# Patient Record
Sex: FEMALE | Race: WHITE | NOT HISPANIC OR LATINO | Employment: OTHER | URBAN - METROPOLITAN AREA
[De-identification: names, ages, dates, MRNs, and addresses within clinical notes are randomized per-mention and may not be internally consistent; named-entity substitution may affect disease eponyms.]

---

## 2017-02-20 ENCOUNTER — HISTORICAL (OUTPATIENT)
Dept: LAB | Facility: HOSPITAL | Age: 67
End: 2017-02-20

## 2017-07-10 ENCOUNTER — HISTORICAL (OUTPATIENT)
Dept: LAB | Facility: HOSPITAL | Age: 67
End: 2017-07-10

## 2017-07-10 LAB
ALBUMIN SERPL-MCNC: 3.6 GM/DL (ref 3.4–5)
ALBUMIN/GLOB SERPL: 1.1 RATIO (ref 1.1–2)
ALP SERPL-CCNC: 62 UNIT/L (ref 46–116)
ALT SERPL-CCNC: 23 UNIT/L (ref 12–78)
AST SERPL-CCNC: 18 UNIT/L (ref 15–37)
BILIRUB SERPL-MCNC: 0.4 MG/DL (ref 0.2–1)
BILIRUBIN DIRECT+TOT PNL SERPL-MCNC: 0.11 MG/DL (ref 0–0.2)
BILIRUBIN DIRECT+TOT PNL SERPL-MCNC: 0.29 MG/DL (ref 0–0.8)
BUN SERPL-MCNC: 25 MG/DL (ref 7–18)
CALCIUM SERPL-MCNC: 9.4 MG/DL (ref 8.5–10.1)
CHLORIDE SERPL-SCNC: 106 MMOL/L (ref 98–107)
CHOLEST SERPL-MCNC: 206 MG/DL (ref 0–200)
CHOLEST/HDLC SERPL: 4.5 {RATIO} (ref 0–4)
CO2 SERPL-SCNC: 30.3 MMOL/L (ref 21–32)
CREAT SERPL-MCNC: 1.11 MG/DL (ref 0.6–1.3)
DIGOXIN SERPL-MCNC: 0.7 NG/ML (ref 0.9–2)
GLOBULIN SER-MCNC: 3.3 GM/DL (ref 2.4–3.5)
GLUCOSE SERPL-MCNC: 135 MG/DL (ref 74–106)
HDLC SERPL-MCNC: 46 MG/DL (ref 40–60)
LDLC SERPL CALC-MCNC: 126 MG/DL (ref 0–129)
POTASSIUM SERPL-SCNC: 4.9 MMOL/L (ref 3.5–5.1)
PROT SERPL-MCNC: 6.9 GM/DL (ref 6.4–8.2)
SODIUM SERPL-SCNC: 144 MMOL/L (ref 136–145)
TRIGL SERPL-MCNC: 170 MG/DL
VLDLC SERPL CALC-MCNC: 34 MG/DL

## 2017-12-14 ENCOUNTER — HISTORICAL (OUTPATIENT)
Dept: LAB | Facility: HOSPITAL | Age: 67
End: 2017-12-14

## 2017-12-14 LAB
ALBUMIN SERPL-MCNC: 3.9 GM/DL (ref 3.4–5)
ALBUMIN/GLOB SERPL: 1.2 RATIO (ref 1.1–2)
ALP SERPL-CCNC: 72 UNIT/L (ref 46–116)
ALT SERPL-CCNC: 30 UNIT/L (ref 12–78)
AST SERPL-CCNC: 22 UNIT/L (ref 15–37)
BILIRUB SERPL-MCNC: 0.6 MG/DL (ref 0.2–1)
BILIRUBIN DIRECT+TOT PNL SERPL-MCNC: 0.12 MG/DL (ref 0–0.2)
BILIRUBIN DIRECT+TOT PNL SERPL-MCNC: 0.44 MG/DL (ref 0–0.8)
BUN SERPL-MCNC: 28.7 MG/DL (ref 7–18)
CALCIUM SERPL-MCNC: 10.4 MG/DL (ref 8.5–10.1)
CHLORIDE SERPL-SCNC: 102 MMOL/L (ref 98–107)
CHOLEST SERPL-MCNC: 276 MG/DL (ref 0–200)
CHOLEST/HDLC SERPL: 5.8 {RATIO} (ref 0–4)
CO2 SERPL-SCNC: 29 MMOL/L (ref 21–32)
CREAT SERPL-MCNC: 1.33 MG/DL (ref 0.6–1.3)
DIGOXIN SERPL-MCNC: 0.6 NG/ML (ref 0.9–2)
GLOBULIN SER-MCNC: 3.3 GM/DL (ref 2.4–3.5)
GLUCOSE SERPL-MCNC: 255 MG/DL (ref 74–106)
HDLC SERPL-MCNC: 48 MG/DL (ref 40–60)
LDLC SERPL CALC-MCNC: 152 MG/DL (ref 0–129)
POTASSIUM SERPL-SCNC: 3.8 MMOL/L (ref 3.5–5.1)
PROT SERPL-MCNC: 7.2 GM/DL (ref 6.4–8.2)
SODIUM SERPL-SCNC: 143 MMOL/L (ref 136–145)
TRIGL SERPL-MCNC: 382 MG/DL
VLDLC SERPL CALC-MCNC: 76 MG/DL

## 2018-01-10 ENCOUNTER — HISTORICAL (OUTPATIENT)
Dept: LAB | Facility: HOSPITAL | Age: 68
End: 2018-01-10

## 2018-01-10 ENCOUNTER — HISTORICAL (OUTPATIENT)
Dept: PREADMISSION TESTING | Facility: HOSPITAL | Age: 68
End: 2018-01-10

## 2018-01-10 LAB
ABS NEUT (OLG): 3.29 X10(3)/MCL (ref 2.1–9.2)
ALBUMIN SERPL-MCNC: 3.5 GM/DL (ref 3.4–5)
ALBUMIN/GLOB SERPL: 1.1 RATIO (ref 1.1–2)
ALP SERPL-CCNC: 67 UNIT/L (ref 38–126)
ALT SERPL-CCNC: 27 UNIT/L (ref 12–78)
APTT PPP: 32 SECOND(S) (ref 24.8–36.9)
AST SERPL-CCNC: 15 UNIT/L (ref 15–37)
BASOPHILS # BLD AUTO: 0 X10(3)/MCL (ref 0–0.2)
BASOPHILS NFR BLD AUTO: 1 %
BILIRUB SERPL-MCNC: 0.4 MG/DL (ref 0.2–1)
BILIRUBIN DIRECT+TOT PNL SERPL-MCNC: 0.1 MG/DL (ref 0–0.5)
BILIRUBIN DIRECT+TOT PNL SERPL-MCNC: 0.3 MG/DL (ref 0–0.8)
BUN SERPL-MCNC: 26 MG/DL (ref 7–18)
CALCIUM SERPL-MCNC: 10 MG/DL (ref 8.5–10.1)
CHLORIDE SERPL-SCNC: 104 MMOL/L (ref 98–107)
CO2 SERPL-SCNC: 30 MMOL/L (ref 21–32)
CREAT SERPL-MCNC: 1.38 MG/DL (ref 0.55–1.02)
EOSINOPHIL # BLD AUTO: 0.1 X10(3)/MCL (ref 0–0.9)
EOSINOPHIL NFR BLD AUTO: 2 %
ERYTHROCYTE [DISTWIDTH] IN BLOOD BY AUTOMATED COUNT: 12.7 % (ref 11.5–17)
GLOBULIN SER-MCNC: 3.2 GM/DL (ref 2.4–3.5)
GLUCOSE SERPL-MCNC: 215 MG/DL (ref 74–106)
HCT VFR BLD AUTO: 39.4 % (ref 37–47)
HGB BLD-MCNC: 13.2 GM/DL (ref 12–16)
INR PPP: 1.31 (ref 0–1.27)
LYMPHOCYTES # BLD AUTO: 1.9 X10(3)/MCL (ref 0.6–4.6)
LYMPHOCYTES NFR BLD AUTO: 32 %
MCH RBC QN AUTO: 29.4 PG (ref 27–31)
MCHC RBC AUTO-ENTMCNC: 33.5 GM/DL (ref 33–36)
MCV RBC AUTO: 87.8 FL (ref 80–94)
MONOCYTES # BLD AUTO: 0.5 X10(3)/MCL (ref 0.1–1.3)
MONOCYTES NFR BLD AUTO: 9 %
NEUTROPHILS # BLD AUTO: 3.29 X10(3)/MCL (ref 2.1–9.2)
NEUTROPHILS NFR BLD AUTO: 56 %
PLATELET # BLD AUTO: 241 X10(3)/MCL (ref 130–400)
PMV BLD AUTO: 11.6 FL (ref 9.4–12.4)
POTASSIUM SERPL-SCNC: 5.3 MMOL/L (ref 3.5–5.1)
PROT SERPL-MCNC: 6.7 GM/DL (ref 6.4–8.2)
PROTHROMBIN TIME: 16.7 SECOND(S) (ref 12.2–14.7)
RBC # BLD AUTO: 4.49 X10(6)/MCL (ref 4.2–5.4)
SODIUM SERPL-SCNC: 137 MMOL/L (ref 136–145)
WBC # SPEC AUTO: 5.9 X10(3)/MCL (ref 4.5–11.5)

## 2018-01-23 ENCOUNTER — HISTORICAL (OUTPATIENT)
Dept: LAB | Facility: HOSPITAL | Age: 68
End: 2018-01-23

## 2018-01-23 LAB
T3RU NFR SERPL: 34 % (ref 31–39)
T4 SERPL-MCNC: 6.7 MCG/DL (ref 4.7–13.3)
TSH SERPL-ACNC: 7.09 MIU/ML (ref 0.36–3.74)

## 2018-11-08 ENCOUNTER — HISTORICAL (OUTPATIENT)
Dept: RADIOLOGY | Facility: HOSPITAL | Age: 68
End: 2018-11-08

## 2018-12-17 ENCOUNTER — HISTORICAL (OUTPATIENT)
Dept: LAB | Facility: HOSPITAL | Age: 68
End: 2018-12-17

## 2018-12-17 LAB
ALBUMIN SERPL-MCNC: 3.7 GM/DL (ref 3.4–5)
ALBUMIN/GLOB SERPL: 1.1 RATIO (ref 1.1–2)
ALP SERPL-CCNC: 64 UNIT/L (ref 46–116)
ALT SERPL-CCNC: 23 UNIT/L (ref 12–78)
AST SERPL-CCNC: 15 UNIT/L (ref 15–37)
BILIRUB SERPL-MCNC: 0.5 MG/DL (ref 0.2–1)
BILIRUBIN DIRECT+TOT PNL SERPL-MCNC: 0.12 MG/DL (ref 0–0.2)
BILIRUBIN DIRECT+TOT PNL SERPL-MCNC: 0.38 MG/DL (ref 0–0.8)
BUN SERPL-MCNC: 20.1 MG/DL (ref 7–18)
CALCIUM SERPL-MCNC: 10 MG/DL (ref 8.5–10.1)
CHLORIDE SERPL-SCNC: 108 MMOL/L (ref 98–107)
CHOLEST SERPL-MCNC: 210 MG/DL (ref 0–200)
CHOLEST/HDLC SERPL: 5.1 {RATIO} (ref 0–4)
CO2 SERPL-SCNC: 30.9 MMOL/L (ref 21–32)
CREAT SERPL-MCNC: 0.91 MG/DL (ref 0.6–1.3)
GLOBULIN SER-MCNC: 3.3 GM/DL (ref 2.4–3.5)
GLUCOSE SERPL-MCNC: 178 MG/DL (ref 74–106)
HDLC SERPL-MCNC: 41 MG/DL (ref 40–60)
LDLC SERPL CALC-MCNC: 133 MG/DL (ref 0–129)
POTASSIUM SERPL-SCNC: 4.8 MMOL/L (ref 3.5–5.1)
PROT SERPL-MCNC: 7 GM/DL (ref 6.4–8.2)
SODIUM SERPL-SCNC: 146 MMOL/L (ref 136–145)
TRIGL SERPL-MCNC: 180 MG/DL
VLDLC SERPL CALC-MCNC: 36 MG/DL

## 2019-02-20 ENCOUNTER — HISTORICAL (OUTPATIENT)
Dept: LAB | Facility: HOSPITAL | Age: 69
End: 2019-02-20

## 2019-02-20 LAB
ALBUMIN SERPL-MCNC: 3.7 GM/DL (ref 3.4–5)
ALBUMIN/GLOB SERPL: 1.2 RATIO (ref 1.1–2)
ALP SERPL-CCNC: 62 UNIT/L (ref 46–116)
ALT SERPL-CCNC: 23 UNIT/L (ref 12–78)
AST SERPL-CCNC: 15 UNIT/L (ref 15–37)
BILIRUB SERPL-MCNC: 0.5 MG/DL (ref 0.2–1)
BILIRUBIN DIRECT+TOT PNL SERPL-MCNC: 0.13 MG/DL (ref 0–0.2)
BILIRUBIN DIRECT+TOT PNL SERPL-MCNC: 0.37 MG/DL (ref 0–0.8)
BUN SERPL-MCNC: 23.1 MG/DL (ref 7–18)
CALCIUM SERPL-MCNC: 10.4 MG/DL (ref 8.5–10.1)
CHLORIDE SERPL-SCNC: 103 MMOL/L (ref 98–107)
CHOLEST SERPL-MCNC: 237 MG/DL (ref 0–200)
CHOLEST/HDLC SERPL: 5.3 {RATIO} (ref 0–4)
CO2 SERPL-SCNC: 29 MMOL/L (ref 21–32)
CREAT SERPL-MCNC: 0.94 MG/DL (ref 0.6–1.3)
DIGOXIN SERPL-MCNC: 0.6 NG/ML (ref 0.9–2)
GLOBULIN SER-MCNC: 3 GM/DL (ref 2.4–3.5)
GLUCOSE SERPL-MCNC: 154 MG/DL (ref 74–106)
HDLC SERPL-MCNC: 45 MG/DL (ref 40–60)
LDLC SERPL CALC-MCNC: 152 MG/DL (ref 0–129)
POTASSIUM SERPL-SCNC: 4.1 MMOL/L (ref 3.5–5.1)
PROT SERPL-MCNC: 6.7 GM/DL (ref 6.4–8.2)
SODIUM SERPL-SCNC: 142 MMOL/L (ref 136–145)
TRIGL SERPL-MCNC: 199 MG/DL
VLDLC SERPL CALC-MCNC: 40 MG/DL

## 2019-12-02 ENCOUNTER — HISTORICAL (OUTPATIENT)
Dept: LAB | Facility: HOSPITAL | Age: 69
End: 2019-12-02

## 2019-12-02 LAB
ALBUMIN SERPL-MCNC: 4.5 GM/DL (ref 3.4–5)
ALBUMIN/GLOB SERPL: 1.6 RATIO (ref 1.1–2)
ALP SERPL-CCNC: 63 UNIT/L (ref 46–116)
ALT SERPL-CCNC: 23 UNIT/L (ref 12–78)
AST SERPL-CCNC: 20 UNIT/L (ref 15–37)
BILIRUB SERPL-MCNC: 0.8 MG/DL (ref 0.2–1)
BILIRUBIN DIRECT+TOT PNL SERPL-MCNC: 0.22 MG/DL (ref 0–0.2)
BILIRUBIN DIRECT+TOT PNL SERPL-MCNC: 0.58 MG/DL (ref 0–0.8)
BUN SERPL-MCNC: 23.7 MG/DL (ref 7–18)
CALCIUM SERPL-MCNC: 10.1 MG/DL (ref 8.5–10.1)
CHLORIDE SERPL-SCNC: 105 MMOL/L (ref 98–107)
CHOLEST SERPL-MCNC: 124 MG/DL (ref 0–200)
CHOLEST/HDLC SERPL: 2.3 {RATIO} (ref 0–4)
CO2 SERPL-SCNC: 29 MMOL/L (ref 21–32)
CREAT SERPL-MCNC: 1.27 MG/DL (ref 0.6–1.3)
GLOBULIN SER-MCNC: 2.9 GM/DL (ref 2.4–3.5)
GLUCOSE SERPL-MCNC: 164 MG/DL (ref 74–106)
HDLC SERPL-MCNC: 53 MG/DL (ref 40–60)
LDLC SERPL CALC-MCNC: 32 MG/DL (ref 0–129)
POTASSIUM SERPL-SCNC: 4 MMOL/L (ref 3.5–5.1)
PROT SERPL-MCNC: 7.4 GM/DL (ref 6.4–8.2)
SODIUM SERPL-SCNC: 146 MMOL/L (ref 136–145)
TRIGL SERPL-MCNC: 197 MG/DL
VLDLC SERPL CALC-MCNC: 39 MG/DL

## 2020-07-01 ENCOUNTER — HISTORICAL (OUTPATIENT)
Dept: LAB | Facility: HOSPITAL | Age: 70
End: 2020-07-01

## 2020-07-01 LAB
ALBUMIN SERPL-MCNC: 3.8 GM/DL (ref 3.4–5)
ALBUMIN/GLOB SERPL: 1.2 RATIO (ref 1.1–2)
ALP SERPL-CCNC: 40 UNIT/L (ref 46–116)
ALT SERPL-CCNC: 23 UNIT/L (ref 12–78)
AST SERPL-CCNC: 18 UNIT/L (ref 15–37)
BILIRUB SERPL-MCNC: 0.5 MG/DL (ref 0.2–1)
BILIRUBIN DIRECT+TOT PNL SERPL-MCNC: 0.22 MG/DL (ref 0–0.2)
BILIRUBIN DIRECT+TOT PNL SERPL-MCNC: 0.28 MG/DL (ref 0–0.8)
BUN SERPL-MCNC: 91.8 MG/DL (ref 7–18)
CALCIUM SERPL-MCNC: 10 MG/DL (ref 8.5–10.1)
CHLORIDE SERPL-SCNC: 104 MMOL/L (ref 98–107)
CHOLEST SERPL-MCNC: 134 MG/DL (ref 0–200)
CHOLEST/HDLC SERPL: 2 {RATIO} (ref 0–4)
CO2 SERPL-SCNC: 29.1 MMOL/L (ref 21–32)
CREAT SERPL-MCNC: 3.92 MG/DL (ref 0.6–1.3)
DIGOXIN SERPL-MCNC: <0.2 NG/ML (ref 0.9–2)
ERYTHROCYTE [DISTWIDTH] IN BLOOD BY AUTOMATED COUNT: 14.3 % (ref 11.5–17)
GLOBULIN SER-MCNC: 3.2 GM/DL (ref 2.4–3.5)
GLUCOSE SERPL-MCNC: 119 MG/DL (ref 74–106)
HCT VFR BLD AUTO: 32.6 % (ref 37–47)
HDLC SERPL-MCNC: 67 MG/DL (ref 40–60)
HGB BLD-MCNC: 10.8 GM/DL (ref 12–16)
LDLC SERPL CALC-MCNC: 56 MG/DL (ref 0–129)
MCH RBC QN AUTO: 29.3 PG (ref 27–31)
MCHC RBC AUTO-ENTMCNC: 33.1 GM/DL (ref 33–36)
MCV RBC AUTO: 88.6 FL (ref 80–94)
PLATELET # BLD AUTO: 220 X10(3)/MCL (ref 130–400)
PMV BLD AUTO: 10.6 FL (ref 9.4–12.4)
POTASSIUM SERPL-SCNC: 4.7 MMOL/L (ref 3.5–5.1)
PROT SERPL-MCNC: 7 GM/DL (ref 6.4–8.2)
RBC # BLD AUTO: 3.68 X10(6)/MCL (ref 4.2–5.4)
SODIUM SERPL-SCNC: 143 MMOL/L (ref 136–145)
TRIGL SERPL-MCNC: 55 MG/DL
VLDLC SERPL CALC-MCNC: 11 MG/DL
WBC # SPEC AUTO: 4.2 X10(3)/MCL (ref 4.5–11.5)

## 2020-07-22 ENCOUNTER — HISTORICAL (OUTPATIENT)
Dept: LAB | Facility: HOSPITAL | Age: 70
End: 2020-07-22

## 2020-07-22 LAB
BUN SERPL-MCNC: 65.3 MG/DL (ref 7–18)
CALCIUM SERPL-MCNC: 10.6 MG/DL (ref 8.5–10.1)
CHLORIDE SERPL-SCNC: 106 MMOL/L (ref 98–107)
CO2 SERPL-SCNC: 26.8 MMOL/L (ref 21–32)
CREAT SERPL-MCNC: 2.8 MG/DL (ref 0.6–1.3)
CREAT/UREA NIT SERPL: 23 MG/DL (ref 12–14)
GLUCOSE SERPL-MCNC: 139 MG/DL (ref 74–106)
POTASSIUM SERPL-SCNC: 4.7 MMOL/L (ref 3.5–5.1)
SODIUM SERPL-SCNC: 143 MMOL/L (ref 136–145)

## 2020-07-28 ENCOUNTER — HISTORICAL (OUTPATIENT)
Dept: LAB | Facility: HOSPITAL | Age: 70
End: 2020-07-28

## 2020-07-28 LAB
BUN SERPL-MCNC: 45.1 MG/DL (ref 7–18)
CALCIUM SERPL-MCNC: 10.2 MG/DL (ref 8.5–10.1)
CHLORIDE SERPL-SCNC: 105 MMOL/L (ref 98–107)
CO2 SERPL-SCNC: 25.6 MMOL/L (ref 21–32)
CREAT SERPL-MCNC: 1.95 MG/DL (ref 0.6–1.3)
CREAT/UREA NIT SERPL: 23 MG/DL (ref 12–14)
GLUCOSE SERPL-MCNC: 112 MG/DL (ref 74–106)
POTASSIUM SERPL-SCNC: 4.2 MMOL/L (ref 3.5–5.1)
SODIUM SERPL-SCNC: 144 MMOL/L (ref 136–145)

## 2020-08-04 ENCOUNTER — HISTORICAL (OUTPATIENT)
Dept: LAB | Facility: HOSPITAL | Age: 70
End: 2020-08-04

## 2020-08-04 LAB
ALBUMIN SERPL-MCNC: 3.9 GM/DL (ref 3.4–5)
ALBUMIN/GLOB SERPL: 1.2 RATIO (ref 1.1–2)
ALP SERPL-CCNC: 48 UNIT/L (ref 46–116)
ALT SERPL-CCNC: 30 UNIT/L (ref 12–78)
AST SERPL-CCNC: 25 UNIT/L (ref 15–37)
BILIRUB SERPL-MCNC: 0.4 MG/DL (ref 0.2–1)
BILIRUBIN DIRECT+TOT PNL SERPL-MCNC: 0.18 MG/DL (ref 0–0.2)
BILIRUBIN DIRECT+TOT PNL SERPL-MCNC: 0.22 MG/DL (ref 0–0.8)
BUN SERPL-MCNC: 32.6 MG/DL (ref 7–18)
CALCIUM SERPL-MCNC: 10.6 MG/DL (ref 8.5–10.1)
CHLORIDE SERPL-SCNC: 108 MMOL/L (ref 98–107)
CO2 SERPL-SCNC: 28.8 MMOL/L (ref 21–32)
CREAT SERPL-MCNC: 1.92 MG/DL (ref 0.6–1.3)
CREAT UR-MCNC: 39.3 MG/DL (ref 47–110)
DEPRECATED CALCIDIOL+CALCIFEROL SERPL-MC: 27.2 NG/ML (ref 6.6–49.9)
ERYTHROCYTE [DISTWIDTH] IN BLOOD BY AUTOMATED COUNT: 12.9 % (ref 11.5–17)
FERRITIN SERPL-MCNC: 86 NG/ML (ref 8–388)
GLOBULIN SER-MCNC: 3.2 GM/DL (ref 2.4–3.5)
GLUCOSE SERPL-MCNC: 89 MG/DL (ref 74–106)
HCT VFR BLD AUTO: 33.4 % (ref 37–47)
HGB BLD-MCNC: 11.2 GM/DL (ref 12–16)
IRON SATN MFR SERPL: 18.4 % (ref 20–50)
IRON SERPL-MCNC: 61 MCG/DL (ref 50–175)
MAGNESIUM SERPL-MCNC: 2.5 MG/DL (ref 1.8–2.4)
MCH RBC QN AUTO: 30.4 PG (ref 27–31)
MCHC RBC AUTO-ENTMCNC: 33.5 GM/DL (ref 33–36)
MCV RBC AUTO: 90.8 FL (ref 80–94)
PHOSPHATE SERPL-MCNC: 3.8 MG/DL (ref 2.5–4.9)
PLATELET # BLD AUTO: 210 X10(3)/MCL (ref 130–400)
PMV BLD AUTO: 10.3 FL (ref 9.4–12.4)
POTASSIUM SERPL-SCNC: 4.5 MMOL/L (ref 3.5–5.1)
PROT 24H UR-MCNC: <160 MG/24HR (ref 0–165)
PROT SERPL-MCNC: 7.1 GM/DL (ref 6.4–8.2)
PTH-INTACT SERPL-MCNC: 32.8 PG/ML (ref 8.7–77.1)
RBC # BLD AUTO: 3.68 X10(6)/MCL (ref 4.2–5.4)
SODIUM SERPL-SCNC: 144 MMOL/L (ref 136–145)
TIBC SERPL-MCNC: 331 MCG/DL (ref 250–450)
TRANSFERRIN SERPL-MCNC: 280 MG/DL (ref 200–360)
WBC # SPEC AUTO: 4.2 X10(3)/MCL (ref 4.5–11.5)

## 2020-11-06 ENCOUNTER — HISTORICAL (OUTPATIENT)
Dept: LAB | Facility: HOSPITAL | Age: 70
End: 2020-11-06

## 2020-11-06 LAB
ALBUMIN SERPL-MCNC: 3.7 GM/DL (ref 3.4–5)
ALBUMIN/GLOB SERPL: 1.3 RATIO (ref 1.1–2)
ALP SERPL-CCNC: 46 UNIT/L (ref 46–116)
ALT SERPL-CCNC: 22 UNIT/L (ref 12–78)
AST SERPL-CCNC: 22 UNIT/L (ref 15–37)
BILIRUB SERPL-MCNC: 0.4 MG/DL (ref 0.2–1)
BILIRUBIN DIRECT+TOT PNL SERPL-MCNC: 0.17 MG/DL (ref 0–0.2)
BILIRUBIN DIRECT+TOT PNL SERPL-MCNC: 0.23 MG/DL (ref 0–0.8)
BUN SERPL-MCNC: 49.2 MG/DL (ref 7–18)
CALCIUM SERPL-MCNC: 9.4 MG/DL (ref 8.5–10.1)
CHLORIDE SERPL-SCNC: 108 MMOL/L (ref 98–107)
CO2 SERPL-SCNC: 31.9 MMOL/L (ref 21–32)
CREAT SERPL-MCNC: 2.19 MG/DL (ref 0.6–1.3)
ERYTHROCYTE [DISTWIDTH] IN BLOOD BY AUTOMATED COUNT: 12.3 % (ref 11.5–17)
GLOBULIN SER-MCNC: 2.9 GM/DL (ref 2.4–3.5)
GLUCOSE SERPL-MCNC: 109 MG/DL (ref 74–106)
HCT VFR BLD AUTO: 36 % (ref 37–47)
HGB BLD-MCNC: 11.5 GM/DL (ref 12–16)
MAGNESIUM SERPL-MCNC: 2 MG/DL (ref 1.8–2.4)
MCH RBC QN AUTO: 30 PG (ref 27–31)
MCHC RBC AUTO-ENTMCNC: 31.9 GM/DL (ref 33–36)
MCV RBC AUTO: 94 FL (ref 80–94)
PHOSPHATE SERPL-MCNC: 3.6 MG/DL (ref 2.5–4.9)
PLATELET # BLD AUTO: 183 X10(3)/MCL (ref 130–400)
PMV BLD AUTO: 11.8 FL (ref 9.4–12.4)
POTASSIUM SERPL-SCNC: 4.4 MMOL/L (ref 3.5–5.1)
PROT SERPL-MCNC: 6.6 GM/DL (ref 6.4–8.2)
RBC # BLD AUTO: 3.83 X10(6)/MCL (ref 4.2–5.4)
SODIUM SERPL-SCNC: 147 MMOL/L (ref 136–145)
WBC # SPEC AUTO: 4 X10(3)/MCL (ref 4.5–11.5)

## 2020-11-17 ENCOUNTER — HISTORICAL (OUTPATIENT)
Dept: LAB | Facility: HOSPITAL | Age: 70
End: 2020-11-17

## 2020-11-17 LAB
ABS NEUT (OLG): 2.39 X10(3)/MCL (ref 2.1–9.2)
ALBUMIN SERPL-MCNC: 3.7 GM/DL (ref 3.4–4.8)
ALBUMIN/GLOB SERPL: 1.6 RATIO (ref 1.1–2)
ALP SERPL-CCNC: 49 UNIT/L (ref 40–150)
ALT SERPL-CCNC: 23 UNIT/L (ref 0–55)
AST SERPL-CCNC: 21 UNIT/L (ref 5–34)
BASOPHILS # BLD AUTO: 0 X10(3)/MCL (ref 0–0.2)
BASOPHILS NFR BLD AUTO: 1 %
BILIRUB SERPL-MCNC: 0.6 MG/DL
BILIRUBIN DIRECT+TOT PNL SERPL-MCNC: 0.3 MG/DL (ref 0–0.5)
BILIRUBIN DIRECT+TOT PNL SERPL-MCNC: 0.3 MG/DL (ref 0–0.8)
BUN SERPL-MCNC: 43.4 MG/DL (ref 9.8–20.1)
CALCIUM SERPL-MCNC: 9.2 MG/DL (ref 8.4–10.2)
CHLORIDE SERPL-SCNC: 104 MMOL/L (ref 98–107)
CO2 SERPL-SCNC: 28 MMOL/L (ref 23–31)
CREAT 24H UR-MCNC: 1575 MG/DAY (ref 710–1650)
CREAT SERPL-MCNC: 1.85 MG/DL (ref 0.55–1.02)
CREAT UR-MCNC: 50 MG/DL (ref 47–110)
DEPRECATED CALCIDIOL+CALCIFEROL SERPL-MC: 24.2 NG/ML (ref 30–80)
EOSINOPHIL # BLD AUTO: 0.1 X10(3)/MCL (ref 0–0.9)
EOSINOPHIL NFR BLD AUTO: 3 %
ERYTHROCYTE [DISTWIDTH] IN BLOOD BY AUTOMATED COUNT: 13.5 % (ref 11.5–17)
FERRITIN SERPL-MCNC: 66.5 NG/ML (ref 4.63–204)
GLOBULIN SER-MCNC: 2.3 GM/DL (ref 2.4–3.5)
GLUCOSE SERPL-MCNC: 246 MG/DL (ref 82–115)
HCT VFR BLD AUTO: 34.1 % (ref 37–47)
HGB BLD-MCNC: 10.5 GM/DL (ref 12–16)
IRON SATN MFR SERPL: 21 % (ref 20–50)
IRON SERPL-MCNC: 70 UG/DL (ref 50–170)
LYMPHOCYTES # BLD AUTO: 0.7 X10(3)/MCL (ref 0.6–4.6)
LYMPHOCYTES NFR BLD AUTO: 20 %
MAGNESIUM SERPL-MCNC: 2.15 MG/DL (ref 1.6–2.6)
MCH RBC QN AUTO: 29.3 PG (ref 27–31)
MCHC RBC AUTO-ENTMCNC: 30.8 GM/DL (ref 33–36)
MCV RBC AUTO: 95.3 FL (ref 80–94)
MONOCYTES # BLD AUTO: 0.3 X10(3)/MCL (ref 0.1–1.3)
MONOCYTES NFR BLD AUTO: 8 %
NEUTROPHILS # BLD AUTO: 2.39 X10(3)/MCL (ref 1.4–7.9)
NEUTROPHILS NFR BLD AUTO: 68 %
PHOSPHATE SERPL-MCNC: 3.4 MG/DL (ref 2.3–4.7)
PLATELET # BLD AUTO: 170 X10(3)/MCL (ref 130–400)
PMV BLD AUTO: 11.2 FL (ref 9.4–12.4)
POTASSIUM SERPL-SCNC: 4.4 MMOL/L (ref 3.5–5.1)
PROT 24H UR-MCNC: <214 MG/24HR (ref 0–165)
PROT SERPL-MCNC: 6 GM/DL (ref 5.8–7.6)
PTH-INTACT SERPL-MCNC: 133.1 PG/ML (ref 8.7–77.1)
RBC # BLD AUTO: 3.58 X10(6)/MCL (ref 4.2–5.4)
SODIUM SERPL-SCNC: 140 MMOL/L (ref 136–145)
TIBC SERPL-MCNC: 262 UG/DL (ref 70–310)
TIBC SERPL-MCNC: 332 UG/DL (ref 250–450)
TRANSFERRIN SERPL-MCNC: 288 MG/DL (ref 173–360)
WBC # SPEC AUTO: 3.5 X10(3)/MCL (ref 4.5–11.5)

## 2020-11-30 ENCOUNTER — HISTORICAL (OUTPATIENT)
Dept: LAB | Facility: HOSPITAL | Age: 70
End: 2020-11-30

## 2020-11-30 LAB
BUN SERPL-MCNC: 80.6 MG/DL (ref 9.8–20.1)
CALCIUM SERPL-MCNC: 10.1 MG/DL (ref 8.4–10.2)
CHLORIDE SERPL-SCNC: 98 MMOL/L (ref 98–107)
CO2 SERPL-SCNC: 30 MMOL/L (ref 23–31)
CREAT SERPL-MCNC: 2.75 MG/DL (ref 0.55–1.02)
CREAT/UREA NIT SERPL: 29
GLUCOSE SERPL-MCNC: 167 MG/DL (ref 82–115)
POTASSIUM SERPL-SCNC: 4.2 MMOL/L (ref 3.5–5.1)
SODIUM SERPL-SCNC: 142 MMOL/L (ref 136–145)

## 2020-12-22 ENCOUNTER — HISTORICAL (OUTPATIENT)
Dept: LAB | Facility: HOSPITAL | Age: 70
End: 2020-12-22

## 2020-12-22 LAB
ABS NEUT (OLG): 2.52 X10(3)/MCL (ref 2.1–9.2)
ALBUMIN SERPL-MCNC: 3.8 GM/DL (ref 3.4–4.8)
ALBUMIN/GLOB SERPL: 1.4 RATIO (ref 1.1–2)
ALP SERPL-CCNC: 39 UNIT/L (ref 40–150)
ALT SERPL-CCNC: 18 UNIT/L (ref 0–55)
AST SERPL-CCNC: 26 UNIT/L (ref 5–34)
BASOPHILS # BLD AUTO: 0 X10(3)/MCL (ref 0–0.2)
BASOPHILS NFR BLD AUTO: 1 %
BILIRUB SERPL-MCNC: 0.6 MG/DL
BILIRUBIN DIRECT+TOT PNL SERPL-MCNC: 0.3 MG/DL (ref 0–0.5)
BILIRUBIN DIRECT+TOT PNL SERPL-MCNC: 0.3 MG/DL (ref 0–0.8)
BUN SERPL-MCNC: 46.2 MG/DL (ref 9.8–20.1)
CALCIUM SERPL-MCNC: 10 MG/DL (ref 8.4–10.2)
CHLORIDE SERPL-SCNC: 100 MMOL/L (ref 98–107)
CO2 SERPL-SCNC: 37 MMOL/L (ref 23–31)
CREAT SERPL-MCNC: 2.14 MG/DL (ref 0.55–1.02)
DEPRECATED CALCIDIOL+CALCIFEROL SERPL-MC: 29.8 NG/ML (ref 30–80)
EOSINOPHIL # BLD AUTO: 0.1 X10(3)/MCL (ref 0–0.9)
EOSINOPHIL NFR BLD AUTO: 3 %
ERYTHROCYTE [DISTWIDTH] IN BLOOD BY AUTOMATED COUNT: 12.7 % (ref 11.5–17)
FERRITIN SERPL-MCNC: 93.3 NG/ML (ref 4.63–204)
GLOBULIN SER-MCNC: 2.7 GM/DL (ref 2.4–3.5)
GLUCOSE SERPL-MCNC: 72 MG/DL (ref 82–115)
HCT VFR BLD AUTO: 34.3 % (ref 37–47)
HGB BLD-MCNC: 11 GM/DL (ref 12–16)
IRON SATN MFR SERPL: 20 % (ref 20–50)
IRON SERPL-MCNC: 72 UG/DL (ref 50–170)
LYMPHOCYTES # BLD AUTO: 1 X10(3)/MCL (ref 0.6–4.6)
LYMPHOCYTES NFR BLD AUTO: 26 %
MAGNESIUM SERPL-MCNC: 2.03 MG/DL (ref 1.6–2.6)
MCH RBC QN AUTO: 29.7 PG (ref 27–31)
MCHC RBC AUTO-ENTMCNC: 32.1 GM/DL (ref 33–36)
MCV RBC AUTO: 92.7 FL (ref 80–94)
MONOCYTES # BLD AUTO: 0.4 X10(3)/MCL (ref 0.1–1.3)
MONOCYTES NFR BLD AUTO: 8 %
NEUTROPHILS # BLD AUTO: 2.52 X10(3)/MCL (ref 1.4–7.9)
NEUTROPHILS NFR BLD AUTO: 62 %
PHOSPHATE SERPL-MCNC: 3.6 MG/DL (ref 2.3–4.7)
PLATELET # BLD AUTO: 184 X10(3)/MCL (ref 130–400)
PMV BLD AUTO: 10.9 FL (ref 9.4–12.4)
POTASSIUM SERPL-SCNC: 3.8 MMOL/L (ref 3.5–5.1)
PROT SERPL-MCNC: 6.5 GM/DL (ref 5.8–7.6)
PTH-INTACT SERPL-MCNC: 48.9 PG/ML (ref 8.7–77.1)
RBC # BLD AUTO: 3.7 X10(6)/MCL (ref 4.2–5.4)
SODIUM SERPL-SCNC: 143 MMOL/L (ref 136–145)
TIBC SERPL-MCNC: 297 UG/DL (ref 70–310)
TIBC SERPL-MCNC: 369 UG/DL (ref 250–450)
TRANSFERRIN SERPL-MCNC: 317 MG/DL (ref 173–360)
WBC # SPEC AUTO: 4.1 X10(3)/MCL (ref 4.5–11.5)

## 2021-01-11 ENCOUNTER — HISTORICAL (OUTPATIENT)
Dept: LAB | Facility: HOSPITAL | Age: 71
End: 2021-01-11

## 2021-01-11 LAB
ABS NEUT (OLG): 2.89 X10(3)/MCL (ref 2.1–9.2)
ALBUMIN SERPL-MCNC: 3.9 GM/DL (ref 3.4–4.8)
ALBUMIN/GLOB SERPL: 1.3 RATIO (ref 1.1–2)
ALP SERPL-CCNC: 62 UNIT/L (ref 40–150)
ALT SERPL-CCNC: 27 UNIT/L (ref 0–55)
AST SERPL-CCNC: 37 UNIT/L (ref 5–34)
BASOPHILS # BLD AUTO: 0 X10(3)/MCL (ref 0–0.2)
BASOPHILS NFR BLD AUTO: 1 %
BILIRUB SERPL-MCNC: 0.7 MG/DL
BILIRUBIN DIRECT+TOT PNL SERPL-MCNC: 0.3 MG/DL (ref 0–0.5)
BILIRUBIN DIRECT+TOT PNL SERPL-MCNC: 0.4 MG/DL (ref 0–0.8)
BUN SERPL-MCNC: 52 MG/DL (ref 9.8–20.1)
CALCIUM SERPL-MCNC: 10.1 MG/DL (ref 8.4–10.2)
CHLORIDE SERPL-SCNC: 101 MMOL/L (ref 98–107)
CO2 SERPL-SCNC: 29 MMOL/L (ref 23–31)
CREAT SERPL-MCNC: 1.98 MG/DL (ref 0.55–1.02)
DEPRECATED CALCIDIOL+CALCIFEROL SERPL-MC: 38.5 NG/ML (ref 30–80)
EOSINOPHIL # BLD AUTO: 0.1 X10(3)/MCL (ref 0–0.9)
EOSINOPHIL NFR BLD AUTO: 2 %
ERYTHROCYTE [DISTWIDTH] IN BLOOD BY AUTOMATED COUNT: 13.1 % (ref 11.5–17)
FERRITIN SERPL-MCNC: 94.2 NG/ML (ref 4.63–204)
GLOBULIN SER-MCNC: 2.9 GM/DL (ref 2.4–3.5)
GLUCOSE SERPL-MCNC: 88 MG/DL (ref 82–115)
HCT VFR BLD AUTO: 35 % (ref 37–47)
HGB BLD-MCNC: 11.4 GM/DL (ref 12–16)
IMM GRANULOCYTES # BLD AUTO: 0.01 % (ref 0–0.02)
IMM GRANULOCYTES NFR BLD AUTO: 0.2 % (ref 0–0.43)
IRON SATN MFR SERPL: 21 % (ref 20–50)
IRON SERPL-MCNC: 80 UG/DL (ref 50–170)
LYMPHOCYTES # BLD AUTO: 1 X10(3)/MCL (ref 0.6–4.6)
LYMPHOCYTES NFR BLD AUTO: 23 %
MAGNESIUM SERPL-MCNC: 2.02 MG/DL (ref 1.6–2.6)
MCH RBC QN AUTO: 30 PG (ref 27–31)
MCHC RBC AUTO-ENTMCNC: 32.6 GM/DL (ref 33–36)
MCV RBC AUTO: 92.1 FL (ref 80–94)
MONOCYTES # BLD AUTO: 0.4 X10(3)/MCL (ref 0.1–1.3)
MONOCYTES NFR BLD AUTO: 8 %
NEUTROPHILS # BLD AUTO: 2.89 X10(3)/MCL (ref 1.4–7.9)
NEUTROPHILS NFR BLD AUTO: 65 %
PHOSPHATE SERPL-MCNC: 3.3 MG/DL (ref 2.3–4.7)
PLATELET # BLD AUTO: 228 X10(3)/MCL (ref 130–400)
PMV BLD AUTO: 11.1 FL (ref 9.4–12.4)
POTASSIUM SERPL-SCNC: 4.3 MMOL/L (ref 3.5–5.1)
PROT SERPL-MCNC: 6.8 GM/DL (ref 5.8–7.6)
PTH-INTACT SERPL-MCNC: 64 PG/ML (ref 8.7–77.1)
RBC # BLD AUTO: 3.8 X10(6)/MCL (ref 4.2–5.4)
SODIUM SERPL-SCNC: 143 MMOL/L (ref 136–145)
TIBC SERPL-MCNC: 299 UG/DL (ref 70–310)
TIBC SERPL-MCNC: 379 UG/DL (ref 250–450)
TRANSFERRIN SERPL-MCNC: 323 MG/DL (ref 173–360)
WBC # SPEC AUTO: 4.4 X10(3)/MCL (ref 4.5–11.5)

## 2021-03-05 ENCOUNTER — HISTORICAL (OUTPATIENT)
Dept: LAB | Facility: HOSPITAL | Age: 71
End: 2021-03-05

## 2021-03-05 LAB
ABS NEUT (OLG): 2.57 X10(3)/MCL (ref 2.1–9.2)
ALBUMIN SERPL-MCNC: 3.9 GM/DL (ref 3.4–4.8)
ALBUMIN/GLOB SERPL: 1.4 RATIO (ref 1.1–2)
ALP SERPL-CCNC: 41 UNIT/L (ref 40–150)
ALT SERPL-CCNC: 20 UNIT/L (ref 0–55)
AST SERPL-CCNC: 25 UNIT/L (ref 5–34)
BASOPHILS # BLD AUTO: 0 X10(3)/MCL (ref 0–0.2)
BASOPHILS NFR BLD AUTO: 1 %
BILIRUB SERPL-MCNC: 0.9 MG/DL
BILIRUBIN DIRECT+TOT PNL SERPL-MCNC: 0.4 MG/DL (ref 0–0.5)
BILIRUBIN DIRECT+TOT PNL SERPL-MCNC: 0.5 MG/DL (ref 0–0.8)
BUN SERPL-MCNC: 44.9 MG/DL (ref 9.8–20.1)
CALCIUM SERPL-MCNC: 10.3 MG/DL (ref 8.4–10.2)
CHLORIDE SERPL-SCNC: 103 MMOL/L (ref 98–107)
CHOLEST SERPL-MCNC: 115 MG/DL
CHOLEST/HDLC SERPL: 2 {RATIO} (ref 0–5)
CO2 SERPL-SCNC: 31 MMOL/L (ref 23–31)
CREAT SERPL-MCNC: 1.94 MG/DL (ref 0.55–1.02)
DEPRECATED CALCIDIOL+CALCIFEROL SERPL-MC: 41.6 NG/ML (ref 30–80)
EOSINOPHIL # BLD AUTO: 0.2 X10(3)/MCL (ref 0–0.9)
EOSINOPHIL NFR BLD AUTO: 4 %
ERYTHROCYTE [DISTWIDTH] IN BLOOD BY AUTOMATED COUNT: 13.2 % (ref 11.5–17)
FERRITIN SERPL-MCNC: 115.1 NG/ML (ref 4.63–204)
GLOBULIN SER-MCNC: 2.8 GM/DL (ref 2.4–3.5)
GLUCOSE SERPL-MCNC: 126 MG/DL (ref 82–115)
HCT VFR BLD AUTO: 35.9 % (ref 37–47)
HDLC SERPL-MCNC: 68 MG/DL (ref 35–60)
HGB BLD-MCNC: 11.4 GM/DL (ref 12–16)
IRON SATN MFR SERPL: 16 % (ref 20–50)
IRON SERPL-MCNC: 56 UG/DL (ref 50–170)
LDLC SERPL CALC-MCNC: 34 MG/DL (ref 50–140)
LYMPHOCYTES # BLD AUTO: 0.9 X10(3)/MCL (ref 0.6–4.6)
LYMPHOCYTES NFR BLD AUTO: 21 %
MAGNESIUM SERPL-MCNC: 2.1 MG/DL (ref 1.6–2.6)
MCH RBC QN AUTO: 29.4 PG (ref 27–31)
MCHC RBC AUTO-ENTMCNC: 31.8 GM/DL (ref 33–36)
MCV RBC AUTO: 92.5 FL (ref 80–94)
MONOCYTES # BLD AUTO: 0.4 X10(3)/MCL (ref 0.1–1.3)
MONOCYTES NFR BLD AUTO: 11 %
NEUTROPHILS # BLD AUTO: 2.57 X10(3)/MCL (ref 1.4–7.9)
NEUTROPHILS NFR BLD AUTO: 63 %
PHOSPHATE SERPL-MCNC: 3.7 MG/DL (ref 2.3–4.7)
PLATELET # BLD AUTO: 181 X10(3)/MCL (ref 130–400)
PMV BLD AUTO: 11.8 FL (ref 9.4–12.4)
POTASSIUM SERPL-SCNC: 4 MMOL/L (ref 3.5–5.1)
PROT SERPL-MCNC: 6.7 GM/DL (ref 5.8–7.6)
PTH-INTACT SERPL-MCNC: 47 PG/ML (ref 8.7–77.1)
RBC # BLD AUTO: 3.88 X10(6)/MCL (ref 4.2–5.4)
SODIUM SERPL-SCNC: 146 MMOL/L (ref 136–145)
TIBC SERPL-MCNC: 286 UG/DL (ref 70–310)
TIBC SERPL-MCNC: 342 UG/DL (ref 250–450)
TRANSFERRIN SERPL-MCNC: 303 MG/DL (ref 173–360)
TRIGL SERPL-MCNC: 67 MG/DL (ref 37–140)
VLDLC SERPL CALC-MCNC: 13 MG/DL
WBC # SPEC AUTO: 4.1 X10(3)/MCL (ref 4.5–11.5)

## 2021-04-07 ENCOUNTER — HISTORICAL (OUTPATIENT)
Dept: LAB | Facility: HOSPITAL | Age: 71
End: 2021-04-07

## 2021-04-07 LAB
BUN SERPL-MCNC: 64.3 MG/DL (ref 9.8–20.1)
CALCIUM SERPL-MCNC: 9.4 MG/DL (ref 8.4–10.2)
CHLORIDE SERPL-SCNC: 99 MMOL/L (ref 98–107)
CO2 SERPL-SCNC: 32 MMOL/L (ref 23–31)
CREAT SERPL-MCNC: 2.26 MG/DL (ref 0.55–1.02)
CREAT/UREA NIT SERPL: 28
GLUCOSE SERPL-MCNC: 82 MG/DL (ref 82–115)
POTASSIUM SERPL-SCNC: 4.1 MMOL/L (ref 3.5–5.1)
SODIUM SERPL-SCNC: 144 MMOL/L (ref 136–145)

## 2021-04-20 ENCOUNTER — HISTORICAL (OUTPATIENT)
Dept: LAB | Facility: HOSPITAL | Age: 71
End: 2021-04-20

## 2021-04-20 LAB
ABS NEUT (OLG): 2.62 X10(3)/MCL (ref 2.1–9.2)
ALBUMIN SERPL-MCNC: 3.7 GM/DL (ref 3.4–4.8)
ALBUMIN/GLOB SERPL: 1.3 RATIO (ref 1.1–2)
ALP SERPL-CCNC: 57 UNIT/L (ref 40–150)
ALT SERPL-CCNC: 21 UNIT/L (ref 0–55)
AST SERPL-CCNC: 24 UNIT/L (ref 5–34)
BASOPHILS # BLD AUTO: 0 X10(3)/MCL (ref 0–0.2)
BASOPHILS NFR BLD AUTO: 1 %
BILIRUB SERPL-MCNC: 0.5 MG/DL
BILIRUBIN DIRECT+TOT PNL SERPL-MCNC: 0.2 MG/DL (ref 0–0.8)
BILIRUBIN DIRECT+TOT PNL SERPL-MCNC: 0.3 MG/DL (ref 0–0.5)
BUN SERPL-MCNC: 50.7 MG/DL (ref 9.8–20.1)
CALCIUM SERPL-MCNC: 9.2 MG/DL (ref 8.4–10.2)
CHLORIDE SERPL-SCNC: 103 MMOL/L (ref 98–107)
CO2 SERPL-SCNC: 30 MMOL/L (ref 23–31)
CREAT SERPL-MCNC: 2.22 MG/DL (ref 0.55–1.02)
DEPRECATED CALCIDIOL+CALCIFEROL SERPL-MC: 44.8 NG/ML (ref 30–80)
EOSINOPHIL # BLD AUTO: 0.2 X10(3)/MCL (ref 0–0.9)
EOSINOPHIL NFR BLD AUTO: 6 %
ERYTHROCYTE [DISTWIDTH] IN BLOOD BY AUTOMATED COUNT: 13.6 % (ref 11.5–17)
GLOBULIN SER-MCNC: 2.9 GM/DL (ref 2.4–3.5)
GLUCOSE SERPL-MCNC: 151 MG/DL (ref 82–115)
HCT VFR BLD AUTO: 35.1 % (ref 37–47)
HGB BLD-MCNC: 11.3 GM/DL (ref 12–16)
IMM GRANULOCYTES # BLD AUTO: 0.01 % (ref 0–0.02)
IMM GRANULOCYTES NFR BLD AUTO: 0.2 % (ref 0–0.43)
LYMPHOCYTES # BLD AUTO: 1 X10(3)/MCL (ref 0.6–4.6)
LYMPHOCYTES NFR BLD AUTO: 22 %
MAGNESIUM SERPL-MCNC: 2.5 MG/DL (ref 1.6–2.6)
MCH RBC QN AUTO: 30.1 PG (ref 27–31)
MCHC RBC AUTO-ENTMCNC: 32.2 GM/DL (ref 33–36)
MCV RBC AUTO: 93.6 FL (ref 80–94)
MONOCYTES # BLD AUTO: 0.4 X10(3)/MCL (ref 0.1–1.3)
MONOCYTES NFR BLD AUTO: 9 %
NEUTROPHILS # BLD AUTO: 2.62 X10(3)/MCL (ref 1.4–7.9)
NEUTROPHILS NFR BLD AUTO: 62 %
PHOSPHATE SERPL-MCNC: 3.1 MG/DL (ref 2.3–4.7)
PLATELET # BLD AUTO: 178 X10(3)/MCL (ref 130–400)
PMV BLD AUTO: 10.9 FL (ref 9.4–12.4)
POTASSIUM SERPL-SCNC: 4.8 MMOL/L (ref 3.5–5.1)
PROT SERPL-MCNC: 6.6 GM/DL (ref 5.8–7.6)
PTH-INTACT SERPL-MCNC: 141.8 PG/ML (ref 8.7–77.1)
RBC # BLD AUTO: 3.75 X10(6)/MCL (ref 4.2–5.4)
SODIUM SERPL-SCNC: 142 MMOL/L (ref 136–145)
WBC # SPEC AUTO: 4.2 X10(3)/MCL (ref 4.5–11.5)

## 2021-05-17 ENCOUNTER — HISTORICAL (OUTPATIENT)
Dept: LAB | Facility: HOSPITAL | Age: 71
End: 2021-05-17

## 2021-05-17 LAB
ABS NEUT (OLG): 2.89 X10(3)/MCL (ref 2.1–9.2)
ALBUMIN SERPL-MCNC: 3.7 GM/DL (ref 3.4–4.8)
ALBUMIN/GLOB SERPL: 1.5 RATIO (ref 1.1–2)
ALP SERPL-CCNC: 44 UNIT/L (ref 40–150)
ALT SERPL-CCNC: 17 UNIT/L (ref 0–55)
AST SERPL-CCNC: 23 UNIT/L (ref 5–34)
BASOPHILS # BLD AUTO: 0 X10(3)/MCL (ref 0–0.2)
BASOPHILS NFR BLD AUTO: 1 %
BILIRUB SERPL-MCNC: 0.5 MG/DL
BILIRUBIN DIRECT+TOT PNL SERPL-MCNC: 0.2 MG/DL (ref 0–0.5)
BILIRUBIN DIRECT+TOT PNL SERPL-MCNC: 0.3 MG/DL (ref 0–0.8)
BUN SERPL-MCNC: 41.7 MG/DL (ref 9.8–20.1)
CALCIUM SERPL-MCNC: 9.7 MG/DL (ref 8.4–10.2)
CHLORIDE SERPL-SCNC: 108 MMOL/L (ref 98–107)
CO2 SERPL-SCNC: 30 MMOL/L (ref 23–31)
CREAT SERPL-MCNC: 1.84 MG/DL (ref 0.55–1.02)
DEPRECATED CALCIDIOL+CALCIFEROL SERPL-MC: 43.4 NG/ML (ref 30–80)
EOSINOPHIL # BLD AUTO: 0.2 X10(3)/MCL (ref 0–0.9)
EOSINOPHIL NFR BLD AUTO: 4 %
ERYTHROCYTE [DISTWIDTH] IN BLOOD BY AUTOMATED COUNT: 13.7 % (ref 11.5–17)
GLOBULIN SER-MCNC: 2.5 GM/DL (ref 2.4–3.5)
GLUCOSE SERPL-MCNC: 168 MG/DL (ref 82–115)
HCT VFR BLD AUTO: 35.5 % (ref 37–47)
HGB BLD-MCNC: 11.2 GM/DL (ref 12–16)
LYMPHOCYTES # BLD AUTO: 0.9 X10(3)/MCL (ref 0.6–4.6)
LYMPHOCYTES NFR BLD AUTO: 21 %
MAGNESIUM SERPL-MCNC: 2.1 MG/DL (ref 1.6–2.6)
MCH RBC QN AUTO: 29.5 PG (ref 27–31)
MCHC RBC AUTO-ENTMCNC: 31.5 GM/DL (ref 33–36)
MCV RBC AUTO: 93.4 FL (ref 80–94)
MONOCYTES # BLD AUTO: 0.4 X10(3)/MCL (ref 0.1–1.3)
MONOCYTES NFR BLD AUTO: 8 %
NEUTROPHILS # BLD AUTO: 2.89 X10(3)/MCL (ref 1.4–7.9)
NEUTROPHILS NFR BLD AUTO: 66 %
PHOSPHATE SERPL-MCNC: 3.1 MG/DL (ref 2.3–4.7)
PLATELET # BLD AUTO: 179 X10(3)/MCL (ref 130–400)
PMV BLD AUTO: 11.2 FL (ref 9.4–12.4)
POTASSIUM SERPL-SCNC: 4.2 MMOL/L (ref 3.5–5.1)
PROT SERPL-MCNC: 6.2 GM/DL (ref 5.8–7.6)
PTH-INTACT SERPL-MCNC: 53.2 PG/ML (ref 8.7–77.1)
RBC # BLD AUTO: 3.8 X10(6)/MCL (ref 4.2–5.4)
SODIUM SERPL-SCNC: 148 MMOL/L (ref 136–145)
WBC # SPEC AUTO: 4.4 X10(3)/MCL (ref 4.5–11.5)

## 2021-06-07 ENCOUNTER — HISTORICAL (OUTPATIENT)
Dept: LAB | Facility: HOSPITAL | Age: 71
End: 2021-06-07

## 2021-06-07 LAB
BUN SERPL-MCNC: 51.2 MG/DL (ref 9.8–20.1)
CALCIUM SERPL-MCNC: 9.6 MG/DL (ref 8.4–10.2)
CHLORIDE SERPL-SCNC: 103 MMOL/L (ref 98–107)
CO2 SERPL-SCNC: 32 MMOL/L (ref 23–31)
CREAT SERPL-MCNC: 2.47 MG/DL (ref 0.55–1.02)
CREAT/UREA NIT SERPL: 21
GLUCOSE SERPL-MCNC: 89 MG/DL (ref 82–115)
POTASSIUM SERPL-SCNC: 4.2 MMOL/L (ref 3.5–5.1)
SODIUM SERPL-SCNC: 149 MMOL/L (ref 136–145)

## 2021-06-14 ENCOUNTER — HISTORICAL (OUTPATIENT)
Dept: LAB | Facility: HOSPITAL | Age: 71
End: 2021-06-14

## 2021-06-14 LAB
BUN SERPL-MCNC: 51.6 MG/DL (ref 9.8–20.1)
CALCIUM SERPL-MCNC: 10.1 MG/DL (ref 8.4–10.2)
CHLORIDE SERPL-SCNC: 99 MMOL/L (ref 98–107)
CO2 SERPL-SCNC: 32 MMOL/L (ref 23–31)
CREAT SERPL-MCNC: 2.13 MG/DL (ref 0.55–1.02)
CREAT/UREA NIT SERPL: 24
GLUCOSE SERPL-MCNC: 92 MG/DL (ref 82–115)
POTASSIUM SERPL-SCNC: 3.8 MMOL/L (ref 3.5–5.1)
SODIUM SERPL-SCNC: 142 MMOL/L (ref 136–145)

## 2021-06-21 ENCOUNTER — HISTORICAL (OUTPATIENT)
Dept: LAB | Facility: HOSPITAL | Age: 71
End: 2021-06-21

## 2021-06-21 LAB
BUN SERPL-MCNC: 58.1 MG/DL (ref 9.8–20.1)
CALCIUM SERPL-MCNC: 9.7 MG/DL (ref 8.4–10.2)
CHLORIDE SERPL-SCNC: 102 MMOL/L (ref 98–107)
CO2 SERPL-SCNC: 31 MMOL/L (ref 23–31)
CREAT SERPL-MCNC: 1.83 MG/DL (ref 0.55–1.02)
CREAT/UREA NIT SERPL: 32
GLUCOSE SERPL-MCNC: 85 MG/DL (ref 82–115)
POTASSIUM SERPL-SCNC: 3.8 MMOL/L (ref 3.5–5.1)
SODIUM SERPL-SCNC: 143 MMOL/L (ref 136–145)

## 2021-07-16 ENCOUNTER — HISTORICAL (OUTPATIENT)
Dept: LAB | Facility: HOSPITAL | Age: 71
End: 2021-07-16

## 2021-07-16 LAB
BUN SERPL-MCNC: 53.9 MG/DL (ref 9.8–20.1)
CALCIUM SERPL-MCNC: 9.7 MG/DL (ref 8.4–10.2)
CHLORIDE SERPL-SCNC: 105 MMOL/L (ref 98–107)
CO2 SERPL-SCNC: 31 MMOL/L (ref 23–31)
CREAT SERPL-MCNC: 2.28 MG/DL (ref 0.55–1.02)
CREAT/UREA NIT SERPL: 24
GLUCOSE SERPL-MCNC: 122 MG/DL (ref 82–115)
POTASSIUM SERPL-SCNC: 4.3 MMOL/L (ref 3.5–5.1)
SODIUM SERPL-SCNC: 145 MMOL/L (ref 136–145)

## 2021-07-20 ENCOUNTER — HISTORICAL (OUTPATIENT)
Dept: LAB | Facility: HOSPITAL | Age: 71
End: 2021-07-20

## 2021-07-20 LAB
ALBUMIN SERPL-MCNC: 3.6 GM/DL (ref 3.4–4.8)
ALBUMIN/GLOB SERPL: 1.1 RATIO (ref 1.1–2)
ALP SERPL-CCNC: 43 UNIT/L (ref 40–150)
ALT SERPL-CCNC: 17 UNIT/L (ref 0–55)
AST SERPL-CCNC: 25 UNIT/L (ref 5–34)
BILIRUB SERPL-MCNC: 1 MG/DL
BILIRUBIN DIRECT+TOT PNL SERPL-MCNC: 0.3 MG/DL (ref 0–0.8)
BILIRUBIN DIRECT+TOT PNL SERPL-MCNC: 0.7 MG/DL (ref 0–0.5)
BUN SERPL-MCNC: 41.6 MG/DL (ref 9.8–20.1)
CALCIUM SERPL-MCNC: 10.2 MG/DL (ref 8.4–10.2)
CHLORIDE SERPL-SCNC: 103 MMOL/L (ref 98–107)
CHOLEST SERPL-MCNC: 88 MG/DL
CHOLEST/HDLC SERPL: 1 {RATIO} (ref 0–5)
CO2 SERPL-SCNC: 30 MMOL/L (ref 23–31)
CREAT SERPL-MCNC: 2.11 MG/DL (ref 0.55–1.02)
GLOBULIN SER-MCNC: 3.3 GM/DL (ref 2.4–3.5)
GLUCOSE SERPL-MCNC: 109 MG/DL (ref 82–115)
HDLC SERPL-MCNC: 60 MG/DL (ref 35–60)
LDLC SERPL CALC-MCNC: 19 MG/DL (ref 50–140)
POTASSIUM SERPL-SCNC: 3.4 MMOL/L (ref 3.5–5.1)
PROT SERPL-MCNC: 6.9 GM/DL (ref 5.8–7.6)
SODIUM SERPL-SCNC: 143 MMOL/L (ref 136–145)
T4 SERPL-MCNC: 13.5 UG/DL (ref 4.87–11.72)
TRIGL SERPL-MCNC: 43 MG/DL (ref 37–140)
TSH SERPL-ACNC: 1.11 UIU/ML (ref 0.35–4.94)
VLDLC SERPL CALC-MCNC: 9 MG/DL

## 2021-07-26 ENCOUNTER — HISTORICAL (OUTPATIENT)
Dept: LAB | Facility: HOSPITAL | Age: 71
End: 2021-07-26

## 2021-07-26 LAB
ABS NEUT (OLG): 3.61 X10(3)/MCL (ref 2.1–9.2)
ALBUMIN SERPL-MCNC: 3.5 GM/DL (ref 3.4–4.8)
ALBUMIN/GLOB SERPL: 1.1 RATIO (ref 1.1–2)
ALP SERPL-CCNC: 41 UNIT/L (ref 40–150)
ALT SERPL-CCNC: 16 UNIT/L (ref 0–55)
AST SERPL-CCNC: 25 UNIT/L (ref 5–34)
BASOPHILS # BLD AUTO: 0 X10(3)/MCL (ref 0–0.2)
BASOPHILS NFR BLD AUTO: 1 %
BILIRUB SERPL-MCNC: 0.9 MG/DL
BILIRUBIN DIRECT+TOT PNL SERPL-MCNC: 0.4 MG/DL (ref 0–0.8)
BILIRUBIN DIRECT+TOT PNL SERPL-MCNC: 0.5 MG/DL (ref 0–0.5)
BUN SERPL-MCNC: 44.9 MG/DL (ref 9.8–20.1)
CALCIUM SERPL-MCNC: 10.1 MG/DL (ref 8.4–10.2)
CHLORIDE SERPL-SCNC: 108 MMOL/L (ref 98–107)
CO2 SERPL-SCNC: 28 MMOL/L (ref 23–31)
CREAT SERPL-MCNC: 1.97 MG/DL (ref 0.55–1.02)
DEPRECATED CALCIDIOL+CALCIFEROL SERPL-MC: 54.9 NG/ML (ref 30–80)
EOSINOPHIL # BLD AUTO: 0.4 X10(3)/MCL (ref 0–0.9)
EOSINOPHIL NFR BLD AUTO: 8 %
ERYTHROCYTE [DISTWIDTH] IN BLOOD BY AUTOMATED COUNT: 12.6 % (ref 11.5–17)
FERRITIN SERPL-MCNC: 134.2 NG/ML (ref 4.63–204)
GLOBULIN SER-MCNC: 3.1 GM/DL (ref 2.4–3.5)
GLUCOSE SERPL-MCNC: 77 MG/DL (ref 82–115)
HCT VFR BLD AUTO: 33.3 % (ref 37–47)
HGB BLD-MCNC: 10.7 GM/DL (ref 12–16)
IMM GRANULOCYTES # BLD AUTO: 0.01 % (ref 0–0.02)
IMM GRANULOCYTES NFR BLD AUTO: 0.2 % (ref 0–0.43)
IRON SATN MFR SERPL: 16 % (ref 20–50)
IRON SERPL-MCNC: 51 UG/DL (ref 50–170)
LYMPHOCYTES # BLD AUTO: 0.9 X10(3)/MCL (ref 0.6–4.6)
LYMPHOCYTES NFR BLD AUTO: 16 %
MAGNESIUM SERPL-MCNC: 2.4 MG/DL (ref 1.6–2.6)
MCH RBC QN AUTO: 29.3 PG (ref 27–31)
MCHC RBC AUTO-ENTMCNC: 32.1 GM/DL (ref 33–36)
MCV RBC AUTO: 91.2 FL (ref 80–94)
MONOCYTES # BLD AUTO: 0.4 X10(3)/MCL (ref 0.1–1.3)
MONOCYTES NFR BLD AUTO: 8 %
NEUTROPHILS # BLD AUTO: 3.61 X10(3)/MCL (ref 1.4–7.9)
NEUTROPHILS NFR BLD AUTO: 67 %
PHOSPHATE SERPL-MCNC: 2.9 MG/DL (ref 2.3–4.7)
PLATELET # BLD AUTO: 219 X10(3)/MCL (ref 130–400)
PMV BLD AUTO: 10.5 FL (ref 9.4–12.4)
POTASSIUM SERPL-SCNC: 3.8 MMOL/L (ref 3.5–5.1)
PROT SERPL-MCNC: 6.6 GM/DL (ref 5.8–7.6)
PTH-INTACT SERPL-MCNC: 55.4 PG/ML (ref 8.7–77.1)
RBC # BLD AUTO: 3.65 X10(6)/MCL (ref 4.2–5.4)
SODIUM SERPL-SCNC: 146 MMOL/L (ref 136–145)
TIBC SERPL-MCNC: 277 UG/DL (ref 70–310)
TIBC SERPL-MCNC: 328 UG/DL (ref 250–450)
TRANSFERRIN SERPL-MCNC: 282 MG/DL (ref 173–360)
WBC # SPEC AUTO: 5.4 X10(3)/MCL (ref 4.5–11.5)

## 2021-09-10 ENCOUNTER — HISTORICAL (OUTPATIENT)
Dept: LAB | Facility: HOSPITAL | Age: 71
End: 2021-09-10

## 2021-09-10 LAB
ABS NEUT (OLG): 2.87 X10(3)/MCL (ref 2.1–9.2)
ALBUMIN SERPL-MCNC: 4 GM/DL (ref 3.4–4.8)
ALBUMIN/GLOB SERPL: 1.1 RATIO (ref 1.1–2)
ALP SERPL-CCNC: 61 UNIT/L (ref 40–150)
ALT SERPL-CCNC: 20 UNIT/L (ref 0–55)
AST SERPL-CCNC: 29 UNIT/L (ref 5–34)
BASOPHILS # BLD AUTO: 0 X10(3)/MCL (ref 0–0.2)
BASOPHILS NFR BLD AUTO: 1 %
BILIRUB SERPL-MCNC: 0.7 MG/DL
BILIRUBIN DIRECT+TOT PNL SERPL-MCNC: 0.3 MG/DL (ref 0–0.8)
BILIRUBIN DIRECT+TOT PNL SERPL-MCNC: 0.4 MG/DL (ref 0–0.5)
BUN SERPL-MCNC: 65.7 MG/DL (ref 9.8–20.1)
CALCIUM SERPL-MCNC: 10.2 MG/DL (ref 8.4–10.2)
CHLORIDE SERPL-SCNC: 104 MMOL/L (ref 98–107)
CO2 SERPL-SCNC: 31 MMOL/L (ref 23–31)
CREAT SERPL-MCNC: 2.23 MG/DL (ref 0.55–1.02)
DEPRECATED CALCIDIOL+CALCIFEROL SERPL-MC: 49.1 NG/ML (ref 30–80)
EOSINOPHIL # BLD AUTO: 0.1 X10(3)/MCL (ref 0–0.9)
EOSINOPHIL NFR BLD AUTO: 2 %
ERYTHROCYTE [DISTWIDTH] IN BLOOD BY AUTOMATED COUNT: 14.2 % (ref 11.5–17)
FERRITIN SERPL-MCNC: 949.9 NG/ML (ref 4.63–204)
GLOBULIN SER-MCNC: 3.5 GM/DL (ref 2.4–3.5)
GLUCOSE SERPL-MCNC: 79 MG/DL (ref 82–115)
HCT VFR BLD AUTO: 36 % (ref 37–47)
HGB BLD-MCNC: 11.8 GM/DL (ref 12–16)
IMM GRANULOCYTES # BLD AUTO: 0.01 % (ref 0–0.02)
IMM GRANULOCYTES NFR BLD AUTO: 0.2 % (ref 0–0.43)
IRON SATN MFR SERPL: 42 % (ref 20–50)
IRON SERPL-MCNC: 145 UG/DL (ref 50–170)
LYMPHOCYTES # BLD AUTO: 1.8 X10(3)/MCL (ref 0.6–4.6)
LYMPHOCYTES NFR BLD AUTO: 34 %
MAGNESIUM SERPL-MCNC: 2 MG/DL (ref 1.6–2.6)
MCH RBC QN AUTO: 30.3 PG (ref 27–31)
MCHC RBC AUTO-ENTMCNC: 32.8 GM/DL (ref 33–36)
MCV RBC AUTO: 92.3 FL (ref 80–94)
MONOCYTES # BLD AUTO: 0.4 X10(3)/MCL (ref 0.1–1.3)
MONOCYTES NFR BLD AUTO: 8 %
NEUTROPHILS # BLD AUTO: 2.87 X10(3)/MCL (ref 1.4–7.9)
NEUTROPHILS NFR BLD AUTO: 54 %
PHOSPHATE SERPL-MCNC: 3.8 MG/DL (ref 2.3–4.7)
PLATELET # BLD AUTO: 202 X10(3)/MCL (ref 130–400)
PMV BLD AUTO: 10.8 FL (ref 9.4–12.4)
POTASSIUM SERPL-SCNC: 4.5 MMOL/L (ref 3.5–5.1)
PROT SERPL-MCNC: 7.5 GM/DL (ref 5.8–7.6)
PTH-INTACT SERPL-MCNC: 66.6 PG/ML (ref 8.7–77.1)
RBC # BLD AUTO: 3.9 X10(6)/MCL (ref 4.2–5.4)
SODIUM SERPL-SCNC: 147 MMOL/L (ref 136–145)
TIBC SERPL-MCNC: 200 UG/DL (ref 70–310)
TIBC SERPL-MCNC: 345 UG/DL (ref 250–450)
TRANSFERRIN SERPL-MCNC: 286 MG/DL (ref 173–360)
WBC # SPEC AUTO: 5.3 X10(3)/MCL (ref 4.5–11.5)

## 2021-09-30 ENCOUNTER — HISTORICAL (OUTPATIENT)
Dept: LAB | Facility: HOSPITAL | Age: 71
End: 2021-09-30

## 2021-09-30 LAB
ABS NEUT (OLG): 3.46 X10(3)/MCL (ref 2.1–9.2)
ALBUMIN SERPL-MCNC: 3.7 GM/DL (ref 3.4–4.8)
ALBUMIN/GLOB SERPL: 1.2 RATIO (ref 1.1–2)
ALP SERPL-CCNC: 47 UNIT/L (ref 40–150)
ALT SERPL-CCNC: 17 UNIT/L (ref 0–55)
AST SERPL-CCNC: 24 UNIT/L (ref 5–34)
BASOPHILS # BLD AUTO: 0 X10(3)/MCL (ref 0–0.2)
BASOPHILS NFR BLD AUTO: 1 %
BILIRUB SERPL-MCNC: 0.7 MG/DL
BILIRUBIN DIRECT+TOT PNL SERPL-MCNC: 0.3 MG/DL (ref 0–0.8)
BILIRUBIN DIRECT+TOT PNL SERPL-MCNC: 0.4 MG/DL (ref 0–0.5)
BUN SERPL-MCNC: 37.5 MG/DL (ref 9.8–20.1)
CALCIUM SERPL-MCNC: 10.4 MG/DL (ref 8.4–10.2)
CHLORIDE SERPL-SCNC: 102 MMOL/L (ref 98–107)
CO2 SERPL-SCNC: 29 MMOL/L (ref 23–31)
CREAT SERPL-MCNC: 2.02 MG/DL (ref 0.55–1.02)
DEPRECATED CALCIDIOL+CALCIFEROL SERPL-MC: 41.4 NG/ML (ref 30–80)
EOSINOPHIL # BLD AUTO: 0.1 X10(3)/MCL (ref 0–0.9)
EOSINOPHIL NFR BLD AUTO: 2 %
ERYTHROCYTE [DISTWIDTH] IN BLOOD BY AUTOMATED COUNT: 14.3 % (ref 11.5–17)
FERRITIN SERPL-MCNC: 570.5 NG/ML (ref 4.63–204)
GLOBULIN SER-MCNC: 3.1 GM/DL (ref 2.4–3.5)
GLUCOSE SERPL-MCNC: 205 MG/DL (ref 82–115)
HCT VFR BLD AUTO: 34.8 % (ref 37–47)
HGB BLD-MCNC: 11.2 GM/DL (ref 12–16)
IMM GRANULOCYTES # BLD AUTO: 0.01 % (ref 0–0.02)
IMM GRANULOCYTES NFR BLD AUTO: 0.2 % (ref 0–0.43)
IRON SATN MFR SERPL: 20 % (ref 20–50)
IRON SERPL-MCNC: 57 UG/DL (ref 50–170)
LYMPHOCYTES # BLD AUTO: 1.1 X10(3)/MCL (ref 0.6–4.6)
LYMPHOCYTES NFR BLD AUTO: 21 %
MAGNESIUM SERPL-MCNC: 2.2 MG/DL (ref 1.6–2.6)
MCH RBC QN AUTO: 29.8 PG (ref 27–31)
MCHC RBC AUTO-ENTMCNC: 32.2 GM/DL (ref 33–36)
MCV RBC AUTO: 92.6 FL (ref 80–94)
MONOCYTES # BLD AUTO: 0.4 X10(3)/MCL (ref 0.1–1.3)
MONOCYTES NFR BLD AUTO: 8 %
NEUTROPHILS # BLD AUTO: 3.46 X10(3)/MCL (ref 1.4–7.9)
NEUTROPHILS NFR BLD AUTO: 67 %
PHOSPHATE SERPL-MCNC: 2.9 MG/DL (ref 2.3–4.7)
PLATELET # BLD AUTO: 148 X10(3)/MCL (ref 130–400)
PMV BLD AUTO: 11.2 FL (ref 9.4–12.4)
POTASSIUM SERPL-SCNC: 4.1 MMOL/L (ref 3.5–5.1)
PROT SERPL-MCNC: 6.8 GM/DL (ref 5.8–7.6)
PTH-INTACT SERPL-MCNC: 60.3 PG/ML (ref 8.7–77.1)
RBC # BLD AUTO: 3.76 X10(6)/MCL (ref 4.2–5.4)
SODIUM SERPL-SCNC: 143 MMOL/L (ref 136–145)
TIBC SERPL-MCNC: 230 UG/DL (ref 70–310)
TIBC SERPL-MCNC: 287 UG/DL (ref 250–450)
TRANSFERRIN SERPL-MCNC: 252 MG/DL (ref 173–360)
WBC # SPEC AUTO: 5.1 X10(3)/MCL (ref 4.5–11.5)

## 2021-10-26 ENCOUNTER — HISTORICAL (OUTPATIENT)
Dept: LAB | Facility: HOSPITAL | Age: 71
End: 2021-10-26

## 2021-10-26 LAB
ABS NEUT (OLG): 2.9 X10(3)/MCL (ref 2.1–9.2)
ALBUMIN SERPL-MCNC: 4 GM/DL (ref 3.4–4.8)
ALBUMIN/GLOB SERPL: 1.2 RATIO (ref 1.1–2)
ALP SERPL-CCNC: 55 UNIT/L (ref 40–150)
ALT SERPL-CCNC: 16 UNIT/L (ref 0–55)
AST SERPL-CCNC: 25 UNIT/L (ref 5–34)
BASOPHILS # BLD AUTO: 0 X10(3)/MCL (ref 0–0.2)
BASOPHILS NFR BLD AUTO: 0 %
BILIRUB SERPL-MCNC: 0.6 MG/DL
BILIRUBIN DIRECT+TOT PNL SERPL-MCNC: 0.3 MG/DL (ref 0–0.5)
BILIRUBIN DIRECT+TOT PNL SERPL-MCNC: 0.3 MG/DL (ref 0–0.8)
BUN SERPL-MCNC: 57.4 MG/DL (ref 9.8–20.1)
CALCIUM SERPL-MCNC: 10.9 MG/DL (ref 8.7–10.5)
CHLORIDE SERPL-SCNC: 100 MMOL/L (ref 98–107)
CO2 SERPL-SCNC: 28 MMOL/L (ref 23–31)
CREAT SERPL-MCNC: 2.43 MG/DL (ref 0.55–1.02)
DEPRECATED CALCIDIOL+CALCIFEROL SERPL-MC: 43.6 NG/ML (ref 30–80)
EOSINOPHIL # BLD AUTO: 0.2 X10(3)/MCL (ref 0–0.9)
EOSINOPHIL NFR BLD AUTO: 3 %
ERYTHROCYTE [DISTWIDTH] IN BLOOD BY AUTOMATED COUNT: 13.3 % (ref 11.5–17)
FERRITIN SERPL-MCNC: 1393 NG/ML (ref 4.63–204)
GLOBULIN SER-MCNC: 3.4 GM/DL (ref 2.4–3.5)
GLUCOSE SERPL-MCNC: 91 MG/DL (ref 82–115)
HCT VFR BLD AUTO: 38.5 % (ref 37–47)
HGB BLD-MCNC: 12.1 GM/DL (ref 12–16)
IMM GRANULOCYTES # BLD AUTO: 0.01 % (ref 0–0.02)
IMM GRANULOCYTES NFR BLD AUTO: 0.2 % (ref 0–0.43)
IRON SATN MFR SERPL: 44 % (ref 20–50)
IRON SERPL-MCNC: 141 UG/DL (ref 50–170)
LYMPHOCYTES # BLD AUTO: 1.4 X10(3)/MCL (ref 0.6–4.6)
LYMPHOCYTES NFR BLD AUTO: 27 %
MAGNESIUM SERPL-MCNC: 2.5 MG/DL (ref 1.6–2.6)
MCH RBC QN AUTO: 29.9 PG (ref 27–31)
MCHC RBC AUTO-ENTMCNC: 31.4 GM/DL (ref 33–36)
MCV RBC AUTO: 95.1 FL (ref 80–94)
MONOCYTES # BLD AUTO: 0.5 X10(3)/MCL (ref 0.1–1.3)
MONOCYTES NFR BLD AUTO: 11 %
NEUTROPHILS # BLD AUTO: 2.9 X10(3)/MCL (ref 1.4–7.9)
NEUTROPHILS NFR BLD AUTO: 58 %
PHOSPHATE SERPL-MCNC: 4 MG/DL (ref 2.3–4.7)
PLATELET # BLD AUTO: 168 X10(3)/MCL (ref 130–400)
PMV BLD AUTO: 11.6 FL (ref 9.4–12.4)
POTASSIUM SERPL-SCNC: 3.8 MMOL/L (ref 3.5–5.1)
PROT SERPL-MCNC: 7.4 GM/DL (ref 5.8–7.6)
PTH-INTACT SERPL-MCNC: 53.7 PG/ML (ref 8.7–77.1)
RBC # BLD AUTO: 4.05 X10(6)/MCL (ref 4.2–5.4)
SODIUM SERPL-SCNC: 142 MMOL/L (ref 136–145)
TIBC SERPL-MCNC: 180 UG/DL (ref 70–310)
TIBC SERPL-MCNC: 321 UG/DL (ref 250–450)
TRANSFERRIN SERPL-MCNC: 273 MG/DL (ref 173–360)
WBC # SPEC AUTO: 5 X10(3)/MCL (ref 4.5–11.5)

## 2021-11-09 ENCOUNTER — HISTORICAL (OUTPATIENT)
Dept: LAB | Facility: HOSPITAL | Age: 71
End: 2021-11-09

## 2021-11-09 LAB
BUN SERPL-MCNC: 43.4 MG/DL (ref 9.8–20.1)
CALCIUM SERPL-MCNC: 9.8 MG/DL (ref 8.7–10.5)
CHLORIDE SERPL-SCNC: 100 MMOL/L (ref 98–107)
CO2 SERPL-SCNC: 30 MMOL/L (ref 23–31)
CREAT SERPL-MCNC: 1.93 MG/DL (ref 0.55–1.02)
CREAT/UREA NIT SERPL: 22
GLUCOSE SERPL-MCNC: 176 MG/DL (ref 82–115)
POTASSIUM SERPL-SCNC: 3.7 MMOL/L (ref 3.5–5.1)
SODIUM SERPL-SCNC: 141 MMOL/L (ref 136–145)

## 2021-12-16 ENCOUNTER — HISTORICAL (OUTPATIENT)
Dept: WOUND CARE | Facility: HOSPITAL | Age: 71
End: 2021-12-16

## 2021-12-20 ENCOUNTER — HISTORICAL (OUTPATIENT)
Dept: SURGERY | Facility: HOSPITAL | Age: 71
End: 2021-12-20

## 2021-12-20 LAB
ABS NEUT (OLG): 3.46 X10(3)/MCL (ref 2.1–9.2)
ALBUMIN SERPL-MCNC: 3.5 GM/DL (ref 3.4–4.8)
ALBUMIN/GLOB SERPL: 1.2 RATIO (ref 1.1–2)
ALP SERPL-CCNC: 41 UNIT/L (ref 40–150)
ALT SERPL-CCNC: 15 UNIT/L (ref 0–55)
AST SERPL-CCNC: 22 UNIT/L (ref 5–34)
BASOPHILS # BLD AUTO: 0 X10(3)/MCL (ref 0–0.2)
BASOPHILS NFR BLD AUTO: 1 %
BILIRUB SERPL-MCNC: 0.6 MG/DL
BILIRUBIN DIRECT+TOT PNL SERPL-MCNC: 0.2 MG/DL (ref 0–0.8)
BILIRUBIN DIRECT+TOT PNL SERPL-MCNC: 0.4 MG/DL (ref 0–0.5)
BUN SERPL-MCNC: 45.2 MG/DL (ref 9.8–20.1)
CALCIUM SERPL-MCNC: 10.3 MG/DL (ref 8.7–10.5)
CHLORIDE SERPL-SCNC: 93 MMOL/L (ref 98–107)
CO2 SERPL-SCNC: 30 MMOL/L (ref 23–31)
CREAT SERPL-MCNC: 1.9 MG/DL (ref 0.55–1.02)
EOSINOPHIL # BLD AUTO: 0.2 X10(3)/MCL (ref 0–0.9)
EOSINOPHIL NFR BLD AUTO: 4 %
ERYTHROCYTE [DISTWIDTH] IN BLOOD BY AUTOMATED COUNT: 12.6 % (ref 11.5–17)
GLOBULIN SER-MCNC: 2.9 GM/DL (ref 2.4–3.5)
GLUCOSE SERPL-MCNC: 115 MG/DL (ref 82–115)
HCT VFR BLD AUTO: 33.5 % (ref 37–47)
HGB BLD-MCNC: 10.5 GM/DL (ref 12–16)
IMM GRANULOCYTES # BLD AUTO: 0.01 % (ref 0–0.02)
IMM GRANULOCYTES NFR BLD AUTO: 0.2 % (ref 0–0.43)
LYMPHOCYTES # BLD AUTO: 0.9 X10(3)/MCL (ref 0.6–4.6)
LYMPHOCYTES NFR BLD AUTO: 18 %
MCH RBC QN AUTO: 30.3 PG (ref 27–31)
MCHC RBC AUTO-ENTMCNC: 31.3 GM/DL (ref 33–36)
MCV RBC AUTO: 96.8 FL (ref 80–94)
MONOCYTES # BLD AUTO: 0.3 X10(3)/MCL (ref 0.1–1.3)
MONOCYTES NFR BLD AUTO: 6 %
NEUTROPHILS # BLD AUTO: 3.46 X10(3)/MCL (ref 1.4–7.9)
NEUTROPHILS NFR BLD AUTO: 71 %
PLATELET # BLD AUTO: 195 X10(3)/MCL (ref 130–400)
PMV BLD AUTO: 11.3 FL (ref 9.4–12.4)
POTASSIUM SERPL-SCNC: 3.6 MMOL/L (ref 3.5–5.1)
PROT SERPL-MCNC: 6.4 GM/DL (ref 5.8–7.6)
RBC # BLD AUTO: 3.46 X10(6)/MCL (ref 4.2–5.4)
SODIUM SERPL-SCNC: 128 MMOL/L (ref 136–145)
WBC # SPEC AUTO: 4.8 X10(3)/MCL (ref 4.5–11.5)

## 2021-12-21 ENCOUNTER — HISTORICAL (OUTPATIENT)
Dept: SURGERY | Facility: HOSPITAL | Age: 71
End: 2021-12-21

## 2022-01-12 ENCOUNTER — HISTORICAL (OUTPATIENT)
Dept: LAB | Facility: HOSPITAL | Age: 72
End: 2022-01-12

## 2022-01-12 LAB
ABS NEUT (OLG): 2.22 X10(3)/MCL (ref 2.1–9.2)
ALBUMIN SERPL-MCNC: 3.6 GM/DL (ref 3.4–4.8)
ALBUMIN/GLOB SERPL: 1.1 RATIO (ref 1.1–2)
ALP SERPL-CCNC: 37 UNIT/L (ref 40–150)
ALT SERPL-CCNC: 17 UNIT/L (ref 0–55)
AST SERPL-CCNC: 25 UNIT/L (ref 5–34)
BASOPHILS # BLD AUTO: 0 X10(3)/MCL (ref 0–0.2)
BASOPHILS NFR BLD AUTO: 1 %
BILIRUB SERPL-MCNC: 0.5 MG/DL
BILIRUBIN DIRECT+TOT PNL SERPL-MCNC: 0.2 MG/DL (ref 0–0.8)
BILIRUBIN DIRECT+TOT PNL SERPL-MCNC: 0.3 MG/DL (ref 0–0.5)
BUN SERPL-MCNC: 36.4 MG/DL (ref 9.8–20.1)
CALCIUM SERPL-MCNC: 9.5 MG/DL (ref 8.7–10.5)
CHLORIDE SERPL-SCNC: 102 MMOL/L (ref 98–107)
CO2 SERPL-SCNC: 31 MMOL/L (ref 23–31)
CREAT SERPL-MCNC: 1.86 MG/DL (ref 0.55–1.02)
DEPRECATED CALCIDIOL+CALCIFEROL SERPL-MC: 28.1 NG/ML (ref 30–80)
EOSINOPHIL # BLD AUTO: 0.1 X10(3)/MCL (ref 0–0.9)
EOSINOPHIL NFR BLD AUTO: 3 %
ERYTHROCYTE [DISTWIDTH] IN BLOOD BY AUTOMATED COUNT: 12.8 % (ref 11.5–17)
FERRITIN SERPL-MCNC: 722.9 NG/ML (ref 4.63–204)
GLOBULIN SER-MCNC: 3.4 GM/DL (ref 2.4–3.5)
GLUCOSE SERPL-MCNC: 163 MG/DL (ref 82–115)
HCT VFR BLD AUTO: 34.6 % (ref 37–47)
HGB BLD-MCNC: 11 GM/DL (ref 12–16)
IRON SATN MFR SERPL: 26 % (ref 20–50)
IRON SERPL-MCNC: 71 UG/DL (ref 50–170)
LYMPHOCYTES # BLD AUTO: 0.5 X10(3)/MCL (ref 0.6–4.6)
LYMPHOCYTES NFR BLD AUTO: 16 %
MAGNESIUM SERPL-MCNC: 2.1 MG/DL (ref 1.6–2.6)
MCH RBC QN AUTO: 30.5 PG (ref 27–31)
MCHC RBC AUTO-ENTMCNC: 31.8 GM/DL (ref 33–36)
MCV RBC AUTO: 95.8 FL (ref 80–94)
MONOCYTES # BLD AUTO: 0.3 X10(3)/MCL (ref 0.1–1.3)
MONOCYTES NFR BLD AUTO: 9 %
NEUTROPHILS # BLD AUTO: 2.22 X10(3)/MCL (ref 1.4–7.9)
NEUTROPHILS NFR BLD AUTO: 72 %
PHOSPHATE SERPL-MCNC: 2.7 MG/DL (ref 2.3–4.7)
PLATELET # BLD AUTO: 181 X10(3)/MCL (ref 130–400)
PMV BLD AUTO: 9.6 FL (ref 9.4–12.4)
POTASSIUM SERPL-SCNC: 3.3 MMOL/L (ref 3.5–5.1)
PROT SERPL-MCNC: 7 GM/DL (ref 5.8–7.6)
PTH-INTACT SERPL-MCNC: 170.8 PG/ML (ref 8.7–77.1)
RBC # BLD AUTO: 3.61 X10(6)/MCL (ref 4.2–5.4)
SODIUM SERPL-SCNC: 141 MMOL/L (ref 136–145)
TIBC SERPL-MCNC: 207 UG/DL (ref 70–310)
TIBC SERPL-MCNC: 278 UG/DL (ref 250–450)
TRANSFERRIN SERPL-MCNC: 248 MG/DL (ref 173–360)
WBC # SPEC AUTO: 3.1 X10(3)/MCL (ref 4.5–11.5)

## 2022-02-16 ENCOUNTER — HISTORICAL (OUTPATIENT)
Dept: LAB | Facility: HOSPITAL | Age: 72
End: 2022-02-16

## 2022-02-16 LAB
ALBUMIN SERPL-MCNC: 3.5 G/DL (ref 3.4–4.8)
ALBUMIN/GLOB SERPL: 1 {RATIO} (ref 1.1–2)
ALP SERPL-CCNC: 45 U/L (ref 40–150)
ALT SERPL-CCNC: 14 U/L (ref 0–55)
AST SERPL-CCNC: 26 U/L (ref 5–34)
BILIRUB SERPL-MCNC: 0.7 MG/DL
BILIRUBIN DIRECT+TOT PNL SERPL-MCNC: 0.3 (ref 0–0.8)
BILIRUBIN DIRECT+TOT PNL SERPL-MCNC: 0.4 (ref 0–0.5)
BUN SERPL-MCNC: 73.9 MG/DL (ref 9.8–20.1)
CALCIUM SERPL-MCNC: 10.7 MG/DL (ref 8.7–10.5)
CHLORIDE SERPL-SCNC: 97 MMOL/L (ref 98–107)
CHOLEST SERPL-MCNC: 167 MG/DL
CHOLEST/HDLC SERPL: 3 {RATIO} (ref 0–5)
CO2 SERPL-SCNC: 30 MMOL/L (ref 23–31)
CREAT SERPL-MCNC: 2.87 MG/DL (ref 0.55–1.02)
GLOBULIN SER-MCNC: 3.6 G/DL (ref 2.4–3.5)
GLUCOSE SERPL-MCNC: 83 MG/DL (ref 82–115)
HDLC SERPL-MCNC: 48 MG/DL (ref 35–60)
HEMOLYSIS INTERF INDEX SERPL-ACNC: 21
ICTERIC INTERF INDEX SERPL-ACNC: 0
LDLC SERPL CALC-MCNC: 103 MG/DL (ref 50–140)
LIPEMIC INTERF INDEX SERPL-ACNC: <0
POTASSIUM SERPL-SCNC: 3.7 MMOL/L (ref 3.5–5.1)
PROT SERPL-MCNC: 7.1 G/DL (ref 5.8–7.6)
SODIUM SERPL-SCNC: 143 MMOL/L (ref 136–145)
T4 SERPL-MCNC: 13.72 UG/DL (ref 4.87–11.72)
TRIGL SERPL-MCNC: 79 MG/DL (ref 37–140)
TSH SERPL-ACNC: 1.9 M[IU]/L (ref 0.35–4.94)
VLDLC SERPL CALC-MCNC: 16 MG/DL

## 2022-04-05 ENCOUNTER — HISTORICAL (OUTPATIENT)
Dept: LAB | Facility: HOSPITAL | Age: 72
End: 2022-04-05

## 2022-04-05 LAB
ABS NEUT (OLG): 2.52 (ref 2.1–9.2)
ALBUMIN SERPL-MCNC: 3.7 G/DL (ref 3.4–4.8)
ALBUMIN/GLOB SERPL: 1.1 {RATIO} (ref 1.1–2)
ALP SERPL-CCNC: 43 U/L (ref 40–150)
ALT SERPL-CCNC: 19 U/L (ref 0–55)
AST SERPL-CCNC: 25 U/L (ref 5–34)
BASOPHILS # BLD AUTO: 0 10*3/UL (ref 0–0.2)
BASOPHILS NFR BLD AUTO: 0 %
BILIRUB SERPL-MCNC: 0.7 MG/DL
BILIRUBIN DIRECT+TOT PNL SERPL-MCNC: 0.2 (ref 0–0.8)
BILIRUBIN DIRECT+TOT PNL SERPL-MCNC: 0.5 (ref 0–0.5)
BUN SERPL-MCNC: 44.3 MG/DL (ref 9.8–20.1)
CALCIUM SERPL-MCNC: 10.4 MG/DL (ref 8.7–10.5)
CHLORIDE SERPL-SCNC: 100 MMOL/L (ref 98–107)
CO2 SERPL-SCNC: 31 MMOL/L (ref 23–31)
CREAT SERPL-MCNC: 2.28 MG/DL (ref 0.55–1.02)
DEPRECATED CALCIDIOL+CALCIFEROL SERPL-MC: 33.2 NG/ML (ref 30–80)
EOSINOPHIL # BLD AUTO: 0.1 10*3/UL (ref 0–0.9)
EOSINOPHIL NFR BLD AUTO: 3 %
ERYTHROCYTE [DISTWIDTH] IN BLOOD BY AUTOMATED COUNT: 12.7 % (ref 11.5–17)
FERRITIN SERPL-MCNC: 597.6 NG/ML (ref 4.63–204)
GLOBULIN SER-MCNC: 3.3 G/DL (ref 2.4–3.5)
GLUCOSE SERPL-MCNC: 104 MG/DL (ref 82–115)
HCT VFR BLD AUTO: 32.9 % (ref 37–47)
HEMOLYSIS INTERF INDEX SERPL-ACNC: 11
HEMOLYSIS INTERF INDEX SERPL-ACNC: 11
HGB BLD-MCNC: 10.6 G/DL (ref 12–16)
ICTERIC INTERF INDEX SERPL-ACNC: 1
IRON SATN MFR SERPL: 24 % (ref 20–50)
IRON SERPL-MCNC: 69 UG/DL (ref 50–170)
LIPEMIC INTERF INDEX SERPL-ACNC: 3
LYMPHOCYTES # BLD AUTO: 0.9 10*3/UL (ref 0.6–4.6)
LYMPHOCYTES NFR BLD AUTO: 23 %
MAGNESIUM SERPL-MCNC: 2.1 MG/DL (ref 1.6–2.6)
MANUAL DIFF? (OHS): NO
MCH RBC QN AUTO: 30.3 PG (ref 27–31)
MCHC RBC AUTO-ENTMCNC: 32.2 G/DL (ref 33–36)
MCV RBC AUTO: 94 FL (ref 80–94)
MONOCYTES # BLD AUTO: 0.4 10*3/UL (ref 0.1–1.3)
MONOCYTES NFR BLD AUTO: 9 %
NEUTROPHILS # BLD AUTO: 2.52 10*3/UL (ref 1.4–7.9)
NEUTROPHILS NFR BLD AUTO: 65 %
PHOSPHATE SERPL-MCNC: 2.9 MG/DL (ref 2.3–4.7)
PLATELET # BLD AUTO: 176 10*3/UL (ref 130–400)
PMV BLD AUTO: 11 FL (ref 9.4–12.4)
POTASSIUM SERPL-SCNC: 3.8 MMOL/L (ref 3.5–5.1)
PROT SERPL-MCNC: 7 G/DL (ref 5.8–7.6)
PTH-INTACT SERPL-MCNC: 76.7 PG/ML (ref 8.7–77.1)
RBC # BLD AUTO: 3.5 10*6/UL (ref 4.2–5.4)
SODIUM SERPL-SCNC: 141 MMOL/L (ref 136–145)
TIBC SERPL-MCNC: 217 UG/DL (ref 70–310)
TIBC SERPL-MCNC: 286 UG/DL (ref 250–450)
TRANSFERRIN SERPL-MCNC: 253 MG/DL (ref 173–360)
WBC # SPEC AUTO: 3.9 10*3/UL (ref 4.5–11.5)

## 2022-04-11 ENCOUNTER — HISTORICAL (OUTPATIENT)
Dept: ADMINISTRATIVE | Facility: HOSPITAL | Age: 72
End: 2022-04-11

## 2022-04-28 ENCOUNTER — HISTORICAL (OUTPATIENT)
Dept: LAB | Facility: HOSPITAL | Age: 72
End: 2022-04-28
Payer: OTHER GOVERNMENT

## 2022-04-28 LAB
ALBUMIN SERPL-MCNC: 3.5 G/DL (ref 3.4–4.8)
ALBUMIN/GLOB SERPL: 1 {RATIO} (ref 1.1–2)
ALP SERPL-CCNC: 68 U/L (ref 40–150)
ALT SERPL-CCNC: 14 U/L (ref 0–55)
AST SERPL-CCNC: 24 U/L (ref 5–34)
BILIRUB SERPL-MCNC: 0.4 MG/DL
BILIRUBIN DIRECT+TOT PNL SERPL-MCNC: 0.2 (ref 0–0.5)
BILIRUBIN DIRECT+TOT PNL SERPL-MCNC: 0.2 (ref 0–0.8)
BUN SERPL-MCNC: 53 MG/DL (ref 9.8–20.1)
CALCIUM SERPL-MCNC: 10.2 MG/DL (ref 8.7–10.5)
CHLORIDE SERPL-SCNC: 102 MMOL/L (ref 98–107)
CO2 SERPL-SCNC: 28 MMOL/L (ref 23–31)
CREAT SERPL-MCNC: 2.93 MG/DL (ref 0.55–1.02)
GLOBULIN SER-MCNC: 3.4 G/DL (ref 2.4–3.5)
GLUCOSE SERPL-MCNC: 110 MG/DL (ref 82–115)
HEMOLYSIS INTERF INDEX SERPL-ACNC: 3
ICTERIC INTERF INDEX SERPL-ACNC: 0
LIPEMIC INTERF INDEX SERPL-ACNC: 9
POTASSIUM SERPL-SCNC: 4.1 MMOL/L (ref 3.5–5.1)
PROT SERPL-MCNC: 6.9 G/DL (ref 5.8–7.6)
SODIUM SERPL-SCNC: 142 MMOL/L (ref 136–145)
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
## 2022-05-12 ENCOUNTER — LAB VISIT (OUTPATIENT)
Dept: LAB | Facility: HOSPITAL | Age: 72
End: 2022-05-12
Attending: INTERNAL MEDICINE
Payer: MEDICARE

## 2022-05-12 DIAGNOSIS — E21.3 HYPERPARATHYROIDISM, UNSPECIFIED: ICD-10-CM

## 2022-05-12 DIAGNOSIS — N18.4 CHRONIC KIDNEY DISEASE, STAGE IV (SEVERE): Primary | ICD-10-CM

## 2022-05-12 DIAGNOSIS — N18.9 ANEMIA OF CHRONIC RENAL FAILURE, UNSPECIFIED CKD STAGE: ICD-10-CM

## 2022-05-12 DIAGNOSIS — E55.9 AVITAMINOSIS D: ICD-10-CM

## 2022-05-12 DIAGNOSIS — N18.6 TYPE 2 DIABETES MELLITUS WITH END-STAGE RENAL DISEASE: ICD-10-CM

## 2022-05-12 DIAGNOSIS — E87.5 HYPERPOTASSEMIA: ICD-10-CM

## 2022-05-12 DIAGNOSIS — D63.1 ANEMIA OF CHRONIC RENAL FAILURE, UNSPECIFIED CKD STAGE: ICD-10-CM

## 2022-05-12 DIAGNOSIS — I12.9 PARENCHYMAL RENAL HYPERTENSION: ICD-10-CM

## 2022-05-12 DIAGNOSIS — E11.22 TYPE 2 DIABETES MELLITUS WITH END-STAGE RENAL DISEASE: ICD-10-CM

## 2022-05-12 LAB
ANION GAP SERPL CALC-SCNC: 8 MEQ/L
BUN SERPL-MCNC: 43.4 MG/DL (ref 9.8–20.1)
CALCIUM SERPL-MCNC: 9.6 MG/DL (ref 8.4–10.2)
CHLORIDE SERPL-SCNC: 104 MMOL/L (ref 98–107)
CO2 SERPL-SCNC: 32 MMOL/L (ref 23–31)
CREAT SERPL-MCNC: 2.49 MG/DL (ref 0.55–1.02)
CREAT/UREA NIT SERPL: 17
GLUCOSE SERPL-MCNC: 148 MG/DL (ref 82–115)
POTASSIUM SERPL-SCNC: 4.3 MMOL/L (ref 3.5–5.1)
SODIUM SERPL-SCNC: 144 MMOL/L (ref 136–145)

## 2022-05-12 PROCEDURE — 36415 COLL VENOUS BLD VENIPUNCTURE: CPT

## 2022-05-12 PROCEDURE — 80048 BASIC METABOLIC PNL TOTAL CA: CPT

## 2022-05-24 ENCOUNTER — HOSPITAL ENCOUNTER (OUTPATIENT)
Dept: WOUND CARE | Facility: HOSPITAL | Age: 72
Discharge: HOME | End: 2022-05-24
Attending: PEDIATRICS
Payer: MEDICARE

## 2022-05-24 VITALS
HEART RATE: 60 BPM | SYSTOLIC BLOOD PRESSURE: 145 MMHG | DIASTOLIC BLOOD PRESSURE: 72 MMHG | TEMPERATURE: 99 F | OXYGEN SATURATION: 97 % | RESPIRATION RATE: 18 BRPM

## 2022-05-24 DIAGNOSIS — M61.40 POSTOPERATIVE HETEROTOPIC CALCIFICATION: Primary | ICD-10-CM

## 2022-05-24 DIAGNOSIS — L97.912 NONHEALING ULCER OF RIGHT LOWER LEG WITH FAT LAYER EXPOSED: ICD-10-CM

## 2022-05-24 DIAGNOSIS — M96.89 POSTOPERATIVE HETEROTOPIC CALCIFICATION: Primary | ICD-10-CM

## 2022-05-24 PROCEDURE — 99213 OFFICE O/P EST LOW 20 MIN: CPT | Mod: ,,, | Performed by: PEDIATRICS

## 2022-05-24 PROCEDURE — 99213 PR OFFICE/OUTPT VISIT, EST, LEVL III, 20-29 MIN: ICD-10-PCS | Mod: ,,, | Performed by: PEDIATRICS

## 2022-05-24 PROCEDURE — 99213 OFFICE O/P EST LOW 20 MIN: CPT

## 2022-05-24 RX ORDER — CLOPIDOGREL BISULFATE 75 MG/1
75 TABLET ORAL
COMMUNITY
Start: 2022-05-03 | End: 2023-05-25

## 2022-05-24 RX ORDER — GLIPIZIDE 5 MG/1
5 TABLET ORAL
COMMUNITY
End: 2023-05-25 | Stop reason: ALTCHOICE

## 2022-05-24 RX ORDER — LEVOTHYROXINE SODIUM 112 UG/1
TABLET ORAL
COMMUNITY
Start: 2022-05-09 | End: 2023-05-25 | Stop reason: ALTCHOICE

## 2022-05-24 RX ORDER — FENOFIBRATE 200 MG/1
200 CAPSULE ORAL
COMMUNITY

## 2022-05-24 RX ORDER — HYDRALAZINE HYDROCHLORIDE 25 MG/1
25 TABLET, FILM COATED ORAL 2 TIMES DAILY
COMMUNITY
Start: 2022-05-10

## 2022-05-24 RX ORDER — AMIODARONE HYDROCHLORIDE 200 MG/1
TABLET ORAL
COMMUNITY
Start: 2022-04-11 | End: 2023-05-25 | Stop reason: ALTCHOICE

## 2022-05-24 RX ORDER — METOLAZONE 2.5 MG/1
2.5 TABLET ORAL
COMMUNITY
Start: 2021-06-08 | End: 2023-05-25 | Stop reason: ALTCHOICE

## 2022-05-24 RX ORDER — EVOLOCUMAB 140 MG/ML
INJECTION, SOLUTION SUBCUTANEOUS
COMMUNITY
Start: 2022-04-11

## 2022-05-24 RX ORDER — CALCIUM CARB/VITAMIN D3/VIT K1 500-500-40
400 TABLET,CHEWABLE ORAL
COMMUNITY
Start: 2022-01-19 | End: 2023-05-25 | Stop reason: ALTCHOICE

## 2022-05-24 RX ORDER — CARVEDILOL 6.25 MG/1
TABLET ORAL
COMMUNITY
Start: 2022-04-11

## 2022-05-24 RX ORDER — RIVAROXABAN 15 MG/1
15 TABLET, FILM COATED ORAL DAILY
COMMUNITY
Start: 2022-04-22 | End: 2023-05-25 | Stop reason: ALTCHOICE

## 2022-05-24 RX ORDER — GABAPENTIN 600 MG/1
600 TABLET ORAL 3 TIMES DAILY
COMMUNITY
Start: 2022-04-11 | End: 2023-05-25

## 2022-05-24 RX ORDER — TORSEMIDE 20 MG/1
40 TABLET ORAL
COMMUNITY

## 2022-05-24 NOTE — ADDENDUM NOTE
Encounter addended by: Rylie Lynn RN on: 5/24/2022 5:13 PM   Actions taken: Pharmacy for encounter modified, Order list changed, Diagnosis association updated

## 2022-05-24 NOTE — PATIENT INSTRUCTIONS
Cleanse with soap and water.  Apply Telfa and elastic bandage.( Coban)  Change daily.   Return for md visit in 2 weeks.  Reinforce with tape if needed
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
## 2022-06-09 ENCOUNTER — HOSPITAL ENCOUNTER (OUTPATIENT)
Dept: WOUND CARE | Facility: HOSPITAL | Age: 72
Discharge: HOME | End: 2022-06-09
Attending: FAMILY MEDICINE
Payer: MEDICARE

## 2022-06-09 VITALS
SYSTOLIC BLOOD PRESSURE: 129 MMHG | RESPIRATION RATE: 20 BRPM | DIASTOLIC BLOOD PRESSURE: 55 MMHG | OXYGEN SATURATION: 94 % | HEART RATE: 60 BPM | TEMPERATURE: 98 F

## 2022-06-09 DIAGNOSIS — L97.912 NONHEALING ULCER OF RIGHT LOWER LEG WITH FAT LAYER EXPOSED: Primary | ICD-10-CM

## 2022-06-09 PROCEDURE — 99212 OFFICE O/P EST SF 10 MIN: CPT

## 2022-06-09 NOTE — PROGRESS NOTES
Subjective:       Patient ID: Rahel Bagley is a 72 y.o. female.    Chief Complaint: Non-healing Wound Follow Up (Right lower leg/)    HPI  Review of Systems      Objective:      Temp:  [98.2 °F (36.8 °C)]   Pulse:  [60]   Resp:  [20]   BP: (129)/(55)   SpO2:  [94 %]   Physical Exam       Wound 05/24/22 1624 Non pressure chronic ulcer Right anterior;lower Leg (Active)   05/24/22 1624    Pre-existing: Yes   Primary Wound Type: Non pressure   Side: Right   Orientation: anterior;lower   Location: Leg   Wound Number:    Ankle-Brachial Index:    Pulses:    Removal Indication and Assessment:    Wound Outcome:    (Retired) Wound Type:    (Retired) Wound Length (cm):    (Retired) Wound Width (cm):    (Retired) Depth (cm):    Wound Description (Comments):    Removal Indications:    Wound Image   06/09/22 1500   Dressing Appearance Intact;Moist drainage 06/09/22 1500   Drainage Amount Scant 06/09/22 1500   Drainage Characteristics/Odor Serous 06/09/22 1500   Appearance Pink;Red;White;Fibrin;Granulating 06/09/22 1500   Tissue loss description Partial thickness 06/09/22 1500   Periwound Area Scar tissue 06/09/22 1500   Wound Edges Defined 06/09/22 1500   Wound Length (cm) 0.9 cm 06/09/22 1500   Wound Width (cm) 0.7 cm 06/09/22 1500   Wound Depth (cm) 0.1 cm 06/09/22 1500   Wound Volume (cm^3) 0.063 cm^3 06/09/22 1500   Wound Surface Area (cm^2) 0.63 cm^2 06/09/22 1500   Care Cleansed with:;Sterile normal saline 06/09/22 1500   Dressing Applied;Bandaid 06/09/22 1500         Assessment:     Pt examined and notes review. Apply Rlext bandaid. RTC in 2 weeks.    ICD-10-CM ICD-9-CM   1. Nonhealing ulcer of right lower leg with fat layer exposed  L97.912 707.19         Plan:   Tissue pathology and/or culture taken:  [] Yes [x] No   Sharp debridement performed:   [] Yes [x] No   Labs ordered this visit:   [] Yes [x] No   Imaging ordered this visit:   [] Yes [x] No           Orders Placed This Encounter   Procedures    Change  dressing     Cleanse with Normal Saline or soap and water  Apply Bandaid or telfa to area  Change daily    Return for follow up visit in 2 weeks .     Standing Status:   Standing     Number of Occurrences:   10     Standing Expiration Date:   8/9/2022        Follow up in about 2 weeks (around 6/23/2022) for md visit .

## 2022-06-09 NOTE — PATIENT INSTRUCTIONS
Change Dressing:  Cleanse with Normal Saline or soap and water Apply Bandaid or telfa to area Change daily Return for follow up visit in 2 weeks .

## 2022-07-07 ENCOUNTER — HOSPITAL ENCOUNTER (OUTPATIENT)
Dept: WOUND CARE | Facility: HOSPITAL | Age: 72
Discharge: HOME | End: 2022-07-07
Attending: FAMILY MEDICINE
Payer: MEDICARE

## 2022-07-07 VITALS
RESPIRATION RATE: 18 BRPM | SYSTOLIC BLOOD PRESSURE: 120 MMHG | OXYGEN SATURATION: 97 % | DIASTOLIC BLOOD PRESSURE: 57 MMHG | HEART RATE: 62 BPM | TEMPERATURE: 100 F

## 2022-07-07 DIAGNOSIS — L97.912 NONHEALING ULCER OF RIGHT LOWER LEG WITH FAT LAYER EXPOSED: Primary | ICD-10-CM

## 2022-07-07 PROCEDURE — 99213 OFFICE O/P EST LOW 20 MIN: CPT

## 2022-07-07 NOTE — PROGRESS NOTES
Subjective:       Patient ID: Rahel Bagley is a 72 y.o. female.    Chief Complaint: No chief complaint on file.    HPI  Review of Systems      Objective:      Temp:  [99.5 °F (37.5 °C)]   Pulse:  [62]   Resp:  [18]   BP: (120)/(57)   SpO2:  [97 %]   Physical Exam       Wound 05/24/22 1624 Non pressure chronic ulcer Right anterior;lower Leg (Active)   05/24/22 1624    Pre-existing: Yes   Primary Wound Type: Non pressure   Side: Right   Orientation: anterior;lower   Location: Leg   Wound Number:    Ankle-Brachial Index:    Pulses:    Removal Indication and Assessment:    Wound Outcome:    (Retired) Wound Type:    (Retired) Wound Length (cm):    (Retired) Wound Width (cm):    (Retired) Depth (cm):    Wound Description (Comments):    Removal Indications:    Wound Image   07/07/22 1500   Dressing Appearance Intact;Moist drainage 07/07/22 1500   Drainage Amount Scant 07/07/22 1500   Drainage Characteristics/Odor [place] 07/07/22 1500   Appearance Pink;Not granulating 07/07/22 1500   Tissue loss description Full thickness 07/07/22 1500   Periwound Area Scar tissue;Intact 07/07/22 1500   Wound Edges Defined 07/07/22 1500   Wound Length (cm) 0.9 cm 07/07/22 1500   Wound Width (cm) 0.9 cm 07/07/22 1500   Wound Depth (cm) 0.1 cm 07/07/22 1500   Wound Volume (cm^3) 0.081 cm^3 07/07/22 1500   Wound Surface Area (cm^2) 0.81 cm^2 07/07/22 1500   Care Cleansed with:;Sterile normal saline 07/07/22 1500   Dressing Applied;Other (comment) 07/07/22 1500         Assessment:     Pt examined and notes review above. Apply endoform to the wound. Pt to do her dressing at home Twice a week.. RTC next Friday for nurse visit.. Pt is having cataract  sx Monday. MD visit in 2 weeks.    ICD-10-CM ICD-9-CM   1. Nonhealing ulcer of right lower leg with fat layer exposed  L97.912 707.19         Plan:   Tissue pathology and/or culture taken:  [] Yes [x] No   Sharp debridement performed:   [] Yes [x] No   Labs ordered this visit:   [] Yes [x] No    Imaging ordered this visit:   [] Yes [x] No           Orders Placed This Encounter   Procedures    Change dressing     Cleanse with NS or soap and water  Apply endoform to open area,few drops of saline to moisten  Cover with telfa, coban  Change twice weekly  Follow up in wound care center next Friday     Standing Status:   Standing     Number of Occurrences:   4     Standing Expiration Date:   8/7/2022        Follow up for next friday for nurse visit, md visit in 2 weeks.

## 2022-07-07 NOTE — PATIENT INSTRUCTIONS
Cleanse with NS or soap and water  Apply endoform to open area,few drops of saline to moisten  Cover with telfa, coban  Change twice weekly  Follow up in wound care center next Friday

## 2022-07-13 ENCOUNTER — LAB VISIT (OUTPATIENT)
Dept: LAB | Facility: HOSPITAL | Age: 72
End: 2022-07-13
Attending: INTERNAL MEDICINE
Payer: MEDICARE

## 2022-07-13 DIAGNOSIS — E11.21 DIABETIC GLOMERULOPATHY: ICD-10-CM

## 2022-07-13 DIAGNOSIS — I10 ESSENTIAL HYPERTENSION, MALIGNANT: Primary | ICD-10-CM

## 2022-07-13 DIAGNOSIS — E11.42 DIABETIC POLYNEUROPATHY: ICD-10-CM

## 2022-07-13 DIAGNOSIS — E78.2 MIXED HYPERLIPIDEMIA: ICD-10-CM

## 2022-07-13 LAB
ALBUMIN SERPL-MCNC: 2.9 GM/DL (ref 3.4–4.8)
ALBUMIN/GLOB SERPL: 0.9 RATIO (ref 1.1–2)
ALP SERPL-CCNC: 43 UNIT/L (ref 40–150)
ALT SERPL-CCNC: 11 UNIT/L (ref 0–55)
APPEARANCE UR: CLEAR
AST SERPL-CCNC: 18 UNIT/L (ref 5–34)
BACTERIA #/AREA URNS AUTO: NORMAL /HPF
BILIRUB UR QL STRIP.AUTO: NEGATIVE MG/DL
BILIRUBIN DIRECT+TOT PNL SERPL-MCNC: 0.7 MG/DL
BUN SERPL-MCNC: 50.9 MG/DL (ref 9.8–20.1)
CALCIUM SERPL-MCNC: 9.5 MG/DL (ref 8.4–10.2)
CHLORIDE SERPL-SCNC: 104 MMOL/L (ref 98–107)
CHOLEST SERPL-MCNC: 69 MG/DL
CHOLEST/HDLC SERPL: 2 {RATIO} (ref 0–5)
CO2 SERPL-SCNC: 32 MMOL/L (ref 23–31)
COLOR UR AUTO: YELLOW
CREAT SERPL-MCNC: 2.45 MG/DL (ref 0.55–1.02)
CREAT UR-MCNC: 75.9 MG/DL (ref 47–110)
CRP SERPL HS-MCNC: 24 MG/L
EST. AVERAGE GLUCOSE BLD GHB EST-MCNC: 122.6 MG/DL
GLOBULIN SER-MCNC: 3.4 GM/DL (ref 2.4–3.5)
GLUCOSE SERPL-MCNC: 94 MG/DL (ref 82–115)
GLUCOSE UR QL STRIP.AUTO: NEGATIVE MG/DL
HBA1C MFR BLD: 5.9 %
HDLC SERPL-MCNC: 37 MG/DL (ref 35–60)
KETONES UR QL STRIP.AUTO: NEGATIVE MG/DL
LDLC SERPL CALC-MCNC: 21 MG/DL (ref 50–140)
LEUKOCYTE ESTERASE UR QL STRIP.AUTO: NEGATIVE UNIT/L
MICROALBUMIN UR-MCNC: 77.7 UG/ML
MICROALBUMIN/CREAT RATIO PNL UR: 102.4 MG/GM CR (ref 0–30)
NITRITE UR QL STRIP.AUTO: NEGATIVE
PH UR STRIP.AUTO: 7 [PH]
PLATELET # BLD AUTO: 246 X10(3)/MCL (ref 130–400)
POTASSIUM SERPL-SCNC: 4.1 MMOL/L (ref 3.5–5.1)
PROT SERPL-MCNC: 6.3 GM/DL (ref 5.8–7.6)
PROT UR QL STRIP.AUTO: 30 MG/DL
RBC #/AREA URNS AUTO: NORMAL /HPF
RBC UR QL AUTO: NEGATIVE UNIT/L
SODIUM SERPL-SCNC: 146 MMOL/L (ref 136–145)
SP GR UR STRIP.AUTO: 1.01
SQUAMOUS #/AREA URNS AUTO: NORMAL /HPF
TRIGL SERPL-MCNC: 56 MG/DL (ref 37–140)
UROBILINOGEN UR STRIP-ACNC: 1 MG/DL
VLDLC SERPL CALC-MCNC: 11 MG/DL
WBC #/AREA URNS AUTO: NORMAL /HPF

## 2022-07-13 PROCEDURE — 86141 C-REACTIVE PROTEIN HS: CPT

## 2022-07-13 PROCEDURE — 80053 COMPREHEN METABOLIC PANEL: CPT

## 2022-07-13 PROCEDURE — 80061 LIPID PANEL: CPT

## 2022-07-13 PROCEDURE — 84681 ASSAY OF C-PEPTIDE: CPT

## 2022-07-13 PROCEDURE — 81001 URINALYSIS AUTO W/SCOPE: CPT

## 2022-07-13 PROCEDURE — 83036 HEMOGLOBIN GLYCOSYLATED A1C: CPT

## 2022-07-13 PROCEDURE — 84075 ASSAY ALKALINE PHOSPHATASE: CPT

## 2022-07-13 PROCEDURE — 36415 COLL VENOUS BLD VENIPUNCTURE: CPT

## 2022-07-13 PROCEDURE — 82043 UR ALBUMIN QUANTITATIVE: CPT

## 2022-07-13 PROCEDURE — 85049 AUTOMATED PLATELET COUNT: CPT

## 2022-07-14 LAB — C PEPTIDE P FAST SERPL-MCNC: 4.8 NG/ML (ref 1.1–4.4)

## 2022-07-15 ENCOUNTER — HOSPITAL ENCOUNTER (OUTPATIENT)
Dept: WOUND CARE | Facility: HOSPITAL | Age: 72
Discharge: HOME | End: 2022-07-15
Attending: FAMILY MEDICINE
Payer: MEDICARE

## 2022-07-15 VITALS
OXYGEN SATURATION: 96 % | SYSTOLIC BLOOD PRESSURE: 144 MMHG | RESPIRATION RATE: 18 BRPM | TEMPERATURE: 98 F | DIASTOLIC BLOOD PRESSURE: 66 MMHG | HEART RATE: 68 BPM

## 2022-07-15 DIAGNOSIS — L97.912 NONHEALING ULCER OF RIGHT LOWER LEG WITH FAT LAYER EXPOSED: ICD-10-CM

## 2022-07-15 PROCEDURE — 99212 OFFICE O/P EST SF 10 MIN: CPT

## 2022-07-15 NOTE — PROGRESS NOTES
[facility]  [address] [place] [address]  Nurse Visit    Subjective:     Patient seen in clinic today.  Dressing changed as ordered.      Assessment:          Wound 05/24/22 1624 Non pressure chronic ulcer Right anterior;lower Leg (Active)   05/24/22 1624    Pre-existing: Yes   Primary Wound Type: Non pressure   Side: Right   Orientation: anterior;lower   Location: Leg   Wound Number:    Ankle-Brachial Index:    Pulses:    Removal Indication and Assessment:    Wound Outcome:    (Retired) Wound Type:    (Retired) Wound Length (cm):    (Retired) Wound Width (cm):    (Retired) Depth (cm):    Wound Description (Comments):    Removal Indications:    Wound Image   07/15/22 1100   Dressing Appearance Intact;Dried drainage 07/15/22 1100   Drainage Amount Small 07/15/22 1100   Drainage Characteristics/Odor Serous 07/15/22 1100   Appearance Pink;Epithelialization;Not granulating 07/15/22 1100   Wound Length (cm) 0.5 cm 07/15/22 1100   Wound Width (cm) 0.5 cm 07/15/22 1100   Wound Depth (cm) 0.1 cm 07/15/22 1100   Wound Volume (cm^3) 0.025 cm^3 07/15/22 1100   Wound Surface Area (cm^2) 0.25 cm^2 07/15/22 1100   Care Cleansed with: 07/15/22 1100   Dressing Applied;Other (comment) 07/15/22 1100         ICD-10-CM ICD-9-CM   1. Nonhealing ulcer of right lower leg with fat layer exposed  L97.912 707.19               Orders Placed This Encounter   Procedures    Change dressing     Cleanse with NS or soap and water  Apply endoform to open area,few drops of saline to moisten  Cover with telfa, coban  Change twice weekly  Follow up in wound care center next Friday     Standing Status:   Standing     Number of Occurrences:   1

## 2022-07-15 NOTE — PATIENT INSTRUCTIONS
Cleanse with NS or soap and water   Apply endoform to open area,few drops of saline to moisten   Cover with telfa, coban   Change twice weekly

## 2022-07-20 PROBLEM — N18.4 CHRONIC KIDNEY DISEASE, STAGE 4 (SEVERE): Status: ACTIVE | Noted: 2022-07-20

## 2022-07-20 RX ORDER — ACETAMINOPHEN 500 MG
500 TABLET ORAL
Status: CANCELLED
Start: 2022-07-21

## 2022-07-20 RX ORDER — DIPHENHYDRAMINE HYDROCHLORIDE 50 MG/ML
25 INJECTION INTRAMUSCULAR; INTRAVENOUS
Status: CANCELLED
Start: 2022-07-21

## 2022-07-21 ENCOUNTER — HOSPITAL ENCOUNTER (OUTPATIENT)
Dept: WOUND CARE | Facility: HOSPITAL | Age: 72
Discharge: HOME | End: 2022-07-21
Attending: FAMILY MEDICINE
Payer: MEDICARE

## 2022-07-21 ENCOUNTER — INFUSION (OUTPATIENT)
Dept: INFUSION THERAPY | Facility: HOSPITAL | Age: 72
End: 2022-07-21
Attending: INTERNAL MEDICINE
Payer: MEDICARE

## 2022-07-21 VITALS
TEMPERATURE: 98 F | OXYGEN SATURATION: 97 % | HEIGHT: 62 IN | BODY MASS INDEX: 37.17 KG/M2 | WEIGHT: 202 LBS | SYSTOLIC BLOOD PRESSURE: 128 MMHG | RESPIRATION RATE: 18 BRPM | HEART RATE: 63 BPM | DIASTOLIC BLOOD PRESSURE: 79 MMHG

## 2022-07-21 VITALS
HEART RATE: 61 BPM | TEMPERATURE: 98 F | RESPIRATION RATE: 18 BRPM | OXYGEN SATURATION: 97 % | DIASTOLIC BLOOD PRESSURE: 66 MMHG | SYSTOLIC BLOOD PRESSURE: 120 MMHG

## 2022-07-21 DIAGNOSIS — N18.4 CHRONIC KIDNEY DISEASE, STAGE 4 (SEVERE): ICD-10-CM

## 2022-07-21 DIAGNOSIS — L97.912 NONHEALING ULCER OF RIGHT LOWER LEG WITH FAT LAYER EXPOSED: ICD-10-CM

## 2022-07-21 DIAGNOSIS — N18.4 CHRONIC KIDNEY DISEASE, STAGE 4 (SEVERE): Primary | ICD-10-CM

## 2022-07-21 PROCEDURE — 99213 OFFICE O/P EST LOW 20 MIN: CPT | Mod: 25

## 2022-07-21 PROCEDURE — 25000003 PHARM REV CODE 250: Performed by: INTERNAL MEDICINE

## 2022-07-21 PROCEDURE — 96365 THER/PROPH/DIAG IV INF INIT: CPT

## 2022-07-21 PROCEDURE — 96375 TX/PRO/DX INJ NEW DRUG ADDON: CPT

## 2022-07-21 PROCEDURE — 63600175 PHARM REV CODE 636 W HCPCS: Performed by: INTERNAL MEDICINE

## 2022-07-21 RX ORDER — ACETAMINOPHEN 500 MG
500 TABLET ORAL
Status: CANCELLED
Start: 2022-07-21

## 2022-07-21 RX ORDER — DIPHENHYDRAMINE HYDROCHLORIDE 50 MG/ML
25 INJECTION INTRAMUSCULAR; INTRAVENOUS
Status: CANCELLED
Start: 2022-07-21

## 2022-07-21 RX ORDER — DIPHENHYDRAMINE HYDROCHLORIDE 50 MG/ML
25 INJECTION INTRAMUSCULAR; INTRAVENOUS
Status: COMPLETED | OUTPATIENT
Start: 2022-07-21 | End: 2022-07-21

## 2022-07-21 RX ORDER — ACETAMINOPHEN 500 MG
500 TABLET ORAL
Status: COMPLETED | OUTPATIENT
Start: 2022-07-21 | End: 2022-07-21

## 2022-07-21 RX ADMIN — DIPHENHYDRAMINE HYDROCHLORIDE 25 MG: 50 INJECTION, SOLUTION INTRAMUSCULAR; INTRAVENOUS at 10:07

## 2022-07-21 RX ADMIN — ACETAMINOPHEN 500 MG: 500 TABLET, FILM COATED ORAL at 10:07

## 2022-07-21 RX ADMIN — FERRIC CARBOXYMALTOSE INJECTION 750 MG: 50 INJECTION, SOLUTION INTRAVENOUS at 11:07

## 2022-07-21 NOTE — PROGRESS NOTES
Subjective:       Patient ID: Rahel Bagley is a 72 y.o. female.    Chief Complaint: Wound Care (R lower leg ulcer follow up, MD visit, 3 new small ulcerations to R lower leg)    HPI  Review of Systems      Objective:      Temp:  [98.2 °F (36.8 °C)-98.4 °F (36.9 °C)]   Pulse:  [61-63]   Resp:  [18]   BP: (120-128)/(66-79)   SpO2:  [97 %]   Physical Exam       Wound 05/24/22 1624 Non pressure chronic ulcer Right anterior;lower Leg (Active)   05/24/22 1624    Pre-existing: Yes   Primary Wound Type: Non pressure   Side: Right   Orientation: anterior;lower   Location: Leg   Wound Number:    Ankle-Brachial Index:    Pulses:    Removal Indication and Assessment:    Wound Outcome:    (Retired) Wound Type:    (Retired) Wound Length (cm):    (Retired) Wound Width (cm):    (Retired) Depth (cm):    Wound Description (Comments):    Removal Indications:    Dressing Appearance Intact;Dried drainage 07/21/22 1400   Drainage Amount Small 07/21/22 1400   Drainage Characteristics/Odor Serous 07/21/22 1400   Appearance Pink;Epithelialization 07/21/22 1400   Periwound Area Scar tissue 07/21/22 1400   Wound Length (cm) 0.4 cm 07/21/22 1400   Wound Width (cm) 0.5 cm 07/21/22 1400   Wound Depth (cm) 0.1 cm 07/21/22 1400   Wound Volume (cm^3) 0.02 cm^3 07/21/22 1400   Wound Surface Area (cm^2) 0.2 cm^2 07/21/22 1400   Care Cleansed with:;Antimicrobial agent 07/21/22 1400   Dressing Applied 07/21/22 1400            Wound 07/21/22 1417 Ulceration Right anterior;distal;lower Leg (Active)   07/21/22 1417    Pre-existing:    Primary Wound Type: Ulceration   Side: Right   Orientation: anterior;distal;lower   Location: Leg   Wound Number:    Ankle-Brachial Index:    Pulses:    Removal Indication and Assessment:    Wound Outcome:    (Retired) Wound Type:    (Retired) Wound Length (cm):    (Retired) Wound Width (cm):    (Retired) Depth (cm):    Wound Description (Comments):    Removal Indications:    Wound Image   07/21/22 1400   Dressing  Appearance Intact;Dried drainage 07/21/22 1400   Drainage Amount Scant 07/21/22 1400   Drainage Characteristics/Odor Serous 07/21/22 1400   Appearance Pink;Red 07/21/22 1400   Tissue loss description Full thickness 07/21/22 1400   Periwound Area Intact;Scar tissue 07/21/22 1400   Wound Edges Defined 07/21/22 1400   Wound Length (cm) 6 cm 07/21/22 1400   Wound Width (cm) 3 cm 07/21/22 1400   Wound Depth (cm) 0.1 cm 07/21/22 1400   Wound Volume (cm^3) 1.8 cm^3 07/21/22 1400   Wound Surface Area (cm^2) 18 cm^2 07/21/22 1400   Care Cleansed with:;Antimicrobial agent 07/21/22 1400   Dressing Applied 07/21/22 1400   Periwound Care Skin barrier film applied 07/21/22 1400         Assessment:     Pt examined and notes review above. Pt have 3 more small ulcer in the right lower leg, new ulcers. Wound is almost close, a lot of granulation and epithelial tissue. Continue endoform , change twice weekly. Rtc Friday and 1 week with doctor.    ICD-10-CM ICD-9-CM   1. Nonhealing ulcer of right lower leg with fat layer exposed  L97.912 707.19         Plan:   Tissue pathology and/or culture taken:  [] Yes [x] No   Sharp debridement performed:   [] Yes [x] No   Labs ordered this visit:   [] Yes [x] No   Imaging ordered this visit:   [] Yes [x] No           Orders Placed This Encounter   Procedures    Change dressing     Cleanse with NS or soap and water  Apply endoform to open area,few drops of saline to moisten  Cover with telfa, coban  Change twice weekly  Follow up in wound care center next Friday     Standing Status:   Standing     Number of Occurrences:   1        Follow up in about 1 week (around 7/28/2022) for MD visit.

## 2022-07-28 ENCOUNTER — HOSPITAL ENCOUNTER (OUTPATIENT)
Dept: WOUND CARE | Facility: HOSPITAL | Age: 72
Discharge: HOME | End: 2022-07-28
Attending: FAMILY MEDICINE
Payer: MEDICARE

## 2022-07-28 ENCOUNTER — INFUSION (OUTPATIENT)
Dept: INFUSION THERAPY | Facility: HOSPITAL | Age: 72
End: 2022-07-28
Attending: INTERNAL MEDICINE
Payer: MEDICARE

## 2022-07-28 VITALS
SYSTOLIC BLOOD PRESSURE: 139 MMHG | TEMPERATURE: 98 F | HEART RATE: 66 BPM | RESPIRATION RATE: 18 BRPM | DIASTOLIC BLOOD PRESSURE: 69 MMHG | OXYGEN SATURATION: 98 %

## 2022-07-28 VITALS
WEIGHT: 201.94 LBS | TEMPERATURE: 98 F | BODY MASS INDEX: 37.16 KG/M2 | DIASTOLIC BLOOD PRESSURE: 65 MMHG | SYSTOLIC BLOOD PRESSURE: 131 MMHG | HEIGHT: 62 IN | OXYGEN SATURATION: 96 % | HEART RATE: 79 BPM | RESPIRATION RATE: 18 BRPM

## 2022-07-28 DIAGNOSIS — N18.4 CHRONIC KIDNEY DISEASE, STAGE 4 (SEVERE): Primary | ICD-10-CM

## 2022-07-28 DIAGNOSIS — L97.912 NONHEALING ULCER OF RIGHT LOWER LEG WITH FAT LAYER EXPOSED: ICD-10-CM

## 2022-07-28 PROCEDURE — 63600175 PHARM REV CODE 636 W HCPCS: Performed by: INTERNAL MEDICINE

## 2022-07-28 PROCEDURE — 96365 THER/PROPH/DIAG IV INF INIT: CPT

## 2022-07-28 PROCEDURE — 96375 TX/PRO/DX INJ NEW DRUG ADDON: CPT

## 2022-07-28 PROCEDURE — 25000003 PHARM REV CODE 250: Performed by: INTERNAL MEDICINE

## 2022-07-28 PROCEDURE — 99213 OFFICE O/P EST LOW 20 MIN: CPT | Mod: 25

## 2022-07-28 RX ORDER — ACETAMINOPHEN 500 MG
500 TABLET ORAL
Status: CANCELLED
Start: 2022-07-28

## 2022-07-28 RX ORDER — ACETAMINOPHEN 500 MG
500 TABLET ORAL
Status: COMPLETED | OUTPATIENT
Start: 2022-07-28 | End: 2022-07-28

## 2022-07-28 RX ORDER — DIPHENHYDRAMINE HYDROCHLORIDE 50 MG/ML
25 INJECTION INTRAMUSCULAR; INTRAVENOUS
Status: COMPLETED | OUTPATIENT
Start: 2022-07-28 | End: 2022-07-28

## 2022-07-28 RX ORDER — DIPHENHYDRAMINE HYDROCHLORIDE 50 MG/ML
25 INJECTION INTRAMUSCULAR; INTRAVENOUS
Status: CANCELLED
Start: 2022-07-28

## 2022-07-28 RX ADMIN — DIPHENHYDRAMINE HYDROCHLORIDE 25 MG: 50 INJECTION, SOLUTION INTRAMUSCULAR; INTRAVENOUS at 01:07

## 2022-07-28 RX ADMIN — ACETAMINOPHEN 500 MG: 500 TABLET, FILM COATED ORAL at 01:07

## 2022-07-28 RX ADMIN — FERRIC CARBOXYMALTOSE INJECTION 750 MG: 50 INJECTION, SOLUTION INTRAVENOUS at 01:07

## 2022-07-28 NOTE — PROGRESS NOTES
Subjective:       Patient ID: Rahel Bagley is a 72 y.o. female.    Chief Complaint: Non-healing Wound Follow Up (R anterior lower leg - md visit)    HPI  Review of Systems      Objective:      Temp:  [98.2 °F (36.8 °C)-98.4 °F (36.9 °C)]   Pulse:  [66-79]   Resp:  [18]   BP: (131-139)/(65-69)   SpO2:  [96 %-98 %]   Physical Exam       Wound 05/24/22 1624 Non pressure chronic ulcer Right anterior;lower Leg (Active)   05/24/22 1624    Pre-existing: Yes   Primary Wound Type: Non pressure   Side: Right   Orientation: anterior;lower   Location: Leg   Wound Number:    Ankle-Brachial Index:    Pulses:    Removal Indication and Assessment:    Wound Outcome:    (Retired) Wound Type:    (Retired) Wound Length (cm):    (Retired) Wound Width (cm):    (Retired) Depth (cm):    Wound Description (Comments):    Removal Indications:    Dressing Appearance Intact;Dried drainage 07/28/22 1500   Drainage Amount Scant 07/28/22 1500   Drainage Characteristics/Odor Serous 07/28/22 1500   Appearance Pink;Not granulating 07/28/22 1500   Red (%), Wound Tissue Color 100 % 07/28/22 1500   Periwound Area Scar tissue 07/28/22 1500   Wound Length (cm) 0.5 cm 07/28/22 1500   Wound Width (cm) 0.3 cm 07/28/22 1500   Wound Depth (cm) 0.1 cm 07/28/22 1500   Wound Volume (cm^3) 0.015 cm^3 07/28/22 1500   Wound Surface Area (cm^2) 0.15 cm^2 07/28/22 1500   Care Cleansed with:;Antimicrobial agent 07/28/22 1500   Dressing Applied;Other (comment) 07/28/22 1500            Wound 07/21/22 1417 Ulceration Right anterior;distal;lower Leg (Active)   07/21/22 1417    Pre-existing:    Primary Wound Type: Ulceration   Side: Right   Orientation: anterior;distal;lower   Location: Leg   Wound Number:    Ankle-Brachial Index:    Pulses:    Removal Indication and Assessment:    Wound Outcome:    (Retired) Wound Type:    (Retired) Wound Length (cm):    (Retired) Wound Width (cm):    (Retired) Depth (cm):    Wound Description (Comments):    Removal Indications:     Dressing Appearance Intact;Dried drainage;Open to air 07/28/22 1500   Drainage Amount None 07/28/22 1500   Appearance Other (see comments);Closed/resurfaced 07/28/22 1500   Periwound Area Intact;Scar tissue 07/28/22 1500   Wound Edges Defined 07/28/22 1500   Wound Length (cm) 0 cm 07/28/22 1500   Wound Width (cm) 0 cm 07/28/22 1500   Wound Depth (cm) 0 cm 07/28/22 1500   Wound Volume (cm^3) 0 cm^3 07/28/22 1500   Wound Surface Area (cm^2) 0 cm^2 07/28/22 1500   Care Cleansed with:;Antimicrobial agent 07/28/22 1500   Dressing Other (comment) 07/28/22 1500   Periwound Care Skin barrier film applied 07/28/22 1500         Assessment:     Pt examined and notes review above.Apply endoform to open areas. Change twice a week. RTC next friday    ICD-10-CM ICD-9-CM   1. Nonhealing ulcer of right lower leg with fat layer exposed  L97.912 707.19         Plan:   Tissue pathology and/or culture taken:  [] Yes [x] No   Sharp debridement performed:   [] Yes [x] No   Labs ordered this visit:   [] Yes [x] No   Imaging ordered this visit:   [] Yes [x] No           Orders Placed This Encounter   Procedures    Change dressing     Cleanse with NS or soap and water  Apply endoform to open area,few drops of saline to moisten  Cover with telfa, coban  Change twice weekly  Follow up in wound care center next Friday     Standing Status:   Standing     Number of Occurrences:   1        Follow up in about 1 week (around 8/4/2022) for md visit .

## 2022-07-28 NOTE — PATIENT INSTRUCTIONS
Cleanse with NS or soap and water   Apply endoform to open area,few drops of saline to moisten   Cover with telfa, coban   Change twice weekly   Follow up in wound care center in 1 week

## 2022-08-04 ENCOUNTER — HOSPITAL ENCOUNTER (OUTPATIENT)
Dept: WOUND CARE | Facility: HOSPITAL | Age: 72
Discharge: HOME | End: 2022-08-04
Attending: FAMILY MEDICINE
Payer: MEDICARE

## 2022-08-04 VITALS
HEART RATE: 60 BPM | DIASTOLIC BLOOD PRESSURE: 68 MMHG | RESPIRATION RATE: 18 BRPM | TEMPERATURE: 98 F | SYSTOLIC BLOOD PRESSURE: 149 MMHG | OXYGEN SATURATION: 99 %

## 2022-08-04 DIAGNOSIS — L97.912 NONHEALING ULCER OF RIGHT LOWER LEG WITH FAT LAYER EXPOSED: ICD-10-CM

## 2022-08-04 PROCEDURE — 99213 PR OFFICE/OUTPT VISIT, EST, LEVL III, 20-29 MIN: ICD-10-PCS | Mod: ,,, | Performed by: FAMILY MEDICINE

## 2022-08-04 PROCEDURE — 99213 OFFICE O/P EST LOW 20 MIN: CPT

## 2022-08-04 PROCEDURE — 99213 OFFICE O/P EST LOW 20 MIN: CPT | Mod: ,,, | Performed by: FAMILY MEDICINE

## 2022-08-04 NOTE — PROGRESS NOTES
Subjective:       Patient ID: Rahel Bagley is a 72 y.o. female.    Chief Complaint: Wound Care (RLE ulcer)    HPI  Review of Systems      Objective:      Temp:  [98.2 °F (36.8 °C)]   Pulse:  [60]   Resp:  [18]   BP: (149)/(68)   SpO2:  [99 %]   Physical Exam       Wound 05/24/22 1624 Non pressure chronic ulcer Right anterior;lower Leg (Active)   05/24/22 1624    Pre-existing: Yes   Primary Wound Type: Non pressure   Side: Right   Orientation: anterior;lower   Location: Leg   Wound Number:    Ankle-Brachial Index:    Pulses:    Removal Indication and Assessment:    Wound Outcome:    (Retired) Wound Type:    (Retired) Wound Length (cm):    (Retired) Wound Width (cm):    (Retired) Depth (cm):    Wound Description (Comments):    Removal Indications:    Wound Image   08/04/22 1000   Dressing Appearance Intact;Dried drainage 08/04/22 1000   Drainage Amount Small 08/04/22 1000   Drainage Characteristics/Odor Sanguineous 08/04/22 1000   Appearance Pink;Epithelialization;Not granulating 08/04/22 1000   Tissue loss description Partial thickness 08/04/22 1000   Periwound Area Scar tissue 08/04/22 1000   Wound Edges Irregular 08/04/22 1000   Wound Length (cm) 0.3 cm 08/04/22 1000   Wound Width (cm) 0.3 cm 08/04/22 1000   Wound Depth (cm) 0.1 cm 08/04/22 1000   Wound Volume (cm^3) 0.009 cm^3 08/04/22 1000   Wound Surface Area (cm^2) 0.09 cm^2 08/04/22 1000   Care Cleansed with:;Antimicrobial agent;Sterile normal saline 08/04/22 1000   Dressing Other (comment) 08/04/22 1000   Periwound Care Skin barrier film applied;Other (see comments) 08/04/22 1000         Assessment:     Pt examined and notes review above. Apply marathon to the small ulcer. And reapply 3-4 days  A lot of epithelial tissue , only one small spot in the wound. Open air. RTC in 1 week    ICD-10-CM ICD-9-CM   1. Nonhealing ulcer of right lower leg with fat layer exposed  L97.912 707.19         Plan:   Tissue pathology and/or culture taken:  [] Yes [x] No    Sharp debridement performed:   [] Yes [x] No   Labs ordered this visit:   [] Yes [x] No   Imaging ordered this visit:   [] Yes [x] No           Orders Placed This Encounter   Procedures    Change dressing     Cleanse with NS or soap and water  Apply marathon today, patient to reapply to wound in 3-4 days if needed   Continue to protect area  Follow up in 1 week     Standing Status:   Standing     Number of Occurrences:   1     Standing Expiration Date:   8/4/2022        Follow up in about 1 week (around 8/11/2022) for for MD visit.

## 2022-08-04 NOTE — PATIENT INSTRUCTIONS
Apply marathon to wound in 3-4 days if needed   Continue to protect area  Leave open to air  Follow up in wound care center in 1 week

## 2022-08-11 ENCOUNTER — HOSPITAL ENCOUNTER (OUTPATIENT)
Dept: WOUND CARE | Facility: HOSPITAL | Age: 72
Discharge: HOME | End: 2022-08-11
Attending: FAMILY MEDICINE
Payer: MEDICARE

## 2022-08-11 VITALS
HEART RATE: 62 BPM | SYSTOLIC BLOOD PRESSURE: 138 MMHG | TEMPERATURE: 98 F | DIASTOLIC BLOOD PRESSURE: 63 MMHG | RESPIRATION RATE: 18 BRPM | OXYGEN SATURATION: 97 %

## 2022-08-11 DIAGNOSIS — L97.912 NONHEALING ULCER OF RIGHT LOWER LEG WITH FAT LAYER EXPOSED: Primary | ICD-10-CM

## 2022-08-11 PROCEDURE — 99214 PR OFFICE/OUTPT VISIT, EST, LEVL IV, 30-39 MIN: ICD-10-PCS | Mod: ,,, | Performed by: FAMILY MEDICINE

## 2022-08-11 PROCEDURE — 99213 OFFICE O/P EST LOW 20 MIN: CPT

## 2022-08-11 PROCEDURE — 99214 OFFICE O/P EST MOD 30 MIN: CPT | Mod: ,,, | Performed by: FAMILY MEDICINE

## 2022-08-11 NOTE — PATIENT INSTRUCTIONS
Apply marathon to wound in 3-4 days if needed   Continue to protect area  Leave open to air  Follow up in wound care center in 2 weeks

## 2022-08-11 NOTE — PROGRESS NOTES
Subjective:       Patient ID: Rahel Bagley is a 72 y.o. female.    Chief Complaint: Wound Care (RLE ulcer)    HPI  Review of Systems      Objective:      Temp:  [98.2 °F (36.8 °C)]   Pulse:  [62]   Resp:  [18]   BP: (138)/(63)   SpO2:  [97 %]   Physical Exam       Wound 05/24/22 1624 Non pressure chronic ulcer Right anterior;lower Leg (Active)   05/24/22 1624    Pre-existing: Yes   Primary Wound Type: Non pressure   Side: Right   Orientation: anterior;lower   Location: Leg   Wound Number:    Ankle-Brachial Index:    Pulses:    Removal Indication and Assessment:    Wound Outcome:    (Retired) Wound Type:    (Retired) Wound Length (cm):    (Retired) Wound Width (cm):    (Retired) Depth (cm):    Wound Description (Comments):    Removal Indications:    Wound Image   08/11/22 1500   Dressing Appearance Open to air 08/11/22 1500   Drainage Amount None 08/11/22 1500   Appearance Closed/resurfaced;Other (see comments) 08/11/22 1500   Periwound Area Scar tissue 08/11/22 1500   Wound Edges Irregular 08/11/22 1500   Wound Length (cm) 0.3 cm 08/11/22 1500   Wound Width (cm) 0.3 cm 08/11/22 1500   Wound Depth (cm) 0.1 cm 08/11/22 1500   Wound Volume (cm^3) 0.009 cm^3 08/11/22 1500   Wound Surface Area (cm^2) 0.09 cm^2 08/11/22 1500   Care Cleansed with:;Sterile normal saline 08/11/22 1500   Dressing Other (comment) 08/11/22 1500   Periwound Care Skin barrier film applied;Other (see comments) 08/11/22 1500         Assessment:     pt examined and notes review above. Marathon apply to the wound and repeat in 3-4 days if necessary. RTC in 1 week    ICD-10-CM ICD-9-CM   1. Nonhealing ulcer of right lower leg with fat layer exposed  L97.912 707.19         Plan:   Tissue pathology and/or culture taken:  [] Yes [] No   Sharp debridement performed:   [] Yes [] No   Labs ordered this visit:   [] Yes [] No   Imaging ordered this visit:   [] Yes [] No           Orders Placed This Encounter   Procedures    Change dressing      Apply marathon to wound in 3-4 days if needed   Continue to protect area  Leave open to air  Follow up in wound care center in 2 weeks     Standing Status:   Standing     Number of Occurrences:   5     Standing Expiration Date:   10/11/2022        Follow up in about 2 weeks (around 8/25/2022) for for MD visit.

## 2022-08-25 ENCOUNTER — HOSPITAL ENCOUNTER (OUTPATIENT)
Dept: WOUND CARE | Facility: HOSPITAL | Age: 72
Discharge: HOME | End: 2022-08-25
Attending: FAMILY MEDICINE
Payer: MEDICARE

## 2022-08-25 VITALS
DIASTOLIC BLOOD PRESSURE: 63 MMHG | RESPIRATION RATE: 18 BRPM | OXYGEN SATURATION: 94 % | BODY MASS INDEX: 37.16 KG/M2 | HEIGHT: 62 IN | WEIGHT: 201.94 LBS | HEART RATE: 63 BPM | TEMPERATURE: 98 F | SYSTOLIC BLOOD PRESSURE: 136 MMHG

## 2022-08-25 DIAGNOSIS — M61.40 POSTOPERATIVE HETEROTOPIC CALCIFICATION: ICD-10-CM

## 2022-08-25 DIAGNOSIS — M96.89 POSTOPERATIVE HETEROTOPIC CALCIFICATION: ICD-10-CM

## 2022-08-25 DIAGNOSIS — L97.912 NONHEALING ULCER OF RIGHT LOWER LEG WITH FAT LAYER EXPOSED: Primary | ICD-10-CM

## 2022-08-25 PROCEDURE — 99213 OFFICE O/P EST LOW 20 MIN: CPT

## 2022-08-25 PROCEDURE — 99214 OFFICE O/P EST MOD 30 MIN: CPT | Mod: ,,, | Performed by: FAMILY MEDICINE

## 2022-08-25 PROCEDURE — 99214 PR OFFICE/OUTPT VISIT, EST, LEVL IV, 30-39 MIN: ICD-10-PCS | Mod: ,,, | Performed by: FAMILY MEDICINE

## 2022-08-25 NOTE — PROGRESS NOTES
Subjective:       Patient ID: Rahel Bagley is a 72 y.o. female.    Chief Complaint: Venous Ulcer and rt lower extremity ulcer (Physician recheck and wound care/)    HPI  Review of Systems      Objective:      Temp:  [98.1 °F (36.7 °C)]   Pulse:  [63]   Resp:  [18]   BP: (136)/(63)   SpO2:  [94 %]   Physical Exam       Wound 05/24/22 1624 Non pressure chronic ulcer Right anterior;lower Leg (Active)   05/24/22 1624    Pre-existing: Yes   Primary Wound Type: Non pressure   Side: Right   Orientation: anterior;lower   Location: Leg   Wound Number:    Ankle-Brachial Index:    Pulses:    Removal Indication and Assessment:    Wound Outcome:    (Retired) Wound Type:    (Retired) Wound Length (cm):    (Retired) Wound Width (cm):    (Retired) Depth (cm):    Wound Description (Comments):    Removal Indications:    Wound Image   08/25/22 1500   Dressing Appearance Open to air 08/25/22 1500   Drainage Amount Scant 08/25/22 1500   Drainage Characteristics/Odor Serosanguineous 08/25/22 1500   Appearance Closed/resurfaced 08/25/22 1500   Tissue loss description Partial thickness 08/25/22 1500   Periwound Area Scar tissue 08/25/22 1500   Wound Edges Irregular 08/25/22 1500   Wound Length (cm) 0.3 cm 08/25/22 1500   Wound Width (cm) 0.3 cm 08/25/22 1500   Wound Depth (cm) 0.1 cm 08/25/22 1500   Wound Volume (cm^3) 0.009 cm^3 08/25/22 1500   Wound Surface Area (cm^2) 0.09 cm^2 08/25/22 1500   Care Cleansed with:;Sterile normal saline 08/25/22 1500   Dressing Applied;Bandaid 08/25/22 1500   Periwound Care Skin barrier film applied 08/25/22 1500         Assessment:     Pt examined and notes review above. Wound is almost close. Apply bandaid to coer it. RTC in 3 weeks or as needed    ICD-10-CM ICD-9-CM   1. Nonhealing ulcer of right lower leg with fat layer exposed  L97.912 707.19   2. Postoperative heterotopic calcification  M96.89 728.13    M61.40          Plan:   Tissue pathology and/or culture taken:  [] Yes [x] No   Sharp  debridement performed:   [] Yes [x] No   Labs ordered this visit:   [] Yes [x] No   Imaging ordered this visit:   [] Yes [x] No           Orders Placed This Encounter   Procedures    Change dressing     Cleanse with soap and water,   Pat dry  Apply cover dressing daily    Call for earlier appointment if needed     Standing Status:   Standing     Number of Occurrences:   4     Standing Expiration Date:   10/25/2022        Follow up in about 3 weeks (around 9/15/2022) for md visit .

## 2022-09-06 ENCOUNTER — LAB VISIT (OUTPATIENT)
Dept: LAB | Facility: HOSPITAL | Age: 72
End: 2022-09-06
Attending: INTERNAL MEDICINE
Payer: MEDICARE

## 2022-09-06 DIAGNOSIS — E11.22 TYPE 2 DIABETES MELLITUS WITH END-STAGE RENAL DISEASE: ICD-10-CM

## 2022-09-06 DIAGNOSIS — N18.9 ANEMIA OF CHRONIC RENAL FAILURE, UNSPECIFIED CKD STAGE: ICD-10-CM

## 2022-09-06 DIAGNOSIS — N18.6 TYPE 2 DIABETES MELLITUS WITH END-STAGE RENAL DISEASE: ICD-10-CM

## 2022-09-06 DIAGNOSIS — N18.4 CHRONIC KIDNEY DISEASE, STAGE IV (SEVERE): Primary | ICD-10-CM

## 2022-09-06 DIAGNOSIS — D63.1 ANEMIA OF CHRONIC RENAL FAILURE, UNSPECIFIED CKD STAGE: ICD-10-CM

## 2022-09-06 LAB
ALBUMIN SERPL-MCNC: 3.2 GM/DL (ref 3.4–4.8)
ALBUMIN/GLOB SERPL: 1 RATIO (ref 1.1–2)
ALP SERPL-CCNC: 64 UNIT/L (ref 40–150)
ALT SERPL-CCNC: 11 UNIT/L (ref 0–55)
AST SERPL-CCNC: 21 UNIT/L (ref 5–34)
BASOPHILS # BLD AUTO: 0.02 X10(3)/MCL (ref 0–0.2)
BASOPHILS NFR BLD AUTO: 0.6 %
BILIRUBIN DIRECT+TOT PNL SERPL-MCNC: 1 MG/DL
BUN SERPL-MCNC: 35.9 MG/DL (ref 9.8–20.1)
CALCIUM SERPL-MCNC: 9.7 MG/DL (ref 8.4–10.2)
CHLORIDE SERPL-SCNC: 101 MMOL/L (ref 98–107)
CO2 SERPL-SCNC: 33 MMOL/L (ref 23–31)
CREAT SERPL-MCNC: 1.82 MG/DL (ref 0.55–1.02)
DEPRECATED CALCIDIOL+CALCIFEROL SERPL-MC: 32.1 NG/ML (ref 30–80)
EOSINOPHIL # BLD AUTO: 0.08 X10(3)/MCL (ref 0–0.9)
EOSINOPHIL NFR BLD AUTO: 2.6 %
ERYTHROCYTE [DISTWIDTH] IN BLOOD BY AUTOMATED COUNT: 15.1 % (ref 11.5–17)
FERRITIN SERPL-MCNC: 685.1 NG/ML (ref 4.63–204)
GFR SERPLBLD CREATININE-BSD FMLA CKD-EPI: 29 MLS/MIN/1.73/M2
GLOBULIN SER-MCNC: 3.1 GM/DL (ref 2.4–3.5)
GLUCOSE SERPL-MCNC: 123 MG/DL (ref 82–115)
HCT VFR BLD AUTO: 34.5 % (ref 37–47)
HGB BLD-MCNC: 10.4 GM/DL (ref 12–16)
IMM GRANULOCYTES # BLD AUTO: 0 X10(3)/MCL (ref 0–0.04)
IMM GRANULOCYTES NFR BLD AUTO: 0 %
IRON SATN MFR SERPL: 25 % (ref 20–50)
IRON SERPL-MCNC: 68 UG/DL (ref 50–170)
LYMPHOCYTES # BLD AUTO: 0.77 X10(3)/MCL (ref 0.6–4.6)
LYMPHOCYTES NFR BLD AUTO: 25 %
MAGNESIUM SERPL-MCNC: 2.1 MG/DL (ref 1.6–2.6)
MCH RBC QN AUTO: 28.6 PG (ref 27–31)
MCHC RBC AUTO-ENTMCNC: 30.1 MG/DL (ref 33–36)
MCV RBC AUTO: 94.8 FL (ref 80–94)
MONOCYTES # BLD AUTO: 0.34 X10(3)/MCL (ref 0.1–1.3)
MONOCYTES NFR BLD AUTO: 11 %
NEUTROPHILS # BLD AUTO: 1.9 X10(3)/MCL (ref 2.1–9.2)
NEUTROPHILS NFR BLD AUTO: 60.8 %
PHOSPHATE SERPL-MCNC: 2.8 MG/DL (ref 2.3–4.7)
PLATELET # BLD AUTO: 229 X10(3)/MCL (ref 130–400)
PMV BLD AUTO: 10.1 FL (ref 7.4–10.4)
POTASSIUM SERPL-SCNC: 3.5 MMOL/L (ref 3.5–5.1)
PROT SERPL-MCNC: 6.3 GM/DL (ref 5.8–7.6)
PTH-INTACT SERPL-MCNC: 120.7 PG/ML (ref 8.7–77)
RBC # BLD AUTO: 3.64 X10(6)/MCL (ref 4.2–5.4)
SODIUM SERPL-SCNC: 142 MMOL/L (ref 136–145)
TIBC SERPL-MCNC: 199 UG/DL (ref 70–310)
TIBC SERPL-MCNC: 267 UG/DL (ref 250–450)
TRANSFERRIN SERPL-MCNC: 237 MG/DL (ref 173–360)
WBC # SPEC AUTO: 3.1 X10(3)/MCL (ref 4.5–11.5)

## 2022-09-06 PROCEDURE — 85025 COMPLETE CBC W/AUTO DIFF WBC: CPT

## 2022-09-06 PROCEDURE — 80053 COMPREHEN METABOLIC PANEL: CPT

## 2022-09-06 PROCEDURE — 82728 ASSAY OF FERRITIN: CPT

## 2022-09-06 PROCEDURE — 82306 VITAMIN D 25 HYDROXY: CPT

## 2022-09-06 PROCEDURE — 83970 ASSAY OF PARATHORMONE: CPT

## 2022-09-06 PROCEDURE — 83540 ASSAY OF IRON: CPT

## 2022-09-06 PROCEDURE — 84100 ASSAY OF PHOSPHORUS: CPT

## 2022-09-06 PROCEDURE — 83735 ASSAY OF MAGNESIUM: CPT

## 2022-09-06 PROCEDURE — 36415 COLL VENOUS BLD VENIPUNCTURE: CPT

## 2022-10-04 ENCOUNTER — HOSPITAL ENCOUNTER (OUTPATIENT)
Dept: WOUND CARE | Facility: HOSPITAL | Age: 72
Discharge: HOME | End: 2022-10-04
Attending: FAMILY MEDICINE
Payer: MEDICARE

## 2022-10-04 VITALS
OXYGEN SATURATION: 95 % | SYSTOLIC BLOOD PRESSURE: 133 MMHG | DIASTOLIC BLOOD PRESSURE: 55 MMHG | HEART RATE: 61 BPM | TEMPERATURE: 98 F | RESPIRATION RATE: 18 BRPM

## 2022-10-04 DIAGNOSIS — L97.912 NONHEALING ULCER OF RIGHT LOWER LEG WITH FAT LAYER EXPOSED: ICD-10-CM

## 2022-10-04 PROCEDURE — 99213 OFFICE O/P EST LOW 20 MIN: CPT

## 2022-10-04 PROCEDURE — 99213 OFFICE O/P EST LOW 20 MIN: CPT | Mod: ,,, | Performed by: PEDIATRICS

## 2022-10-04 PROCEDURE — 99213 PR OFFICE/OUTPT VISIT, EST, LEVL III, 20-29 MIN: ICD-10-PCS | Mod: ,,, | Performed by: PEDIATRICS

## 2022-10-04 NOTE — PROGRESS NOTES
Subjective:       Patient ID: Rahel Bagley is a 72 y.o. female.    Chief Complaint: Non-healing Wound Follow Up (R anterior lower leg ulcer, md visit for follow up evaluation)    HPI  Review of Systems      Objective:      Temp:  [98.4 °F (36.9 °C)]   Pulse:  [61]   Resp:  [18]   BP: (133)/(55)   SpO2:  [95 %]   Physical Exam       Wound 05/24/22 1624 Non pressure chronic ulcer Right anterior;lower Leg (Active)   05/24/22 1624    Pre-existing: Yes   Primary Wound Type: Non pressure   Side: Right   Orientation: anterior;lower   Location: Leg   Wound Number:    Ankle-Brachial Index:    Pulses:    Removal Indication and Assessment:    Wound Outcome:    (Retired) Wound Type:    (Retired) Wound Length (cm):    (Retired) Wound Width (cm):    (Retired) Depth (cm):    Wound Description (Comments):    Removal Indications:    Wound Image   10/04/22 1449   Dressing Appearance Intact;Moist drainage 10/04/22 1449   Drainage Amount Small 10/04/22 1449   Drainage Characteristics/Odor Sanguineous;Bleeding controlled 10/04/22 1449   Appearance Red;Not granulating;Other (see comments) 10/04/22 1449   Tissue loss description Partial thickness 10/04/22 1449   Periwound Area Contracted;Scar tissue 10/04/22 1449   Wound Edges Defined 10/04/22 1449   Wound Length (cm) 0.4 cm 10/04/22 1449   Wound Width (cm) 0.3 cm 10/04/22 1449   Wound Depth (cm) 0.1 cm 10/04/22 1449   Wound Volume (cm^3) 0.012 cm^3 10/04/22 1449   Wound Surface Area (cm^2) 0.12 cm^2 10/04/22 1449   Care Cleansed with:;Antimicrobial agent 10/04/22 1449   Dressing Applied;Non-adherent;Bandaid 10/04/22 1449         Assessment:     Today comes in for evaluation of her nonhealing wound of her right lower leg.  Today wound is 0.4 cm x 0.3 cm with a depth of 0.1 cm.  Patient was in the hospital recently for heart valve repair.  Today this areas very small and was cleaned with antimicrobial soap.  Adaptic was applied and then Band-Aid.  Patient change this every 1-2 days.   And will return in 2 weeks for MD visit.    ICD-10-CM ICD-9-CM   1. Nonhealing ulcer of right lower leg with fat layer exposed  L97.912 707.19         Plan:   Tissue pathology and/or culture taken:  [] Yes [x] No   Sharp debridement performed:   [] Yes [x] No   Labs ordered this visit:   [] Yes [x] No   Imaging ordered this visit:   [] Yes [x] No           Orders Placed This Encounter   Procedures    Change dressing     Cleanse with soap and water,   Pat dry  Apply adaptic, cover with bandaid.  Change every 1-2 days  Return for md visit in 2 weeks.     Standing Status:   Standing     Number of Occurrences:   [id number]     Standing Expiration Date:   10/18/2022        Follow up in about 2 weeks (around 10/18/2022) for md visit .

## 2022-10-04 NOTE — PATIENT INSTRUCTIONS
Cleanse with soap and water (Thursday) Pat dry Apply adaptic to open area, cover with bandaid.  Change every other day.      Return for md visit in 2 weeks.

## 2022-10-11 ENCOUNTER — OFFICE VISIT (OUTPATIENT)
Dept: URGENT CARE | Facility: CLINIC | Age: 72
End: 2022-10-11
Payer: MEDICARE

## 2022-10-11 VITALS
HEIGHT: 62 IN | OXYGEN SATURATION: 99 % | HEART RATE: 56 BPM | RESPIRATION RATE: 20 BRPM | TEMPERATURE: 98 F | SYSTOLIC BLOOD PRESSURE: 112 MMHG | BODY MASS INDEX: 33.31 KG/M2 | WEIGHT: 181 LBS | DIASTOLIC BLOOD PRESSURE: 62 MMHG

## 2022-10-11 DIAGNOSIS — R42 DIZZINESS: ICD-10-CM

## 2022-10-11 DIAGNOSIS — N30.00 ACUTE CYSTITIS WITHOUT HEMATURIA: Primary | ICD-10-CM

## 2022-10-11 LAB
BILIRUB UR QL STRIP: NEGATIVE
GLUCOSE SERPL-MCNC: 190 MG/DL (ref 70–110)
GLUCOSE UR QL STRIP: NEGATIVE
KETONES UR QL STRIP: NEGATIVE
LEUKOCYTE ESTERASE UR QL STRIP: POSITIVE
PH, POC UA: 6.5
POC BLOOD, URINE: NEGATIVE
POC NITRATES, URINE: NEGATIVE
PROT UR QL STRIP: NEGATIVE
SP GR UR STRIP: 1.01 (ref 1–1.03)
UROBILINOGEN UR STRIP-ACNC: 2 (ref 0.1–1.1)

## 2022-10-11 PROCEDURE — 81003 URINALYSIS AUTO W/O SCOPE: CPT | Mod: QW,,, | Performed by: FAMILY MEDICINE

## 2022-10-11 PROCEDURE — 87088 URINE BACTERIA CULTURE: CPT | Performed by: FAMILY MEDICINE

## 2022-10-11 PROCEDURE — 82962 GLUCOSE BLOOD TEST: CPT | Mod: ,,, | Performed by: FAMILY MEDICINE

## 2022-10-11 PROCEDURE — 99213 OFFICE O/P EST LOW 20 MIN: CPT | Mod: ,,, | Performed by: FAMILY MEDICINE

## 2022-10-11 PROCEDURE — 81003 POCT URINALYSIS, DIPSTICK, AUTOMATED, W/O SCOPE: ICD-10-PCS | Mod: QW,,, | Performed by: FAMILY MEDICINE

## 2022-10-11 PROCEDURE — 82962 POCT GLUCOSE, HAND-HELD DEVICE: ICD-10-PCS | Mod: ,,, | Performed by: FAMILY MEDICINE

## 2022-10-11 PROCEDURE — 99213 PR OFFICE/OUTPT VISIT, EST, LEVL III, 20-29 MIN: ICD-10-PCS | Mod: ,,, | Performed by: FAMILY MEDICINE

## 2022-10-11 RX ORDER — SULFAMETHOXAZOLE AND TRIMETHOPRIM 400; 80 MG/1; MG/1
1 TABLET ORAL 2 TIMES DAILY
Qty: 10 TABLET | Refills: 0 | Status: SHIPPED | OUTPATIENT
Start: 2022-10-11 | End: 2022-10-16

## 2022-10-11 NOTE — PROGRESS NOTES
"Subjective:       Patient ID: Rahel Bagley is a 72 y.o. female.    Vitals:  height is 5' 2" (1.575 m) and weight is 82.1 kg (181 lb). Her oral temperature is 98.4 °F (36.9 °C). Her blood pressure is 112/62 and her pulse is 56 (abnormal). Her respiration is 20 and oxygen saturation is 99%.     Chief Complaint: Dizziness (Dizziness, blurry vision, nausea since Friday. Synthroid dose lowered on Friday. Blood sugar ranging from 60s-250s. Seen in diabetic clinic this morning and instructed to come to urgent care for evaluation. )    HPI:  72-year-old female known for multiple medical condition present to clinic with her grandson with concerns of feeling dizzy and nauseated since 5-6 days.  States blood sugars have been fluctuating from 60s to to 250s.  Patient currently on intensive IV fluid therapy with insulin for close diabetes management.  States does not take any oral diabetic medications.  Recent change in Synthroid for hypothyroidism.  Patient had procedure for mitral valve prolapse 3 weeks ago.  History of fall and hip fracture and currently dealing with nonhealing ulcer.  Appears patient was seen at her diabetic center this morning and was referred here for ongoing symptoms.  Reviewed the vital signs, no fever.  Patient states with diuretics urinary frequency is usual for her.  Denies burning with urination or hesitancy.  No history of UTI in the past.    ROS  :  Constitutional : _No fever, positive for feeling fatigue, dizziness   Neck : _Negative except HPI  Respiratory :_ No coughing, no shortness of breath  Cardiovascular : _No chest pain, no palpitations  Gastrointestinal : _No nausea, no vomiting  Genitourinary : _No flank pain, no vaginal discharge  Integumentary : _Negative for skin rash   Objective:      Physical Exam    General : Alert and Oriented, No apparent distress, afebrile, appears comfortable sitting in the exam chair, clear speech and appropriate communication  Neck - supple  Respiratory : " Bilateral equal breath sounds, nonlabored respirations, clear to auscultate   Cardiovascular : Rate rhythm regular, normal volume pulse, systolic murmur left sternal border  Gastrointestinal : Soft, mild suprapubic pressure on palpation , BS X 4 quadrants - normal  Genitourinary : No CVA tenderness Bilateral  Integumentary : Warm, Dry      Assessment:       1. Acute cystitis without hematuria    2. Dizziness          Plan:     Adequate hydration. Medications as prescribed.  Considering patient's medical history and ongoing symptoms encouraged close monitoring with family members.  Urine CultureAnalysis results will be monitored and reported, and treatment changes made if necessary.  Persistent and worsening symptoms encouraged to go to ER for further monitoring evaluation and treatment.  Patient and grandson voiced understanding  Patient also will check with primary MD and primary diabetic clinic for further management.  Fingerstick glucose in the clinic 194 random  Call or return to clinic as needed. Voiced understanding verbally.  Urine dipstick results shared, positive for large leukocytes    Acute cystitis without hematuria  -     sulfamethoxazole-trimethoprim 400-80mg (BACTRIM) 400-80 mg per tablet; Take 1 tablet by mouth 2 (two) times daily. for 5 days  Dispense: 10 tablet; Refill: 0  -     Urine Culture High Risk    Dizziness  -     POCT Glucose, Hand-Held Device  -     POCT Urinalysis, Dipstick, Automated, W/O Scope

## 2022-10-11 NOTE — PATIENT INSTRUCTIONS
Adequate hydration. Medications as prescribed.  Considering patient's medical history and ongoing symptoms encouraged close monitoring with family members.  Urine CultureAnalysis results will be monitored and reported, and treatment changes made if necessary.  Persistent and worsening symptoms encouraged to go to ER for further monitoring evaluation and treatment.  Patient and grandson voiced understanding  Patient also will check with primary MD and primary diabetic clinic for further management.  Fingerstick glucose in the clinic 194 random  Call or return to clinic as needed. Voiced understanding verbally.  Urine dipstick results shared, positive for large leukocytes

## 2022-10-13 LAB — BACTERIA UR CULT: NORMAL

## 2022-10-13 NOTE — PROGRESS NOTES
Please notify pt that final urine negative, stop bactrim.  Follow up with primary MD about blood sugars.

## 2022-10-14 ENCOUNTER — TELEPHONE (OUTPATIENT)
Dept: URGENT CARE | Facility: CLINIC | Age: 72
End: 2022-10-14
Payer: MEDICARE

## 2022-10-14 NOTE — TELEPHONE ENCOUNTER
----- Message from Elena Thao MD sent at 10/13/2022  2:02 PM CDT -----  Please notify pt that final urine negative, stop bactrim.  Follow up with primary MD about blood sugars.

## 2022-10-15 ENCOUNTER — TELEPHONE (OUTPATIENT)
Dept: URGENT CARE | Facility: CLINIC | Age: 72
End: 2022-10-15
Payer: MEDICARE

## 2022-10-15 NOTE — TELEPHONE ENCOUNTER
2nd attempt to contact pt no answer left vm     ----- Message from Elena Thao MD sent at 10/13/2022  2:02 PM CDT -----  Please notify pt that final urine negative, stop bactrim.  Follow up with primary MD about blood sugars.

## 2022-10-16 ENCOUNTER — TELEPHONE (OUTPATIENT)
Dept: URGENT CARE | Facility: CLINIC | Age: 72
End: 2022-10-16
Payer: MEDICARE

## 2022-10-16 NOTE — TELEPHONE ENCOUNTER
3rd attempt to contact pt - no answer left vm       ----- Message from Elena Thao MD sent at 10/13/2022  2:02 PM CDT -----  Please notify pt that final urine negative, stop bactrim.  Follow up with primary MD about blood sugars.

## 2022-10-20 ENCOUNTER — HOSPITAL ENCOUNTER (OUTPATIENT)
Dept: WOUND CARE | Facility: HOSPITAL | Age: 72
Discharge: HOME | End: 2022-10-20
Attending: FAMILY MEDICINE
Payer: MEDICARE

## 2022-10-20 VITALS
OXYGEN SATURATION: 96 % | DIASTOLIC BLOOD PRESSURE: 65 MMHG | HEART RATE: 64 BPM | SYSTOLIC BLOOD PRESSURE: 147 MMHG | TEMPERATURE: 99 F | RESPIRATION RATE: 20 BRPM

## 2022-10-20 DIAGNOSIS — L97.912 NONHEALING ULCER OF RIGHT LOWER LEG WITH FAT LAYER EXPOSED: Primary | ICD-10-CM

## 2022-10-20 DIAGNOSIS — N18.4 CHRONIC KIDNEY DISEASE, STAGE 4 (SEVERE): ICD-10-CM

## 2022-10-20 PROCEDURE — 87070 CULTURE OTHR SPECIMN AEROBIC: CPT

## 2022-10-20 PROCEDURE — 99213 OFFICE O/P EST LOW 20 MIN: CPT | Mod: ,,, | Performed by: PEDIATRICS

## 2022-10-20 PROCEDURE — 99213 PR OFFICE/OUTPT VISIT, EST, LEVL III, 20-29 MIN: ICD-10-PCS | Mod: ,,, | Performed by: PEDIATRICS

## 2022-10-20 PROCEDURE — 99213 OFFICE O/P EST LOW 20 MIN: CPT

## 2022-10-20 NOTE — PROGRESS NOTES
Subjective:       Patient ID: Rahel Bagley is a 72 y.o. female.    Chief Complaint: Non-healing Wound Follow Up (Right lower leg ulcer.  Re-evaluation and treat per MD)    HPI  Review of Systems      Objective:      Temp:  [98.8 °F (37.1 °C)]   Pulse:  [64]   Resp:  [20]   BP: (147)/(65)   SpO2:  [96 %]   Physical Exam       Wound 05/24/22 1624 Non pressure chronic ulcer Right anterior;lower Leg (Active)   05/24/22 1624    Pre-existing: Yes   Primary Wound Type: Non pressure   Side: Right   Orientation: anterior;lower   Location: Leg   Wound Number:    Ankle-Brachial Index:    Pulses:    Removal Indication and Assessment:    Wound Outcome:    (Retired) Wound Type:    (Retired) Wound Length (cm):    (Retired) Wound Width (cm):    (Retired) Depth (cm):    Wound Description (Comments):    Removal Indications:    Wound Image   10/20/22 1421   Dressing Appearance Intact;Moist drainage 10/20/22 1421   Drainage Amount Small 10/20/22 1421   Drainage Characteristics/Odor Sanguineous;No odor;Bleeding controlled 10/20/22 1421   Appearance Red;Granulating 10/20/22 1421   Periwound Area Dry;Redness;Scar tissue 10/20/22 1421   Wound Edges Defined 10/20/22 1421   Wound Length (cm) 0.8 cm 10/20/22 1421   Wound Width (cm) 0.8 cm 10/20/22 1421   Wound Depth (cm) 0.1 cm 10/20/22 1421   Wound Volume (cm^3) 0.064 cm^3 10/20/22 1421   Wound Surface Area (cm^2) 0.64 cm^2 10/20/22 1421   Care Cleansed with:;Antimicrobial agent 10/20/22 1421   Dressing Applied 10/20/22 1421         Assessment:     Patient continues with her nonhealing granulating ulcer of her right shin.  This is 0.8 cm x 0.8 cm with a depth of 0.1 cm.  Patient was recently hospitalized weakness and severe anemia.  She also had low plate counts and GI bleeding.  Workup showed no reason for her bleeding.  Patient healing has stalled probably because of her anemia.  Today this area was cleaned with Vashe and a wound culture was done.  Gentamicin ointment with Endoform  to area this was covered with a bandage and patient will change this every other day.  Patient will return in 1 week for MD visit    ICD-10-CM ICD-9-CM   1. Nonhealing ulcer of right lower leg with fat layer exposed  L97.912 707.19   2. Chronic kidney disease, stage 4 (severe)  N18.4 585.4         Plan:   Tissue pathology and/or culture taken:  [x] Yes [] No   Sharp debridement performed:   [] Yes [x] No   Labs ordered this visit:   [] Yes [x] No   Imaging ordered this visit:   [] Yes [x] No           Orders Placed This Encounter   Procedures    Wound Culture    Change dressing     Cleanse with soap and water  Apply gentamicin ointment then endoform to open area  Cover with bandaid.   Change every other day    A culture was obtained.  Oral antibiotics may be ordered if needed after next md visit.     Standing Status:   Standing     Number of Occurrences:   4     Standing Expiration Date:   11/20/2022        Follow up in about 1 week (around 10/27/2022) for md visit .

## 2022-10-20 NOTE — PATIENT INSTRUCTIONS
Cleanse with soap and water   Apply gentamicin ointment then endoform to open area   Cover with bandaid.   Change every other day     A culture was obtained.  Oral antibiotics may be ordered if needed after next md visit.

## 2022-10-23 LAB — BACTERIA SPEC CULT: ABNORMAL

## 2022-10-24 DIAGNOSIS — L97.912 NONHEALING ULCER OF RIGHT LOWER LEG WITH FAT LAYER EXPOSED: Primary | ICD-10-CM

## 2022-10-24 RX ORDER — SULFAMETHOXAZOLE AND TRIMETHOPRIM 800; 160 MG/1; MG/1
1 TABLET ORAL 2 TIMES DAILY
Qty: 20 TABLET | Refills: 0 | Status: SHIPPED | OUTPATIENT
Start: 2022-10-24 | End: 2022-11-03

## 2022-10-24 NOTE — PROGRESS NOTES
Culture results final, MD aware. Telephone orders received. Orders for pt to continue using Gentamicin Ointment with dressing changes. Orders for Bactrim DS BID x 10days sent to pt pharmacy. Pt contacted and instructed to continue with Gentamicin ointment and to  prescription for Bactrim and to take as directed. Pt verbalized understanding.

## 2022-10-27 ENCOUNTER — HOSPITAL ENCOUNTER (OUTPATIENT)
Dept: WOUND CARE | Facility: HOSPITAL | Age: 72
Discharge: HOME | End: 2022-10-27
Attending: FAMILY MEDICINE
Payer: MEDICARE

## 2022-10-27 DIAGNOSIS — L97.912 NONHEALING ULCER OF RIGHT LOWER LEG WITH FAT LAYER EXPOSED: ICD-10-CM

## 2022-10-27 DIAGNOSIS — N18.4 CHRONIC KIDNEY DISEASE, STAGE 4 (SEVERE): ICD-10-CM

## 2022-10-27 PROCEDURE — 99213 OFFICE O/P EST LOW 20 MIN: CPT

## 2022-10-27 PROCEDURE — 99214 PR OFFICE/OUTPT VISIT, EST, LEVL IV, 30-39 MIN: ICD-10-PCS | Mod: ,,, | Performed by: FAMILY MEDICINE

## 2022-10-27 PROCEDURE — 99214 OFFICE O/P EST MOD 30 MIN: CPT | Mod: ,,, | Performed by: FAMILY MEDICINE

## 2022-10-27 NOTE — PATIENT INSTRUCTIONS
Continue applying Gentamicin ointment and bandaid or another cover dressing to allow new skin to mature. Change every other day   Continue oral antibiotic until complete

## 2022-10-27 NOTE — PROGRESS NOTES
Subjective:       Patient ID: Rahel Bagley is a 72 y.o. female.    Chief Complaint: Non-healing Wound Follow Up (RLE non-healing wound MD visit and re-assessment )    HPI  Review of Systems      Objective:         Physical Exam       Wound 05/24/22 1624 Non pressure chronic ulcer Right anterior;lower Leg (Active)   05/24/22 1624    Pre-existing: Yes   Primary Wound Type: Non pressure   Side: Right   Orientation: anterior;lower   Location: Leg   Wound Number:    Ankle-Brachial Index:    Pulses:    Removal Indication and Assessment:    Wound Outcome:    (Retired) Wound Type:    (Retired) Wound Length (cm):    (Retired) Wound Width (cm):    (Retired) Depth (cm):    Wound Description (Comments):    Removal Indications:    Wound Image   10/27/22 1334   Dressing Appearance Intact;Dried drainage 10/27/22 1334   Drainage Amount Scant 10/27/22 1334   Drainage Characteristics/Odor Serous 10/27/22 1334   Appearance Pink;Epithelialization;Closed/resurfaced 10/27/22 1334   Periwound Area Scar tissue 10/27/22 1334   Wound Edges Defined 10/27/22 1334   Wound Length (cm) 0 cm 10/27/22 1334   Wound Width (cm) 0 cm 10/27/22 1334   Wound Depth (cm) 0 cm 10/27/22 1334   Wound Volume (cm^3) 0 cm^3 10/27/22 1334   Wound Surface Area (cm^2) 0 cm^2 10/27/22 1334   Care Cleansed with:;Sterile normal saline 10/27/22 1334   Dressing Applied;Bandaid 10/27/22 1334         Assessment:     Pt examined and notes review above. Ulcer is closed. Continue gent/bandaid. Continue Bactrim until finish. RTC in 1 week.    ICD-10-CM ICD-9-CM   1. Nonhealing ulcer of right lower leg with fat layer exposed  L97.912 707.19   2. Chronic kidney disease, stage 4 (severe)  N18.4 585.4         Plan:   Tissue pathology and/or culture taken:  [] Yes [x] No   Sharp debridement performed:   [] Yes [x] No   Labs ordered this visit:   [] Yes [x] No   Imaging ordered this visit:   [] Yes [x] No           Orders Placed This Encounter   Procedures    Change dressing      RLE: Clean with normal saline or soap and water. Apply Gentamicin Ointment and bandaid or another cover dressing to allow new skin to mature.  Continue oral antibiotics until complete     Standing Status:   Standing     Number of Occurrences:   1     Standing Expiration Date:   10/27/2022        Follow up in about 1 week (around 11/3/2022) for MD Visit .

## 2022-11-03 ENCOUNTER — HOSPITAL ENCOUNTER (OUTPATIENT)
Dept: WOUND CARE | Facility: HOSPITAL | Age: 72
Discharge: HOME | End: 2022-11-03
Attending: FAMILY MEDICINE
Payer: MEDICARE

## 2022-11-03 VITALS
RESPIRATION RATE: 18 BRPM | OXYGEN SATURATION: 100 % | DIASTOLIC BLOOD PRESSURE: 47 MMHG | HEART RATE: 63 BPM | TEMPERATURE: 99 F | SYSTOLIC BLOOD PRESSURE: 122 MMHG

## 2022-11-03 DIAGNOSIS — L97.912 NONHEALING ULCER OF RIGHT LOWER LEG WITH FAT LAYER EXPOSED: ICD-10-CM

## 2022-11-03 DIAGNOSIS — N18.4 CHRONIC KIDNEY DISEASE, STAGE 4 (SEVERE): ICD-10-CM

## 2022-11-03 PROCEDURE — 99213 OFFICE O/P EST LOW 20 MIN: CPT

## 2022-11-03 PROCEDURE — 99214 OFFICE O/P EST MOD 30 MIN: CPT | Mod: ,,, | Performed by: FAMILY MEDICINE

## 2022-11-03 PROCEDURE — 99214 PR OFFICE/OUTPT VISIT, EST, LEVL IV, 30-39 MIN: ICD-10-PCS | Mod: ,,, | Performed by: FAMILY MEDICINE

## 2022-11-03 NOTE — PROGRESS NOTES
Subjective:       Patient ID: Rahel Bagley is a 72 y.o. female.    Chief Complaint: Non-healing Wound Follow Up (R anterior lower leg ulcer.  Follow up with md for re-evaluation and treatment. Bumped RLE and has new skin tear present /)    HPI  Review of Systems      Objective:      Temp:  [98.5 °F (36.9 °C)]   Pulse:  [63]   Resp:  [18]   BP: (122)/(47)   SpO2:  [100 %]   Physical Exam       Altered Skin Integrity 11/03/22 1423 Right anterior;lower;distal Leg #2 Skin Tear (Active)   11/03/22 1423   Altered Skin Integrity Present on Admission:    Side: Right   Orientation: anterior;lower;distal   Location: Leg   Wound Number: #2   Is this injury device related?:    Primary Wound Type: Skin tear   Description of Altered Skin Integrity:    Ankle-Brachial Index:    Pulses:    Removal Indication and Assessment:    Wound Outcome:    (Retired) Wound Length (cm):    (Retired) Wound Width (cm):    (Retired) Depth (cm):    Wound Description (Comments):    Removal Indications:    Wound Image   11/03/22 1424   Dressing Appearance Intact;Moist drainage 11/03/22 1424   Drainage Amount Scant 11/03/22 1424   Drainage Characteristics/Odor Serosanguineous;No odor 11/03/22 1424   Appearance Pink;Red;Ecchymotic 11/03/22 1424   Tissue loss description Partial thickness 11/03/22 1424   Periwound Area Scar tissue 11/03/22 1424   Wound Edges Defined 11/03/22 1424   Wound Length (cm) 1.1 cm 11/03/22 1424   Wound Width (cm) 1.8 cm 11/03/22 1424   Wound Depth (cm) 0.1 cm 11/03/22 1424   Wound Volume (cm^3) 0.198 cm^3 11/03/22 1424   Wound Surface Area (cm^2) 1.98 cm^2 11/03/22 1424   Care Cleansed with:;Sterile normal saline 11/03/22 1424   Dressing Applied;Bandaid;Other (comment) 11/03/22 1424   Periwound Care Skin barrier film applied 11/03/22 1424            (Retired) Wound 05/24/22 1624 Non pressure chronic ulcer Right anterior;lower;proximal Leg #1 (Active)   05/24/22 8091    Pre-existing: Yes   Primary Wound Type: Non pressure    Side: Right   Orientation: anterior;lower;proximal   Location: Leg   Wound Number: #1   Ankle-Brachial Index:    Pulses:    Removal Indication and Assessment:    Wound Outcome:    (Retired) Wound Type:    (Retired) Wound Length (cm):    (Retired) Wound Width (cm):    (Retired) Depth (cm):    Wound Description (Comments):    Removal Indications:    Wound Image   11/03/22 1424   Dressing Appearance No dressing 11/03/22 1424   Drainage Amount None 11/03/22 1424   Appearance Closed/resurfaced;Epithelialization 11/03/22 1424   Periwound Area Scar tissue 11/03/22 1424   Wound Edges Defined 11/03/22 1424   Wound Length (cm) 0 cm 11/03/22 1424   Wound Width (cm) 0 cm 11/03/22 1424   Wound Depth (cm) 0 cm 11/03/22 1424   Wound Volume (cm^3) 0 cm^3 11/03/22 1424   Wound Surface Area (cm^2) 0 cm^2 11/03/22 1424   Care Cleansed with:;Sterile normal saline 11/03/22 1424         Assessment:     Pt examined and notes review above. Pt bump her leg and now she has a new skin tear in the R lower leg, really superficial. Plans is to apply collagen and bandaid. RTC in 2 weeks. The other ulcer is healed.    ICD-10-CM ICD-9-CM   1. Nonhealing ulcer of right lower leg with fat layer exposed  L97.912 707.19   2. Chronic kidney disease, stage 4 (severe)  N18.4 585.4         Plan:   Tissue pathology and/or culture taken:  [] Yes [x] No   Sharp debridement performed:   [] Yes [x] No   Labs ordered this visit:   [] Yes [x] No   Imaging ordered this visit:   [] Yes [x] No           Orders Placed This Encounter   Procedures    Change dressing     Cleanse with soap and water. Apply Puracol (collagen) to open area and cover with bandaid. Change every other day.     Standing Status:   Standing     Number of Occurrences:   1     Standing Expiration Date:   11/3/2022        Follow up in about 2 weeks (around 11/17/2022) for MD Visit .

## 2022-11-03 NOTE — PATIENT INSTRUCTIONS
Your old wound is healed. Clean new wound to R leg with normal saline or soap and water. Apply Puracol (collagen) to open area and cover with bandaid. Change every other day.   Return for MD visit in 2 weeks.

## 2022-11-04 ENCOUNTER — LAB VISIT (OUTPATIENT)
Dept: LAB | Facility: HOSPITAL | Age: 72
End: 2022-11-04
Attending: INTERNAL MEDICINE
Payer: MEDICARE

## 2022-11-04 DIAGNOSIS — I50.9 HEART FAILURE, UNSPECIFIED HF CHRONICITY, UNSPECIFIED HEART FAILURE TYPE: Primary | ICD-10-CM

## 2022-11-04 LAB
ANION GAP SERPL CALC-SCNC: 13 MEQ/L
BUN SERPL-MCNC: 44.1 MG/DL (ref 9.8–20.1)
CALCIUM SERPL-MCNC: 9.6 MG/DL (ref 8.4–10.2)
CHLORIDE SERPL-SCNC: 102 MMOL/L (ref 98–107)
CO2 SERPL-SCNC: 28 MMOL/L (ref 23–31)
CREAT SERPL-MCNC: 2.86 MG/DL (ref 0.55–1.02)
CREAT/UREA NIT SERPL: 15
GFR SERPLBLD CREATININE-BSD FMLA CKD-EPI: 17 MLS/MIN/1.73/M2
GLUCOSE SERPL-MCNC: 155 MG/DL (ref 82–115)
POTASSIUM SERPL-SCNC: 3.8 MMOL/L (ref 3.5–5.1)
SODIUM SERPL-SCNC: 143 MMOL/L (ref 136–145)

## 2022-11-04 PROCEDURE — 36415 COLL VENOUS BLD VENIPUNCTURE: CPT

## 2022-11-04 PROCEDURE — 80048 BASIC METABOLIC PNL TOTAL CA: CPT

## 2022-11-08 ENCOUNTER — LAB VISIT (OUTPATIENT)
Dept: LAB | Facility: HOSPITAL | Age: 72
End: 2022-11-08
Attending: INTERNAL MEDICINE
Payer: MEDICARE

## 2022-11-08 DIAGNOSIS — I12.9 PARENCHYMAL RENAL HYPERTENSION: ICD-10-CM

## 2022-11-08 DIAGNOSIS — I48.0 PAROXYSMAL ATRIAL FIBRILLATION: ICD-10-CM

## 2022-11-08 DIAGNOSIS — N18.4 CHRONIC KIDNEY DISEASE, STAGE IV (SEVERE): Primary | ICD-10-CM

## 2022-11-08 LAB
ALBUMIN SERPL-MCNC: 3.3 GM/DL (ref 3.4–4.8)
ALBUMIN/GLOB SERPL: 1.1 RATIO (ref 1.1–2)
ALP SERPL-CCNC: 55 UNIT/L (ref 40–150)
ALT SERPL-CCNC: 12 UNIT/L (ref 0–55)
AST SERPL-CCNC: 22 UNIT/L (ref 5–34)
BASOPHILS # BLD AUTO: 0.03 X10(3)/MCL (ref 0–0.2)
BASOPHILS NFR BLD AUTO: 1.1 %
BILIRUBIN DIRECT+TOT PNL SERPL-MCNC: 0.6 MG/DL
BUN SERPL-MCNC: 38.5 MG/DL (ref 9.8–20.1)
CALCIUM SERPL-MCNC: 9.4 MG/DL (ref 8.4–10.2)
CHLORIDE SERPL-SCNC: 104 MMOL/L (ref 98–107)
CO2 SERPL-SCNC: 28 MMOL/L (ref 23–31)
CREAT SERPL-MCNC: 2.29 MG/DL (ref 0.55–1.02)
EOSINOPHIL # BLD AUTO: 0.11 X10(3)/MCL (ref 0–0.9)
EOSINOPHIL NFR BLD AUTO: 4.2 %
ERYTHROCYTE [DISTWIDTH] IN BLOOD BY AUTOMATED COUNT: 14.2 % (ref 11.5–17)
GFR SERPLBLD CREATININE-BSD FMLA CKD-EPI: 22 MLS/MIN/1.73/M2
GLOBULIN SER-MCNC: 2.9 GM/DL (ref 2.4–3.5)
GLUCOSE SERPL-MCNC: 193 MG/DL (ref 82–115)
HCT VFR BLD AUTO: 29.8 % (ref 37–47)
HGB BLD-MCNC: 9.2 GM/DL (ref 12–16)
IMM GRANULOCYTES # BLD AUTO: 0.01 X10(3)/MCL (ref 0–0.04)
IMM GRANULOCYTES NFR BLD AUTO: 0.4 %
LYMPHOCYTES # BLD AUTO: 0.63 X10(3)/MCL (ref 0.6–4.6)
LYMPHOCYTES NFR BLD AUTO: 24.1 %
MCH RBC QN AUTO: 28.9 PG (ref 27–31)
MCHC RBC AUTO-ENTMCNC: 30.9 MG/DL (ref 33–36)
MCV RBC AUTO: 93.7 FL (ref 80–94)
MONOCYTES # BLD AUTO: 0.26 X10(3)/MCL (ref 0.1–1.3)
MONOCYTES NFR BLD AUTO: 10 %
NEUTROPHILS # BLD AUTO: 1.6 X10(3)/MCL (ref 2.1–9.2)
NEUTROPHILS NFR BLD AUTO: 60.2 %
PLATELET # BLD AUTO: 210 X10(3)/MCL (ref 130–400)
PMV BLD AUTO: 10.8 FL (ref 7.4–10.4)
POTASSIUM SERPL-SCNC: 3.8 MMOL/L (ref 3.5–5.1)
PROT SERPL-MCNC: 6.2 GM/DL (ref 5.8–7.6)
RBC # BLD AUTO: 3.18 X10(6)/MCL (ref 4.2–5.4)
SODIUM SERPL-SCNC: 142 MMOL/L (ref 136–145)
WBC # SPEC AUTO: 2.6 X10(3)/MCL (ref 4.5–11.5)

## 2022-11-08 PROCEDURE — 80053 COMPREHEN METABOLIC PANEL: CPT

## 2022-11-08 PROCEDURE — 36415 COLL VENOUS BLD VENIPUNCTURE: CPT

## 2022-11-08 PROCEDURE — 85025 COMPLETE CBC W/AUTO DIFF WBC: CPT

## 2022-11-16 DIAGNOSIS — N18.4 CHRONIC KIDNEY DISEASE, STAGE 4 (SEVERE): ICD-10-CM

## 2022-11-16 DIAGNOSIS — N18.4 ANEMIA OF CHRONIC KIDNEY FAILURE, STAGE 4 (SEVERE): ICD-10-CM

## 2022-11-16 DIAGNOSIS — D63.1 ANEMIA OF CHRONIC KIDNEY FAILURE, STAGE 4 (SEVERE): ICD-10-CM

## 2022-11-17 ENCOUNTER — INFUSION (OUTPATIENT)
Dept: INFUSION THERAPY | Facility: HOSPITAL | Age: 72
End: 2022-11-17
Attending: INTERNAL MEDICINE
Payer: MEDICARE

## 2022-11-17 VITALS
HEART RATE: 57 BPM | TEMPERATURE: 98 F | OXYGEN SATURATION: 96 % | RESPIRATION RATE: 18 BRPM | BODY MASS INDEX: 33.31 KG/M2 | DIASTOLIC BLOOD PRESSURE: 72 MMHG | SYSTOLIC BLOOD PRESSURE: 139 MMHG | HEIGHT: 62 IN | WEIGHT: 181 LBS

## 2022-11-17 DIAGNOSIS — N18.4 ANEMIA OF CHRONIC KIDNEY FAILURE, STAGE 4 (SEVERE): Primary | ICD-10-CM

## 2022-11-17 DIAGNOSIS — N18.4 CHRONIC KIDNEY DISEASE, STAGE 4 (SEVERE): ICD-10-CM

## 2022-11-17 DIAGNOSIS — D63.1 ANEMIA OF CHRONIC KIDNEY FAILURE, STAGE 4 (SEVERE): Primary | ICD-10-CM

## 2022-11-17 LAB
FERRITIN SERPL-MCNC: 310.8 NG/ML (ref 4.63–204)
HCT VFR BLD AUTO: 31.4 % (ref 37–47)
HGB BLD-MCNC: 9.6 GM/DL (ref 12–16)
HGB, POC: 9.7 G/DL (ref 12–16)
IRON SATN MFR SERPL: 24 % (ref 20–50)
IRON SERPL-MCNC: 70 UG/DL (ref 50–170)
TIBC SERPL-MCNC: 223 UG/DL (ref 70–310)
TIBC SERPL-MCNC: 293 UG/DL (ref 250–450)
TRANSFERRIN SERPL-MCNC: 266 MG/DL (ref 173–360)

## 2022-11-17 PROCEDURE — 63600175 PHARM REV CODE 636 W HCPCS: Mod: JG,EC | Performed by: INTERNAL MEDICINE

## 2022-11-17 PROCEDURE — 85018 HEMOGLOBIN: CPT | Performed by: INTERNAL MEDICINE

## 2022-11-17 PROCEDURE — 82728 ASSAY OF FERRITIN: CPT | Performed by: INTERNAL MEDICINE

## 2022-11-17 PROCEDURE — 96372 THER/PROPH/DIAG INJ SC/IM: CPT

## 2022-11-17 PROCEDURE — 85014 HEMATOCRIT: CPT | Performed by: INTERNAL MEDICINE

## 2022-11-17 PROCEDURE — 36415 COLL VENOUS BLD VENIPUNCTURE: CPT | Performed by: INTERNAL MEDICINE

## 2022-11-17 RX ADMIN — EPOETIN ALFA-EPBX 10000 UNITS: 10000 INJECTION, SOLUTION INTRAVENOUS; SUBCUTANEOUS at 10:11

## 2022-11-18 ENCOUNTER — LAB VISIT (OUTPATIENT)
Dept: LAB | Facility: HOSPITAL | Age: 72
End: 2022-11-18
Payer: MEDICARE

## 2022-11-18 DIAGNOSIS — E11.21 DIABETIC GLOMERULOPATHY: ICD-10-CM

## 2022-11-18 DIAGNOSIS — E11.00 TYPE II DIABETES MELLITUS WITH HYPEROSMOLARITY, UNCONTROLLED: ICD-10-CM

## 2022-11-18 DIAGNOSIS — E78.2 MIXED HYPERLIPIDEMIA: Primary | ICD-10-CM

## 2022-11-18 DIAGNOSIS — E11.42 DIABETIC POLYNEUROPATHY: ICD-10-CM

## 2022-11-18 LAB
ALBUMIN SERPL-MCNC: 3.6 GM/DL (ref 3.4–4.8)
ALBUMIN/GLOB SERPL: 0.9 RATIO (ref 1.1–2)
ALP SERPL-CCNC: 69 UNIT/L (ref 40–150)
ALT SERPL-CCNC: 12 UNIT/L (ref 0–55)
APPEARANCE UR: CLEAR
AST SERPL-CCNC: 22 UNIT/L (ref 5–34)
BACTERIA #/AREA URNS AUTO: ABNORMAL /HPF
BILIRUB UR QL STRIP.AUTO: NEGATIVE MG/DL
BILIRUBIN DIRECT+TOT PNL SERPL-MCNC: 0.8 MG/DL
BUN SERPL-MCNC: 44.8 MG/DL (ref 9.8–20.1)
CALCIUM SERPL-MCNC: 10.4 MG/DL (ref 8.4–10.2)
CHLORIDE SERPL-SCNC: 97 MMOL/L (ref 98–107)
CHOLEST SERPL-MCNC: 116 MG/DL
CHOLEST/HDLC SERPL: 2 {RATIO} (ref 0–5)
CO2 SERPL-SCNC: 32 MMOL/L (ref 23–31)
COLOR UR AUTO: YELLOW
CREAT SERPL-MCNC: 2.05 MG/DL (ref 0.55–1.02)
CREAT UR-MCNC: 54.8 MG/DL (ref 47–110)
CRP SERPL HS-MCNC: 13 MG/L
ERYTHROCYTE [DISTWIDTH] IN BLOOD BY AUTOMATED COUNT: 14.1 % (ref 11.5–17)
EST. AVERAGE GLUCOSE BLD GHB EST-MCNC: 122.6 MG/DL
GFR SERPLBLD CREATININE-BSD FMLA CKD-EPI: 25 MLS/MIN/1.73/M2
GLOBULIN SER-MCNC: 3.9 GM/DL (ref 2.4–3.5)
GLUCOSE SERPL-MCNC: 139 MG/DL (ref 82–115)
GLUCOSE UR QL STRIP.AUTO: NEGATIVE MG/DL
HBA1C MFR BLD: 5.9 %
HCT VFR BLD AUTO: 36.6 % (ref 37–47)
HDLC SERPL-MCNC: 55 MG/DL (ref 35–60)
HGB BLD-MCNC: 10.5 GM/DL (ref 12–16)
KETONES UR QL STRIP.AUTO: NEGATIVE MG/DL
LDLC SERPL CALC-MCNC: 48 MG/DL (ref 50–140)
LEUKOCYTE ESTERASE UR QL STRIP.AUTO: ABNORMAL UNIT/L
MCH RBC QN AUTO: 28.6 PG (ref 27–31)
MCHC RBC AUTO-ENTMCNC: 28.7 MG/DL (ref 33–36)
MCV RBC AUTO: 99.7 FL (ref 80–94)
MICROALBUMIN UR-MCNC: 21.9 UG/ML
MICROALBUMIN/CREAT RATIO PNL UR: 40 MG/GM CR (ref 0–30)
NITRITE UR QL STRIP.AUTO: NEGATIVE
PH UR STRIP.AUTO: 7.5 [PH]
PLATELET # BLD AUTO: 261 X10(3)/MCL (ref 130–400)
PMV BLD AUTO: 10.5 FL (ref 7.4–10.4)
POTASSIUM SERPL-SCNC: 3.6 MMOL/L (ref 3.5–5.1)
PROT SERPL-MCNC: 7.5 GM/DL (ref 5.8–7.6)
PROT UR QL STRIP.AUTO: NEGATIVE MG/DL
RBC # BLD AUTO: 3.67 X10(6)/MCL (ref 4.2–5.4)
RBC #/AREA URNS AUTO: ABNORMAL /HPF
RBC UR QL AUTO: NEGATIVE UNIT/L
SODIUM SERPL-SCNC: 141 MMOL/L (ref 136–145)
SP GR UR STRIP.AUTO: 1.02
SQUAMOUS #/AREA URNS AUTO: ABNORMAL /HPF
TRIGL SERPL-MCNC: 64 MG/DL (ref 37–140)
UROBILINOGEN UR STRIP-ACNC: 0.2 MG/DL
VLDLC SERPL CALC-MCNC: 13 MG/DL
WBC # SPEC AUTO: 3.8 X10(3)/MCL (ref 4.5–11.5)
WBC #/AREA URNS AUTO: ABNORMAL /HPF

## 2022-11-18 PROCEDURE — 84681 ASSAY OF C-PEPTIDE: CPT

## 2022-11-18 PROCEDURE — 80061 LIPID PANEL: CPT

## 2022-11-18 PROCEDURE — 85027 COMPLETE CBC AUTOMATED: CPT

## 2022-11-18 PROCEDURE — 83036 HEMOGLOBIN GLYCOSYLATED A1C: CPT

## 2022-11-18 PROCEDURE — 81001 URINALYSIS AUTO W/SCOPE: CPT

## 2022-11-18 PROCEDURE — 86141 C-REACTIVE PROTEIN HS: CPT

## 2022-11-18 PROCEDURE — 36415 COLL VENOUS BLD VENIPUNCTURE: CPT

## 2022-11-18 PROCEDURE — 80053 COMPREHEN METABOLIC PANEL: CPT

## 2022-11-18 PROCEDURE — 82043 UR ALBUMIN QUANTITATIVE: CPT

## 2022-11-21 LAB — C PEPTIDE P FAST SERPL-MCNC: 3.5 NG/ML (ref 1.1–4.4)

## 2022-12-01 ENCOUNTER — INFUSION (OUTPATIENT)
Dept: INFUSION THERAPY | Facility: HOSPITAL | Age: 72
End: 2022-12-01
Attending: INTERNAL MEDICINE
Payer: MEDICARE

## 2022-12-01 VITALS
RESPIRATION RATE: 18 BRPM | HEART RATE: 55 BPM | BODY MASS INDEX: 33.31 KG/M2 | OXYGEN SATURATION: 97 % | TEMPERATURE: 98 F | WEIGHT: 181 LBS | HEIGHT: 62 IN | DIASTOLIC BLOOD PRESSURE: 65 MMHG | SYSTOLIC BLOOD PRESSURE: 138 MMHG

## 2022-12-01 DIAGNOSIS — N18.4 ANEMIA OF CHRONIC KIDNEY FAILURE, STAGE 4 (SEVERE): Primary | ICD-10-CM

## 2022-12-01 DIAGNOSIS — N18.4 CHRONIC KIDNEY DISEASE, STAGE 4 (SEVERE): ICD-10-CM

## 2022-12-01 DIAGNOSIS — D63.1 ANEMIA OF CHRONIC KIDNEY FAILURE, STAGE 4 (SEVERE): Primary | ICD-10-CM

## 2022-12-01 LAB
HCT VFR BLD AUTO: 32.7 % (ref 37–47)
HGB BLD-MCNC: 10.3 GM/DL (ref 12–16)
HGB, POC: 9.9 G/DL (ref 12–16)

## 2022-12-01 PROCEDURE — 63600175 PHARM REV CODE 636 W HCPCS: Mod: JG | Performed by: INTERNAL MEDICINE

## 2022-12-01 PROCEDURE — 85014 HEMATOCRIT: CPT | Performed by: INTERNAL MEDICINE

## 2022-12-01 PROCEDURE — 36415 COLL VENOUS BLD VENIPUNCTURE: CPT | Performed by: INTERNAL MEDICINE

## 2022-12-01 PROCEDURE — 36415 COLL VENOUS BLD VENIPUNCTURE: CPT

## 2022-12-01 PROCEDURE — 96372 THER/PROPH/DIAG INJ SC/IM: CPT

## 2022-12-01 RX ADMIN — EPOETIN ALFA-EPBX 10000 UNITS: 10000 INJECTION, SOLUTION INTRAVENOUS; SUBCUTANEOUS at 10:12

## 2022-12-07 ENCOUNTER — LAB VISIT (OUTPATIENT)
Dept: LAB | Facility: HOSPITAL | Age: 72
End: 2022-12-07
Attending: INTERNAL MEDICINE
Payer: MEDICARE

## 2022-12-07 DIAGNOSIS — E87.5 HYPERPOTASSEMIA: ICD-10-CM

## 2022-12-07 DIAGNOSIS — N18.4 CHRONIC KIDNEY DISEASE, STAGE IV (SEVERE): ICD-10-CM

## 2022-12-07 DIAGNOSIS — E55.9 AVITAMINOSIS D: ICD-10-CM

## 2022-12-07 DIAGNOSIS — E21.3 HYPERPARATHYROIDISM, UNSPECIFIED: ICD-10-CM

## 2022-12-07 DIAGNOSIS — I50.42 CHRONIC COMBINED SYSTOLIC AND DIASTOLIC HEART FAILURE: ICD-10-CM

## 2022-12-07 DIAGNOSIS — N18.6 TYPE 2 DIABETES MELLITUS WITH END-STAGE RENAL DISEASE: Primary | ICD-10-CM

## 2022-12-07 DIAGNOSIS — D63.1 ANEMIA OF CHRONIC RENAL FAILURE, UNSPECIFIED CKD STAGE: ICD-10-CM

## 2022-12-07 DIAGNOSIS — E11.22 TYPE 2 DIABETES MELLITUS WITH END-STAGE RENAL DISEASE: Primary | ICD-10-CM

## 2022-12-07 DIAGNOSIS — N18.9 ANEMIA OF CHRONIC RENAL FAILURE, UNSPECIFIED CKD STAGE: ICD-10-CM

## 2022-12-07 DIAGNOSIS — I12.9 PARENCHYMAL RENAL HYPERTENSION: ICD-10-CM

## 2022-12-07 LAB
ALBUMIN SERPL-MCNC: 3.4 GM/DL (ref 3.4–4.8)
ALBUMIN/GLOB SERPL: 0.9 RATIO (ref 1.1–2)
ALP SERPL-CCNC: 81 UNIT/L (ref 40–150)
ALT SERPL-CCNC: 10 UNIT/L (ref 0–55)
AST SERPL-CCNC: 19 UNIT/L (ref 5–34)
BASOPHILS # BLD AUTO: 0.05 X10(3)/MCL (ref 0–0.2)
BASOPHILS NFR BLD AUTO: 1.1 %
BILIRUBIN DIRECT+TOT PNL SERPL-MCNC: 0.7 MG/DL
BUN SERPL-MCNC: 54.7 MG/DL (ref 9.8–20.1)
CALCIUM SERPL-MCNC: 10.2 MG/DL (ref 8.4–10.2)
CHLORIDE SERPL-SCNC: 95 MMOL/L (ref 98–107)
CO2 SERPL-SCNC: 31 MMOL/L (ref 23–31)
CREAT SERPL-MCNC: 1.71 MG/DL (ref 0.55–1.02)
DEPRECATED CALCIDIOL+CALCIFEROL SERPL-MC: 33.5 NG/ML (ref 30–80)
EOSINOPHIL # BLD AUTO: 0.16 X10(3)/MCL (ref 0–0.9)
EOSINOPHIL NFR BLD AUTO: 3.5 %
ERYTHROCYTE [DISTWIDTH] IN BLOOD BY AUTOMATED COUNT: 13.2 % (ref 11.5–17)
FERRITIN SERPL-MCNC: 314.9 NG/ML (ref 4.63–204)
GFR SERPLBLD CREATININE-BSD FMLA CKD-EPI: 32 MLS/MIN/1.73/M2
GLOBULIN SER-MCNC: 3.7 GM/DL (ref 2.4–3.5)
GLUCOSE SERPL-MCNC: 143 MG/DL (ref 82–115)
HCT VFR BLD AUTO: 33.3 % (ref 37–47)
HGB BLD-MCNC: 10.4 GM/DL (ref 12–16)
IMM GRANULOCYTES # BLD AUTO: 0 X10(3)/MCL (ref 0–0.04)
IMM GRANULOCYTES NFR BLD AUTO: 0 %
IRON SATN MFR SERPL: 17 % (ref 20–50)
IRON SERPL-MCNC: 50 UG/DL (ref 50–170)
LYMPHOCYTES # BLD AUTO: 0.95 X10(3)/MCL (ref 0.6–4.6)
LYMPHOCYTES NFR BLD AUTO: 20.9 %
MAGNESIUM SERPL-MCNC: 2.4 MG/DL (ref 1.6–2.6)
MCH RBC QN AUTO: 28.3 PG (ref 27–31)
MCHC RBC AUTO-ENTMCNC: 31.2 MG/DL (ref 33–36)
MCV RBC AUTO: 90.7 FL (ref 80–94)
MONOCYTES # BLD AUTO: 0.46 X10(3)/MCL (ref 0.1–1.3)
MONOCYTES NFR BLD AUTO: 10.1 %
NEUTROPHILS # BLD AUTO: 2.9 X10(3)/MCL (ref 2.1–9.2)
NEUTROPHILS NFR BLD AUTO: 64.4 %
PHOSPHATE SERPL-MCNC: 3.6 MG/DL (ref 2.3–4.7)
PLATELET # BLD AUTO: 278 X10(3)/MCL (ref 130–400)
PMV BLD AUTO: 10.4 FL (ref 7.4–10.4)
POTASSIUM SERPL-SCNC: 3.6 MMOL/L (ref 3.5–5.1)
PROT SERPL-MCNC: 7.1 GM/DL (ref 5.8–7.6)
PTH-INTACT SERPL-MCNC: 91.9 PG/ML (ref 8.7–77)
RBC # BLD AUTO: 3.67 X10(6)/MCL (ref 4.2–5.4)
SODIUM SERPL-SCNC: 136 MMOL/L (ref 136–145)
TIBC SERPL-MCNC: 236 UG/DL (ref 70–310)
TIBC SERPL-MCNC: 286 UG/DL (ref 250–450)
TRANSFERRIN SERPL-MCNC: 260 MG/DL (ref 173–360)
WBC # SPEC AUTO: 4.5 X10(3)/MCL (ref 4.5–11.5)

## 2022-12-07 PROCEDURE — 80053 COMPREHEN METABOLIC PANEL: CPT

## 2022-12-07 PROCEDURE — 84100 ASSAY OF PHOSPHORUS: CPT

## 2022-12-07 PROCEDURE — 83735 ASSAY OF MAGNESIUM: CPT

## 2022-12-07 PROCEDURE — 83540 ASSAY OF IRON: CPT

## 2022-12-07 PROCEDURE — 82306 VITAMIN D 25 HYDROXY: CPT

## 2022-12-07 PROCEDURE — 83970 ASSAY OF PARATHORMONE: CPT

## 2022-12-07 PROCEDURE — 36415 COLL VENOUS BLD VENIPUNCTURE: CPT

## 2022-12-07 PROCEDURE — 82728 ASSAY OF FERRITIN: CPT

## 2022-12-07 PROCEDURE — 85025 COMPLETE CBC W/AUTO DIFF WBC: CPT

## 2022-12-15 ENCOUNTER — INFUSION (OUTPATIENT)
Dept: INFUSION THERAPY | Facility: HOSPITAL | Age: 72
End: 2022-12-15
Attending: INTERNAL MEDICINE
Payer: MEDICARE

## 2022-12-15 VITALS
HEIGHT: 62 IN | OXYGEN SATURATION: 98 % | HEART RATE: 56 BPM | TEMPERATURE: 98 F | DIASTOLIC BLOOD PRESSURE: 58 MMHG | WEIGHT: 181 LBS | SYSTOLIC BLOOD PRESSURE: 137 MMHG | RESPIRATION RATE: 18 BRPM | BODY MASS INDEX: 33.31 KG/M2

## 2022-12-15 DIAGNOSIS — N18.4 ANEMIA OF CHRONIC KIDNEY FAILURE, STAGE 4 (SEVERE): Primary | ICD-10-CM

## 2022-12-15 DIAGNOSIS — D63.1 ANEMIA OF CHRONIC KIDNEY FAILURE, STAGE 4 (SEVERE): Primary | ICD-10-CM

## 2022-12-15 DIAGNOSIS — N18.4 CHRONIC KIDNEY DISEASE, STAGE 4 (SEVERE): ICD-10-CM

## 2022-12-15 LAB
HCT VFR BLD AUTO: 33 % (ref 37–47)
HGB BLD-MCNC: 10.4 GM/DL (ref 12–16)
HGB, POC: 10.4 G/DL (ref 12–16)

## 2022-12-15 PROCEDURE — 63600175 PHARM REV CODE 636 W HCPCS: Mod: JG | Performed by: INTERNAL MEDICINE

## 2022-12-15 PROCEDURE — 85014 HEMATOCRIT: CPT | Performed by: INTERNAL MEDICINE

## 2022-12-15 PROCEDURE — 96372 THER/PROPH/DIAG INJ SC/IM: CPT

## 2022-12-15 PROCEDURE — 36415 COLL VENOUS BLD VENIPUNCTURE: CPT

## 2022-12-15 PROCEDURE — 36415 COLL VENOUS BLD VENIPUNCTURE: CPT | Performed by: INTERNAL MEDICINE

## 2022-12-15 RX ADMIN — EPOETIN ALFA-EPBX 10000 UNITS: 10000 INJECTION, SOLUTION INTRAVENOUS; SUBCUTANEOUS at 10:12

## 2022-12-29 ENCOUNTER — INFUSION (OUTPATIENT)
Dept: INFUSION THERAPY | Facility: HOSPITAL | Age: 72
End: 2022-12-29
Attending: INTERNAL MEDICINE
Payer: MEDICARE

## 2022-12-29 VITALS
HEIGHT: 62 IN | WEIGHT: 181 LBS | RESPIRATION RATE: 18 BRPM | SYSTOLIC BLOOD PRESSURE: 125 MMHG | HEART RATE: 57 BPM | BODY MASS INDEX: 33.31 KG/M2 | TEMPERATURE: 98 F | DIASTOLIC BLOOD PRESSURE: 57 MMHG | OXYGEN SATURATION: 97 %

## 2022-12-29 DIAGNOSIS — D63.1 ANEMIA OF CHRONIC KIDNEY FAILURE, STAGE 4 (SEVERE): Primary | ICD-10-CM

## 2022-12-29 DIAGNOSIS — N18.4 CHRONIC KIDNEY DISEASE, STAGE 4 (SEVERE): ICD-10-CM

## 2022-12-29 DIAGNOSIS — N18.4 ANEMIA OF CHRONIC KIDNEY FAILURE, STAGE 4 (SEVERE): Primary | ICD-10-CM

## 2022-12-29 LAB
HCT VFR BLD AUTO: 30.9 % (ref 37–47)
HGB BLD-MCNC: 10.1 GM/DL (ref 12–16)
HGB, POC: 9.9 G/DL (ref 12–16)

## 2022-12-29 PROCEDURE — 63600175 PHARM REV CODE 636 W HCPCS: Mod: JG | Performed by: INTERNAL MEDICINE

## 2022-12-29 PROCEDURE — 96372 THER/PROPH/DIAG INJ SC/IM: CPT

## 2022-12-29 PROCEDURE — 36415 COLL VENOUS BLD VENIPUNCTURE: CPT | Performed by: INTERNAL MEDICINE

## 2022-12-29 PROCEDURE — 85018 HEMOGLOBIN: CPT | Performed by: INTERNAL MEDICINE

## 2022-12-29 PROCEDURE — 36415 COLL VENOUS BLD VENIPUNCTURE: CPT

## 2022-12-29 PROCEDURE — 85014 HEMATOCRIT: CPT | Performed by: INTERNAL MEDICINE

## 2022-12-29 RX ADMIN — EPOETIN ALFA-EPBX 10000 UNITS: 10000 INJECTION, SOLUTION INTRAVENOUS; SUBCUTANEOUS at 10:12

## 2023-01-12 ENCOUNTER — INFUSION (OUTPATIENT)
Dept: INFUSION THERAPY | Facility: HOSPITAL | Age: 73
End: 2023-01-12
Attending: INTERNAL MEDICINE
Payer: MEDICARE

## 2023-01-12 VITALS
WEIGHT: 181 LBS | HEIGHT: 62 IN | SYSTOLIC BLOOD PRESSURE: 123 MMHG | DIASTOLIC BLOOD PRESSURE: 52 MMHG | RESPIRATION RATE: 18 BRPM | BODY MASS INDEX: 33.31 KG/M2 | TEMPERATURE: 98 F | HEART RATE: 57 BPM | OXYGEN SATURATION: 97 %

## 2023-01-12 DIAGNOSIS — N18.4 ANEMIA OF CHRONIC KIDNEY FAILURE, STAGE 4 (SEVERE): Primary | ICD-10-CM

## 2023-01-12 DIAGNOSIS — D63.1 ANEMIA OF CHRONIC KIDNEY FAILURE, STAGE 4 (SEVERE): Primary | ICD-10-CM

## 2023-01-12 DIAGNOSIS — N18.4 CHRONIC KIDNEY DISEASE, STAGE 4 (SEVERE): ICD-10-CM

## 2023-01-12 LAB
HCT VFR BLD AUTO: 35 % (ref 37–47)
HGB BLD-MCNC: 10.9 GM/DL (ref 12–16)
HGB, POC: 10.6 G/DL (ref 12–16)

## 2023-01-12 PROCEDURE — 36415 COLL VENOUS BLD VENIPUNCTURE: CPT

## 2023-01-12 PROCEDURE — 85014 HEMATOCRIT: CPT | Performed by: INTERNAL MEDICINE

## 2023-01-12 PROCEDURE — 36415 COLL VENOUS BLD VENIPUNCTURE: CPT | Performed by: INTERNAL MEDICINE

## 2023-01-26 ENCOUNTER — INFUSION (OUTPATIENT)
Dept: INFUSION THERAPY | Facility: HOSPITAL | Age: 73
End: 2023-01-26
Attending: INTERNAL MEDICINE
Payer: MEDICARE

## 2023-01-26 VITALS
HEIGHT: 62 IN | RESPIRATION RATE: 18 BRPM | TEMPERATURE: 98 F | BODY MASS INDEX: 33.31 KG/M2 | WEIGHT: 181 LBS | DIASTOLIC BLOOD PRESSURE: 66 MMHG | OXYGEN SATURATION: 97 % | HEART RATE: 60 BPM | SYSTOLIC BLOOD PRESSURE: 145 MMHG

## 2023-01-26 DIAGNOSIS — N18.4 ANEMIA OF CHRONIC KIDNEY FAILURE, STAGE 4 (SEVERE): ICD-10-CM

## 2023-01-26 DIAGNOSIS — D63.1 ANEMIA OF CHRONIC KIDNEY FAILURE, STAGE 4 (SEVERE): ICD-10-CM

## 2023-01-26 DIAGNOSIS — N18.4 CHRONIC KIDNEY DISEASE, STAGE 4 (SEVERE): ICD-10-CM

## 2023-01-26 DIAGNOSIS — D64.9 ANEMIA, UNSPECIFIED TYPE: Primary | ICD-10-CM

## 2023-01-26 LAB
HCT VFR BLD AUTO: 33.3 % (ref 37–47)
HGB BLD-MCNC: 10.2 GM/DL (ref 12–16)
HGB, POC: 10.2 G/DL (ref 12–16)

## 2023-01-26 PROCEDURE — 96372 THER/PROPH/DIAG INJ SC/IM: CPT

## 2023-01-26 PROCEDURE — 36415 COLL VENOUS BLD VENIPUNCTURE: CPT

## 2023-01-26 PROCEDURE — 85014 HEMATOCRIT: CPT | Performed by: INTERNAL MEDICINE

## 2023-01-26 PROCEDURE — 63600175 PHARM REV CODE 636 W HCPCS: Mod: JG | Performed by: INTERNAL MEDICINE

## 2023-01-26 RX ADMIN — EPOETIN ALFA-EPBX 10000 UNITS: 10000 INJECTION, SOLUTION INTRAVENOUS; SUBCUTANEOUS at 11:01

## 2023-02-01 ENCOUNTER — LAB VISIT (OUTPATIENT)
Dept: LAB | Facility: HOSPITAL | Age: 73
End: 2023-02-01
Attending: INTERNAL MEDICINE
Payer: MEDICARE

## 2023-02-01 DIAGNOSIS — N18.32 CHRONIC KIDNEY DISEASE (CKD) STAGE G3B/A1, MODERATELY DECREASED GLOMERULAR FILTRATION RATE (GFR) BETWEEN 30-44 ML/MIN/1.73 SQUARE METER AND ALBUMINURIA CREATININE RATIO LESS THAN 30 MG/G: ICD-10-CM

## 2023-02-01 DIAGNOSIS — D63.1 ANEMIA OF CHRONIC RENAL FAILURE, UNSPECIFIED CKD STAGE: ICD-10-CM

## 2023-02-01 DIAGNOSIS — E87.5 HYPERPOTASSEMIA: ICD-10-CM

## 2023-02-01 DIAGNOSIS — N18.9 ANEMIA OF CHRONIC RENAL FAILURE, UNSPECIFIED CKD STAGE: ICD-10-CM

## 2023-02-01 DIAGNOSIS — N18.6 TYPE 2 DIABETES MELLITUS WITH END-STAGE RENAL DISEASE: Primary | ICD-10-CM

## 2023-02-01 DIAGNOSIS — E55.9 AVITAMINOSIS D: ICD-10-CM

## 2023-02-01 DIAGNOSIS — I12.9 PARENCHYMAL RENAL HYPERTENSION: ICD-10-CM

## 2023-02-01 DIAGNOSIS — E11.22 TYPE 2 DIABETES MELLITUS WITH END-STAGE RENAL DISEASE: Primary | ICD-10-CM

## 2023-02-01 DIAGNOSIS — E21.3 HYPERPARATHYROIDISM, UNSPECIFIED: ICD-10-CM

## 2023-02-01 DIAGNOSIS — I50.42 CHRONIC COMBINED SYSTOLIC AND DIASTOLIC HEART FAILURE: ICD-10-CM

## 2023-02-01 LAB
ALBUMIN SERPL-MCNC: 3.3 G/DL (ref 3.4–4.8)
ALBUMIN/GLOB SERPL: 0.9 RATIO (ref 1.1–2)
ALP SERPL-CCNC: 69 UNIT/L (ref 40–150)
ALT SERPL-CCNC: 11 UNIT/L (ref 0–55)
AST SERPL-CCNC: 21 UNIT/L (ref 5–34)
BASOPHILS # BLD AUTO: 0.02 X10(3)/MCL (ref 0–0.2)
BASOPHILS NFR BLD AUTO: 0.5 %
BILIRUBIN DIRECT+TOT PNL SERPL-MCNC: 1 MG/DL
BUN SERPL-MCNC: 42.1 MG/DL (ref 9.8–20.1)
CALCIUM SERPL-MCNC: 10.3 MG/DL (ref 8.4–10.2)
CHLORIDE SERPL-SCNC: 98 MMOL/L (ref 98–107)
CO2 SERPL-SCNC: 34 MMOL/L (ref 23–31)
CREAT SERPL-MCNC: 2.19 MG/DL (ref 0.55–1.02)
EOSINOPHIL # BLD AUTO: 0.17 X10(3)/MCL (ref 0–0.9)
EOSINOPHIL NFR BLD AUTO: 3.9 %
ERYTHROCYTE [DISTWIDTH] IN BLOOD BY AUTOMATED COUNT: 13.4 % (ref 11.5–17)
FERRITIN SERPL-MCNC: 341.8 NG/ML (ref 4.63–204)
GFR SERPLBLD CREATININE-BSD FMLA CKD-EPI: 23 MLS/MIN/1.73/M2
GLOBULIN SER-MCNC: 3.7 GM/DL (ref 2.4–3.5)
GLUCOSE SERPL-MCNC: 255 MG/DL (ref 82–115)
HCT VFR BLD AUTO: 32.9 % (ref 37–47)
HGB BLD-MCNC: 10.2 GM/DL (ref 12–16)
IMM GRANULOCYTES # BLD AUTO: 0.01 X10(3)/MCL (ref 0–0.04)
IMM GRANULOCYTES NFR BLD AUTO: 0.2 %
IRON SATN MFR SERPL: 29 % (ref 20–50)
IRON SERPL-MCNC: 77 UG/DL (ref 50–170)
LYMPHOCYTES # BLD AUTO: 0.82 X10(3)/MCL (ref 0.6–4.6)
LYMPHOCYTES NFR BLD AUTO: 18.7 %
MAGNESIUM SERPL-MCNC: 2.2 MG/DL (ref 1.6–2.6)
MCH RBC QN AUTO: 27.6 PG
MCHC RBC AUTO-ENTMCNC: 31 MG/DL (ref 33–36)
MCV RBC AUTO: 89.2 FL (ref 80–94)
MONOCYTES # BLD AUTO: 0.35 X10(3)/MCL (ref 0.1–1.3)
MONOCYTES NFR BLD AUTO: 8 %
NEUTROPHILS # BLD AUTO: 3.02 X10(3)/MCL (ref 2.1–9.2)
NEUTROPHILS NFR BLD AUTO: 68.7 %
PHOSPHATE SERPL-MCNC: 3.2 MG/DL (ref 2.3–4.7)
PLATELET # BLD AUTO: 253 X10(3)/MCL (ref 130–400)
PMV BLD AUTO: 10.2 FL (ref 7.4–10.4)
POTASSIUM SERPL-SCNC: 3.6 MMOL/L (ref 3.5–5.1)
PROT SERPL-MCNC: 7 GM/DL (ref 5.8–7.6)
PTH-INTACT SERPL-MCNC: 70.3 PG/ML (ref 8.7–77)
RBC # BLD AUTO: 3.69 X10(6)/MCL (ref 4.2–5.4)
SODIUM SERPL-SCNC: 144 MMOL/L (ref 136–145)
TIBC SERPL-MCNC: 193 UG/DL (ref 70–310)
TIBC SERPL-MCNC: 270 UG/DL (ref 250–450)
TRANSFERRIN SERPL-MCNC: 245 MG/DL (ref 173–360)
WBC # SPEC AUTO: 4.4 X10(3)/MCL (ref 4.5–11.5)

## 2023-02-01 PROCEDURE — 36415 COLL VENOUS BLD VENIPUNCTURE: CPT

## 2023-02-01 PROCEDURE — 83970 ASSAY OF PARATHORMONE: CPT

## 2023-02-01 PROCEDURE — 83735 ASSAY OF MAGNESIUM: CPT

## 2023-02-01 PROCEDURE — 83550 IRON BINDING TEST: CPT

## 2023-02-01 PROCEDURE — 85025 COMPLETE CBC W/AUTO DIFF WBC: CPT

## 2023-02-01 PROCEDURE — 84100 ASSAY OF PHOSPHORUS: CPT

## 2023-02-01 PROCEDURE — 82728 ASSAY OF FERRITIN: CPT

## 2023-02-01 PROCEDURE — 80053 COMPREHEN METABOLIC PANEL: CPT

## 2023-02-08 ENCOUNTER — INFUSION (OUTPATIENT)
Dept: INFUSION THERAPY | Facility: HOSPITAL | Age: 73
End: 2023-02-08
Attending: INTERNAL MEDICINE
Payer: MEDICARE

## 2023-02-08 VITALS
HEART RATE: 63 BPM | DIASTOLIC BLOOD PRESSURE: 58 MMHG | WEIGHT: 179 LBS | BODY MASS INDEX: 32.94 KG/M2 | SYSTOLIC BLOOD PRESSURE: 134 MMHG | RESPIRATION RATE: 18 BRPM | TEMPERATURE: 99 F | OXYGEN SATURATION: 97 % | HEIGHT: 62 IN

## 2023-02-08 DIAGNOSIS — N18.4 ANEMIA OF CHRONIC KIDNEY FAILURE, STAGE 4 (SEVERE): Primary | ICD-10-CM

## 2023-02-08 DIAGNOSIS — D63.1 ANEMIA OF CHRONIC KIDNEY FAILURE, STAGE 4 (SEVERE): Primary | ICD-10-CM

## 2023-02-08 DIAGNOSIS — N18.4 CHRONIC KIDNEY DISEASE, STAGE 4 (SEVERE): ICD-10-CM

## 2023-02-08 LAB
HCT VFR BLD AUTO: 32.3 % (ref 37–47)
HGB BLD-MCNC: 10.1 GM/DL (ref 12–16)
HGB, POC: 9.8 G/DL (ref 12–16)

## 2023-02-08 PROCEDURE — 96372 THER/PROPH/DIAG INJ SC/IM: CPT

## 2023-02-08 PROCEDURE — 85014 HEMATOCRIT: CPT | Performed by: INTERNAL MEDICINE

## 2023-02-08 PROCEDURE — 36415 COLL VENOUS BLD VENIPUNCTURE: CPT

## 2023-02-08 PROCEDURE — 63600175 PHARM REV CODE 636 W HCPCS: Mod: JG | Performed by: INTERNAL MEDICINE

## 2023-02-08 RX ADMIN — EPOETIN ALFA-EPBX 10000 UNITS: 10000 INJECTION, SOLUTION INTRAVENOUS; SUBCUTANEOUS at 10:02

## 2023-02-23 ENCOUNTER — INFUSION (OUTPATIENT)
Dept: INFUSION THERAPY | Facility: HOSPITAL | Age: 73
End: 2023-02-23
Attending: INTERNAL MEDICINE
Payer: MEDICARE

## 2023-02-23 VITALS
TEMPERATURE: 98 F | OXYGEN SATURATION: 96 % | RESPIRATION RATE: 18 BRPM | HEART RATE: 59 BPM | SYSTOLIC BLOOD PRESSURE: 133 MMHG | WEIGHT: 179 LBS | HEIGHT: 62 IN | BODY MASS INDEX: 32.94 KG/M2 | DIASTOLIC BLOOD PRESSURE: 68 MMHG

## 2023-02-23 DIAGNOSIS — N18.4 CHRONIC KIDNEY DISEASE, STAGE 4 (SEVERE): ICD-10-CM

## 2023-02-23 DIAGNOSIS — N18.4 ANEMIA OF CHRONIC KIDNEY FAILURE, STAGE 4 (SEVERE): Primary | ICD-10-CM

## 2023-02-23 DIAGNOSIS — D63.1 ANEMIA OF CHRONIC KIDNEY FAILURE, STAGE 4 (SEVERE): Primary | ICD-10-CM

## 2023-02-23 LAB
HCT VFR BLD AUTO: 31.7 % (ref 37–47)
HGB BLD-MCNC: 9.9 G/DL (ref 12–16)
HGB, POC: 9.3 G/DL (ref 12–16)

## 2023-02-23 PROCEDURE — 63600175 PHARM REV CODE 636 W HCPCS: Mod: JG,EC | Performed by: INTERNAL MEDICINE

## 2023-02-23 PROCEDURE — 36415 COLL VENOUS BLD VENIPUNCTURE: CPT

## 2023-02-23 PROCEDURE — 96372 THER/PROPH/DIAG INJ SC/IM: CPT

## 2023-02-23 PROCEDURE — 85014 HEMATOCRIT: CPT | Performed by: INTERNAL MEDICINE

## 2023-02-23 RX ADMIN — EPOETIN ALFA-EPBX 10000 UNITS: 10000 INJECTION, SOLUTION INTRAVENOUS; SUBCUTANEOUS at 11:02

## 2023-03-09 ENCOUNTER — INFUSION (OUTPATIENT)
Dept: INFUSION THERAPY | Facility: HOSPITAL | Age: 73
End: 2023-03-09
Attending: INTERNAL MEDICINE
Payer: MEDICARE

## 2023-03-09 VITALS
HEIGHT: 62 IN | OXYGEN SATURATION: 96 % | HEART RATE: 64 BPM | WEIGHT: 179 LBS | BODY MASS INDEX: 32.94 KG/M2 | TEMPERATURE: 98 F | DIASTOLIC BLOOD PRESSURE: 51 MMHG | SYSTOLIC BLOOD PRESSURE: 126 MMHG | RESPIRATION RATE: 18 BRPM

## 2023-03-09 DIAGNOSIS — D63.1 ANEMIA OF CHRONIC KIDNEY FAILURE, STAGE 4 (SEVERE): Primary | ICD-10-CM

## 2023-03-09 DIAGNOSIS — N18.4 ANEMIA OF CHRONIC KIDNEY FAILURE, STAGE 4 (SEVERE): Primary | ICD-10-CM

## 2023-03-09 DIAGNOSIS — N18.4 CHRONIC KIDNEY DISEASE, STAGE 4 (SEVERE): ICD-10-CM

## 2023-03-09 LAB
HCT VFR BLD AUTO: 31.3 % (ref 37–47)
HGB BLD-MCNC: 10.1 G/DL (ref 12–16)
HGB, POC: 10.3 G/DL (ref 12–16)

## 2023-03-09 PROCEDURE — 63600175 PHARM REV CODE 636 W HCPCS: Mod: JZ,JG,EC | Performed by: INTERNAL MEDICINE

## 2023-03-09 PROCEDURE — 85014 HEMATOCRIT: CPT | Performed by: INTERNAL MEDICINE

## 2023-03-09 PROCEDURE — 36415 COLL VENOUS BLD VENIPUNCTURE: CPT

## 2023-03-09 PROCEDURE — 96372 THER/PROPH/DIAG INJ SC/IM: CPT

## 2023-03-09 RX ADMIN — EPOETIN ALFA-EPBX 10000 UNITS: 10000 INJECTION, SOLUTION INTRAVENOUS; SUBCUTANEOUS at 12:03

## 2023-03-22 ENCOUNTER — INFUSION (OUTPATIENT)
Dept: INFUSION THERAPY | Facility: HOSPITAL | Age: 73
End: 2023-03-22
Attending: INTERNAL MEDICINE
Payer: MEDICARE

## 2023-03-22 VITALS
SYSTOLIC BLOOD PRESSURE: 130 MMHG | TEMPERATURE: 98 F | HEIGHT: 62 IN | BODY MASS INDEX: 32.94 KG/M2 | OXYGEN SATURATION: 98 % | DIASTOLIC BLOOD PRESSURE: 77 MMHG | RESPIRATION RATE: 18 BRPM | HEART RATE: 67 BPM | WEIGHT: 179 LBS

## 2023-03-22 DIAGNOSIS — N18.4 ANEMIA OF CHRONIC KIDNEY FAILURE, STAGE 4 (SEVERE): Primary | ICD-10-CM

## 2023-03-22 DIAGNOSIS — N18.4 CHRONIC KIDNEY DISEASE, STAGE 4 (SEVERE): ICD-10-CM

## 2023-03-22 DIAGNOSIS — D63.1 ANEMIA OF CHRONIC KIDNEY FAILURE, STAGE 4 (SEVERE): Primary | ICD-10-CM

## 2023-03-22 LAB
HCT VFR BLD AUTO: 33.7 % (ref 37–47)
HGB BLD-MCNC: 10.9 G/DL (ref 12–16)
HGB, POC: 10.6 G/DL (ref 12–16)

## 2023-03-22 PROCEDURE — 36415 COLL VENOUS BLD VENIPUNCTURE: CPT

## 2023-03-22 PROCEDURE — 85014 HEMATOCRIT: CPT | Performed by: INTERNAL MEDICINE

## 2023-04-03 ENCOUNTER — LAB VISIT (OUTPATIENT)
Dept: LAB | Facility: HOSPITAL | Age: 73
End: 2023-04-03
Attending: INTERNAL MEDICINE
Payer: MEDICARE

## 2023-04-03 DIAGNOSIS — E87.5 HYPERPOTASSEMIA: ICD-10-CM

## 2023-04-03 DIAGNOSIS — E55.9 AVITAMINOSIS D: ICD-10-CM

## 2023-04-03 DIAGNOSIS — N18.32 CHRONIC KIDNEY DISEASE (CKD) STAGE G3B/A1, MODERATELY DECREASED GLOMERULAR FILTRATION RATE (GFR) BETWEEN 30-44 ML/MIN/1.73 SQUARE METER AND ALBUMINURIA CREATININE RATIO LESS THAN 30 MG/G: Primary | ICD-10-CM

## 2023-04-03 DIAGNOSIS — N18.9 ANEMIA OF CHRONIC RENAL FAILURE, UNSPECIFIED CKD STAGE: ICD-10-CM

## 2023-04-03 DIAGNOSIS — I12.9 PARENCHYMAL RENAL HYPERTENSION: ICD-10-CM

## 2023-04-03 DIAGNOSIS — N18.6 TYPE 2 DIABETES MELLITUS WITH END-STAGE RENAL DISEASE: ICD-10-CM

## 2023-04-03 DIAGNOSIS — E11.22 TYPE 2 DIABETES MELLITUS WITH END-STAGE RENAL DISEASE: ICD-10-CM

## 2023-04-03 DIAGNOSIS — E21.3 HYPERPARATHYROIDISM, UNSPECIFIED: ICD-10-CM

## 2023-04-03 DIAGNOSIS — I50.42 CHRONIC COMBINED SYSTOLIC AND DIASTOLIC HEART FAILURE: ICD-10-CM

## 2023-04-03 DIAGNOSIS — D63.1 ANEMIA OF CHRONIC RENAL FAILURE, UNSPECIFIED CKD STAGE: ICD-10-CM

## 2023-04-03 LAB
ALBUMIN SERPL-MCNC: 3.5 G/DL (ref 3.4–4.8)
ALBUMIN/GLOB SERPL: 0.9 RATIO (ref 1.1–2)
ALP SERPL-CCNC: 52 UNIT/L (ref 40–150)
ALT SERPL-CCNC: 7 UNIT/L (ref 0–55)
AST SERPL-CCNC: 24 UNIT/L (ref 5–34)
BASOPHILS # BLD AUTO: 0.03 X10(3)/MCL (ref 0–0.2)
BASOPHILS NFR BLD AUTO: 0.5 %
BILIRUBIN DIRECT+TOT PNL SERPL-MCNC: 0.8 MG/DL
BUN SERPL-MCNC: 46.8 MG/DL (ref 9.8–20.1)
CALCIUM SERPL-MCNC: 10.5 MG/DL (ref 8.4–10.2)
CHLORIDE SERPL-SCNC: 99 MMOL/L (ref 98–107)
CO2 SERPL-SCNC: 32 MMOL/L (ref 23–31)
CREAT SERPL-MCNC: 2.28 MG/DL (ref 0.55–1.02)
EOSINOPHIL # BLD AUTO: 0.25 X10(3)/MCL (ref 0–0.9)
EOSINOPHIL NFR BLD AUTO: 4.6 %
ERYTHROCYTE [DISTWIDTH] IN BLOOD BY AUTOMATED COUNT: 14.3 % (ref 11.5–17)
FERRITIN SERPL-MCNC: 395.2 NG/ML (ref 4.63–204)
GFR SERPLBLD CREATININE-BSD FMLA CKD-EPI: 22 MLS/MIN/1.73/M2
GLOBULIN SER-MCNC: 3.8 GM/DL (ref 2.4–3.5)
GLUCOSE SERPL-MCNC: 133 MG/DL (ref 82–115)
HCT VFR BLD AUTO: 31.7 % (ref 37–47)
HGB BLD-MCNC: 9.5 G/DL (ref 12–16)
IMM GRANULOCYTES # BLD AUTO: 0.01 X10(3)/MCL (ref 0–0.04)
IMM GRANULOCYTES NFR BLD AUTO: 0.2 %
IRON SATN MFR SERPL: 28 % (ref 20–50)
IRON SERPL-MCNC: 74 UG/DL (ref 50–170)
LYMPHOCYTES # BLD AUTO: 0.93 X10(3)/MCL (ref 0.6–4.6)
LYMPHOCYTES NFR BLD AUTO: 17 %
MAGNESIUM SERPL-MCNC: 2 MG/DL (ref 1.6–2.6)
MCH RBC QN AUTO: 27.2 PG (ref 27–31)
MCHC RBC AUTO-ENTMCNC: 30 G/DL (ref 33–36)
MCV RBC AUTO: 90.8 FL (ref 80–94)
MONOCYTES # BLD AUTO: 0.42 X10(3)/MCL (ref 0.1–1.3)
MONOCYTES NFR BLD AUTO: 7.7 %
NEUTROPHILS # BLD AUTO: 3.84 X10(3)/MCL (ref 2.1–9.2)
NEUTROPHILS NFR BLD AUTO: 70 %
PHOSPHATE SERPL-MCNC: 2.7 MG/DL (ref 2.3–4.7)
PLATELET # BLD AUTO: 260 X10(3)/MCL (ref 130–400)
PMV BLD AUTO: 10 FL (ref 7.4–10.4)
POTASSIUM SERPL-SCNC: 3.5 MMOL/L (ref 3.5–5.1)
PROT SERPL-MCNC: 7.3 GM/DL (ref 5.8–7.6)
RBC # BLD AUTO: 3.49 X10(6)/MCL (ref 4.2–5.4)
SODIUM SERPL-SCNC: 143 MMOL/L (ref 136–145)
TIBC SERPL-MCNC: 188 UG/DL (ref 70–310)
TIBC SERPL-MCNC: 262 UG/DL (ref 250–450)
TRANSFERRIN SERPL-MCNC: 242 MG/DL (ref 173–360)
WBC # SPEC AUTO: 5.5 X10(3)/MCL (ref 4.5–11.5)

## 2023-04-03 PROCEDURE — 83735 ASSAY OF MAGNESIUM: CPT

## 2023-04-03 PROCEDURE — 83550 IRON BINDING TEST: CPT

## 2023-04-03 PROCEDURE — 82728 ASSAY OF FERRITIN: CPT

## 2023-04-03 PROCEDURE — 83970 ASSAY OF PARATHORMONE: CPT

## 2023-04-03 PROCEDURE — 36415 COLL VENOUS BLD VENIPUNCTURE: CPT

## 2023-04-03 PROCEDURE — 80053 COMPREHEN METABOLIC PANEL: CPT

## 2023-04-03 PROCEDURE — 85025 COMPLETE CBC W/AUTO DIFF WBC: CPT

## 2023-04-03 PROCEDURE — 84100 ASSAY OF PHOSPHORUS: CPT

## 2023-04-04 LAB — PTH-INTACT SERPL-MCNC: 68.8 PG/ML (ref 8.7–77)

## 2023-04-05 ENCOUNTER — INFUSION (OUTPATIENT)
Dept: INFUSION THERAPY | Facility: HOSPITAL | Age: 73
End: 2023-04-05
Attending: INTERNAL MEDICINE
Payer: MEDICARE

## 2023-04-05 VITALS
SYSTOLIC BLOOD PRESSURE: 160 MMHG | WEIGHT: 179 LBS | DIASTOLIC BLOOD PRESSURE: 67 MMHG | OXYGEN SATURATION: 95 % | BODY MASS INDEX: 32.94 KG/M2 | TEMPERATURE: 99 F | HEART RATE: 60 BPM | RESPIRATION RATE: 18 BRPM | HEIGHT: 62 IN

## 2023-04-05 DIAGNOSIS — D63.1 ANEMIA OF CHRONIC KIDNEY FAILURE, STAGE 4 (SEVERE): Primary | ICD-10-CM

## 2023-04-05 DIAGNOSIS — N18.4 ANEMIA OF CHRONIC KIDNEY FAILURE, STAGE 4 (SEVERE): Primary | ICD-10-CM

## 2023-04-05 DIAGNOSIS — N18.4 CHRONIC KIDNEY DISEASE, STAGE 4 (SEVERE): ICD-10-CM

## 2023-04-05 PROCEDURE — 63600175 PHARM REV CODE 636 W HCPCS: Mod: JZ,JG | Performed by: INTERNAL MEDICINE

## 2023-04-05 PROCEDURE — 96372 THER/PROPH/DIAG INJ SC/IM: CPT

## 2023-04-05 RX ADMIN — EPOETIN ALFA-EPBX 10000 UNITS: 10000 INJECTION, SOLUTION INTRAVENOUS; SUBCUTANEOUS at 10:04

## 2023-04-05 NOTE — PROGRESS NOTES
PT HAD LAB DRAW ON Monday 4/3-PT REQUESTING NO BLOOD DRAW TODAY & GETTING SHOT BASED ON HGB OF 9.5    PROCRIT GIVEN AS ORDERED

## 2023-04-20 ENCOUNTER — INFUSION (OUTPATIENT)
Dept: INFUSION THERAPY | Facility: HOSPITAL | Age: 73
End: 2023-04-20
Attending: INTERNAL MEDICINE
Payer: MEDICARE

## 2023-04-20 VITALS
HEART RATE: 64 BPM | RESPIRATION RATE: 18 BRPM | OXYGEN SATURATION: 96 % | DIASTOLIC BLOOD PRESSURE: 66 MMHG | BODY MASS INDEX: 32.94 KG/M2 | HEIGHT: 62 IN | WEIGHT: 179 LBS | SYSTOLIC BLOOD PRESSURE: 142 MMHG | TEMPERATURE: 98 F

## 2023-04-20 DIAGNOSIS — N18.4 CHRONIC KIDNEY DISEASE, STAGE 4 (SEVERE): ICD-10-CM

## 2023-04-20 DIAGNOSIS — N18.4 ANEMIA OF CHRONIC KIDNEY FAILURE, STAGE 4 (SEVERE): Primary | ICD-10-CM

## 2023-04-20 DIAGNOSIS — D63.1 ANEMIA OF CHRONIC KIDNEY FAILURE, STAGE 4 (SEVERE): Primary | ICD-10-CM

## 2023-04-20 LAB
HCT VFR BLD AUTO: 32.1 % (ref 37–47)
HGB BLD-MCNC: 10.2 G/DL (ref 12–16)
HGB, POC: 10.2 G/DL (ref 12–16)

## 2023-04-20 PROCEDURE — 36415 COLL VENOUS BLD VENIPUNCTURE: CPT

## 2023-04-20 PROCEDURE — 96372 THER/PROPH/DIAG INJ SC/IM: CPT

## 2023-04-20 PROCEDURE — 85014 HEMATOCRIT: CPT | Performed by: INTERNAL MEDICINE

## 2023-04-20 PROCEDURE — 63600175 PHARM REV CODE 636 W HCPCS: Mod: JZ,JG | Performed by: INTERNAL MEDICINE

## 2023-04-20 RX ADMIN — EPOETIN ALFA-EPBX 10000 UNITS: 10000 INJECTION, SOLUTION INTRAVENOUS; SUBCUTANEOUS at 11:04

## 2023-05-04 ENCOUNTER — INFUSION (OUTPATIENT)
Dept: INFUSION THERAPY | Facility: HOSPITAL | Age: 73
End: 2023-05-04
Attending: INTERNAL MEDICINE
Payer: MEDICARE

## 2023-05-04 DIAGNOSIS — N18.4 CHRONIC KIDNEY DISEASE, STAGE 4 (SEVERE): ICD-10-CM

## 2023-05-04 DIAGNOSIS — N18.4 ANEMIA OF CHRONIC KIDNEY FAILURE, STAGE 4 (SEVERE): Primary | ICD-10-CM

## 2023-05-04 DIAGNOSIS — D63.1 ANEMIA OF CHRONIC KIDNEY FAILURE, STAGE 4 (SEVERE): Primary | ICD-10-CM

## 2023-05-04 LAB — HGB, POC: 10.8 G/DL (ref 12–16)

## 2023-05-18 ENCOUNTER — INFUSION (OUTPATIENT)
Dept: INFUSION THERAPY | Facility: HOSPITAL | Age: 73
End: 2023-05-18
Attending: INTERNAL MEDICINE
Payer: MEDICARE

## 2023-05-18 VITALS
HEART RATE: 84 BPM | BODY MASS INDEX: 32.94 KG/M2 | SYSTOLIC BLOOD PRESSURE: 142 MMHG | WEIGHT: 179 LBS | HEIGHT: 62 IN | TEMPERATURE: 98 F | OXYGEN SATURATION: 97 % | RESPIRATION RATE: 18 BRPM | DIASTOLIC BLOOD PRESSURE: 65 MMHG

## 2023-05-18 DIAGNOSIS — N18.4 CHRONIC KIDNEY DISEASE, STAGE 4 (SEVERE): ICD-10-CM

## 2023-05-18 DIAGNOSIS — D63.1 ANEMIA OF CHRONIC KIDNEY FAILURE, STAGE 4 (SEVERE): Primary | ICD-10-CM

## 2023-05-18 DIAGNOSIS — N18.4 ANEMIA OF CHRONIC KIDNEY FAILURE, STAGE 4 (SEVERE): Primary | ICD-10-CM

## 2023-05-18 LAB
HCT VFR BLD AUTO: 34.3 % (ref 37–47)
HGB BLD-MCNC: 10.8 G/DL (ref 12–16)
HGB, POC: 10.9 G/DL (ref 12–16)

## 2023-05-18 PROCEDURE — 85014 HEMATOCRIT: CPT | Performed by: INTERNAL MEDICINE

## 2023-05-18 PROCEDURE — 36415 COLL VENOUS BLD VENIPUNCTURE: CPT

## 2023-05-19 ENCOUNTER — LAB VISIT (OUTPATIENT)
Dept: LAB | Facility: HOSPITAL | Age: 73
End: 2023-05-19
Payer: MEDICARE

## 2023-05-19 DIAGNOSIS — E11.00 TYPE II DIABETES MELLITUS WITH HYPEROSMOLARITY, UNCONTROLLED: ICD-10-CM

## 2023-05-19 DIAGNOSIS — I10 ESSENTIAL HYPERTENSION, MALIGNANT: Primary | ICD-10-CM

## 2023-05-19 DIAGNOSIS — E11.42 DIABETIC POLYNEUROPATHY: ICD-10-CM

## 2023-05-19 DIAGNOSIS — E78.2 MIXED HYPERLIPIDEMIA: ICD-10-CM

## 2023-05-19 LAB
ALBUMIN SERPL-MCNC: 3.4 G/DL (ref 3.4–4.8)
ALBUMIN/GLOB SERPL: 0.8 RATIO (ref 1.1–2)
ALP SERPL-CCNC: 61 UNIT/L (ref 40–150)
ALT SERPL-CCNC: 8 UNIT/L (ref 0–55)
APPEARANCE UR: CLEAR
AST SERPL-CCNC: 21 UNIT/L (ref 5–34)
BACTERIA #/AREA URNS AUTO: NORMAL /HPF
BILIRUB UR QL STRIP.AUTO: NEGATIVE MG/DL
BILIRUBIN DIRECT+TOT PNL SERPL-MCNC: 0.7 MG/DL
BUN SERPL-MCNC: 60.2 MG/DL (ref 9.8–20.1)
CALCIUM SERPL-MCNC: 10.3 MG/DL (ref 8.4–10.2)
CHLORIDE SERPL-SCNC: 98 MMOL/L (ref 98–107)
CHOLEST SERPL-MCNC: 123 MG/DL
CHOLEST/HDLC SERPL: 3 {RATIO} (ref 0–5)
CO2 SERPL-SCNC: 32 MMOL/L (ref 23–31)
COLOR UR: YELLOW
CREAT SERPL-MCNC: 2.27 MG/DL (ref 0.55–1.02)
CRP SERPL-MCNC: 3.3 MG/L
ERYTHROCYTE [DISTWIDTH] IN BLOOD BY AUTOMATED COUNT: 13.1 % (ref 11.5–17)
EST. AVERAGE GLUCOSE BLD GHB EST-MCNC: 137 MG/DL
GFR SERPLBLD CREATININE-BSD FMLA CKD-EPI: 22 MLS/MIN/1.73/M2
GLOBULIN SER-MCNC: 4.5 GM/DL (ref 2.4–3.5)
GLUCOSE SERPL-MCNC: 148 MG/DL (ref 82–115)
GLUCOSE UR QL STRIP.AUTO: NEGATIVE MG/DL
HBA1C MFR BLD: 6.4 %
HCT VFR BLD AUTO: 34.2 % (ref 37–47)
HDLC SERPL-MCNC: 41 MG/DL (ref 35–60)
HGB BLD-MCNC: 10.9 G/DL (ref 12–16)
KETONES UR QL STRIP.AUTO: NEGATIVE MG/DL
LDLC SERPL CALC-MCNC: 63 MG/DL (ref 50–140)
LEUKOCYTE ESTERASE UR QL STRIP.AUTO: NEGATIVE UNIT/L
MCH RBC QN AUTO: 27.9 PG (ref 27–31)
MCHC RBC AUTO-ENTMCNC: 31.9 G/DL (ref 33–36)
MCV RBC AUTO: 87.7 FL (ref 80–94)
MICROALBUMIN UR-MCNC: <5 UG/ML
NITRITE UR QL STRIP.AUTO: NEGATIVE
PH UR STRIP.AUTO: 7 [PH]
PLATELET # BLD AUTO: 238 X10(3)/MCL (ref 130–400)
PMV BLD AUTO: 10.2 FL (ref 7.4–10.4)
POTASSIUM SERPL-SCNC: 3.5 MMOL/L (ref 3.5–5.1)
PROT SERPL-MCNC: 7.9 GM/DL (ref 5.8–7.6)
PROT UR QL STRIP.AUTO: NEGATIVE MG/DL
RBC # BLD AUTO: 3.9 X10(6)/MCL (ref 4.2–5.4)
RBC #/AREA URNS AUTO: NORMAL /HPF
RBC UR QL AUTO: NEGATIVE UNIT/L
SODIUM SERPL-SCNC: 142 MMOL/L (ref 136–145)
SP GR UR STRIP.AUTO: 1.01
SQUAMOUS #/AREA URNS AUTO: NORMAL /HPF
TRIGL SERPL-MCNC: 95 MG/DL (ref 37–140)
UROBILINOGEN UR STRIP-ACNC: 0.2 MG/DL
VLDLC SERPL CALC-MCNC: 19 MG/DL
WBC # SPEC AUTO: 4.82 X10(3)/MCL (ref 4.5–11.5)
WBC #/AREA URNS AUTO: NORMAL /HPF

## 2023-05-19 PROCEDURE — 36415 COLL VENOUS BLD VENIPUNCTURE: CPT

## 2023-05-19 PROCEDURE — 83525 ASSAY OF INSULIN: CPT | Mod: 90

## 2023-05-19 PROCEDURE — 86140 C-REACTIVE PROTEIN: CPT

## 2023-05-19 PROCEDURE — 82043 UR ALBUMIN QUANTITATIVE: CPT

## 2023-05-19 PROCEDURE — 80053 COMPREHEN METABOLIC PANEL: CPT

## 2023-05-19 PROCEDURE — 83036 HEMOGLOBIN GLYCOSYLATED A1C: CPT

## 2023-05-19 PROCEDURE — 80061 LIPID PANEL: CPT

## 2023-05-19 PROCEDURE — 81001 URINALYSIS AUTO W/SCOPE: CPT

## 2023-05-19 PROCEDURE — 85027 COMPLETE CBC AUTOMATED: CPT

## 2023-05-19 PROCEDURE — 84681 ASSAY OF C-PEPTIDE: CPT | Mod: 90

## 2023-05-20 LAB — INSULIN P FAST SERPL-ACNC: 5.4 MCIU/ML (ref 2.6–24.9)

## 2023-05-22 LAB — C PEPTIDE P FAST SERPL-MCNC: 4.4 NG/ML (ref 1.1–4.4)

## 2023-05-25 ENCOUNTER — HOSPITAL ENCOUNTER (EMERGENCY)
Facility: HOSPITAL | Age: 73
Discharge: HOME | End: 2023-05-25
Attending: EMERGENCY MEDICINE
Payer: MEDICARE

## 2023-05-25 VITALS
HEART RATE: 106 BPM | OXYGEN SATURATION: 96 % | BODY MASS INDEX: 31.28 KG/M2 | TEMPERATURE: 98 F | HEIGHT: 62 IN | WEIGHT: 170 LBS | DIASTOLIC BLOOD PRESSURE: 66 MMHG | RESPIRATION RATE: 20 BRPM | SYSTOLIC BLOOD PRESSURE: 130 MMHG

## 2023-05-25 DIAGNOSIS — E11.65 HYPERGLYCEMIA DUE TO DIABETES MELLITUS: Primary | ICD-10-CM

## 2023-05-25 DIAGNOSIS — R53.1 WEAKNESS: ICD-10-CM

## 2023-05-25 LAB
ALBUMIN SERPL-MCNC: 3.4 G/DL (ref 3.4–4.8)
ALBUMIN/GLOB SERPL: 0.8 RATIO (ref 1.1–2)
ALP SERPL-CCNC: 55 UNIT/L (ref 40–150)
ALT SERPL-CCNC: 10 UNIT/L (ref 0–55)
APPEARANCE UR: CLEAR
AST SERPL-CCNC: 21 UNIT/L (ref 5–34)
BACTERIA #/AREA URNS AUTO: ABNORMAL /HPF
BASOPHILS # BLD AUTO: 0.02 X10(3)/MCL
BASOPHILS NFR BLD AUTO: 0.5 %
BILIRUB UR QL STRIP.AUTO: NEGATIVE MG/DL
BILIRUBIN DIRECT+TOT PNL SERPL-MCNC: 0.8 MG/DL
BUN SERPL-MCNC: 48.4 MG/DL (ref 9.8–20.1)
CALCIUM SERPL-MCNC: 10.7 MG/DL (ref 8.4–10.2)
CHLORIDE SERPL-SCNC: 96 MMOL/L (ref 98–107)
CO2 SERPL-SCNC: 28 MMOL/L (ref 23–31)
COLOR UR: YELLOW
CREAT SERPL-MCNC: 2.12 MG/DL (ref 0.55–1.02)
EOSINOPHIL # BLD AUTO: 0.08 X10(3)/MCL (ref 0–0.9)
EOSINOPHIL NFR BLD AUTO: 1.8 %
ERYTHROCYTE [DISTWIDTH] IN BLOOD BY AUTOMATED COUNT: 13.3 % (ref 11.5–17)
GFR SERPLBLD CREATININE-BSD FMLA CKD-EPI: 24 MLS/MIN/1.73/M2
GLOBULIN SER-MCNC: 4.4 GM/DL (ref 2.4–3.5)
GLUCOSE SERPL-MCNC: 361 MG/DL (ref 82–115)
GLUCOSE UR QL STRIP.AUTO: 250 MG/DL
HCT VFR BLD AUTO: 36.8 % (ref 37–47)
HGB BLD-MCNC: 11.6 G/DL (ref 12–16)
IMM GRANULOCYTES # BLD AUTO: 0 X10(3)/MCL (ref 0–0.04)
IMM GRANULOCYTES NFR BLD AUTO: 0 %
KETONES UR QL STRIP.AUTO: NEGATIVE MG/DL
LEUKOCYTE ESTERASE UR QL STRIP.AUTO: ABNORMAL UNIT/L
LYMPHOCYTES # BLD AUTO: 0.95 X10(3)/MCL (ref 0.6–4.6)
LYMPHOCYTES NFR BLD AUTO: 21.7 %
MCH RBC QN AUTO: 27.2 PG (ref 27–31)
MCHC RBC AUTO-ENTMCNC: 31.5 G/DL (ref 33–36)
MCV RBC AUTO: 86.4 FL (ref 80–94)
MONOCYTES # BLD AUTO: 0.35 X10(3)/MCL (ref 0.1–1.3)
MONOCYTES NFR BLD AUTO: 8 %
NEUTROPHILS # BLD AUTO: 2.97 X10(3)/MCL (ref 2.1–9.2)
NEUTROPHILS NFR BLD AUTO: 68 %
NITRITE UR QL STRIP.AUTO: NEGATIVE
PH UR STRIP.AUTO: 5 [PH]
PLATELET # BLD AUTO: 254 X10(3)/MCL (ref 130–400)
PMV BLD AUTO: 10.4 FL (ref 7.4–10.4)
POC CARDIAC TROPONIN I: 0.08 NG/ML (ref 0–0.08)
POCT GLUCOSE: 362 MG/DL (ref 70–110)
POTASSIUM SERPL-SCNC: 3.3 MMOL/L (ref 3.5–5.1)
PROT SERPL-MCNC: 7.8 GM/DL (ref 5.8–7.6)
PROT UR QL STRIP.AUTO: 30 MG/DL
RBC # BLD AUTO: 4.26 X10(6)/MCL (ref 4.2–5.4)
RBC #/AREA URNS AUTO: ABNORMAL /HPF
RBC UR QL AUTO: NEGATIVE UNIT/L
SAMPLE: NORMAL
SODIUM SERPL-SCNC: 138 MMOL/L (ref 136–145)
SP GR UR STRIP.AUTO: 1.02
SQUAMOUS #/AREA URNS AUTO: ABNORMAL /HPF
UROBILINOGEN UR STRIP-ACNC: 0.2 MG/DL
WBC # SPEC AUTO: 4.37 X10(3)/MCL (ref 4.5–11.5)
WBC #/AREA URNS AUTO: ABNORMAL /HPF

## 2023-05-25 PROCEDURE — 85025 COMPLETE CBC W/AUTO DIFF WBC: CPT | Performed by: EMERGENCY MEDICINE

## 2023-05-25 PROCEDURE — 99284 EMERGENCY DEPT VISIT MOD MDM: CPT | Mod: 25

## 2023-05-25 PROCEDURE — 84484 ASSAY OF TROPONIN QUANT: CPT

## 2023-05-25 PROCEDURE — 80053 COMPREHEN METABOLIC PANEL: CPT | Performed by: EMERGENCY MEDICINE

## 2023-05-25 PROCEDURE — 82962 GLUCOSE BLOOD TEST: CPT

## 2023-05-25 PROCEDURE — 81001 URINALYSIS AUTO W/SCOPE: CPT | Performed by: EMERGENCY MEDICINE

## 2023-05-25 PROCEDURE — 93005 ELECTROCARDIOGRAM TRACING: CPT

## 2023-05-25 PROCEDURE — 63600175 PHARM REV CODE 636 W HCPCS: Performed by: EMERGENCY MEDICINE

## 2023-05-25 PROCEDURE — 96374 THER/PROPH/DIAG INJ IV PUSH: CPT

## 2023-05-25 RX ORDER — LEVOTHYROXINE SODIUM 88 UG/1
1 CAPSULE ORAL DAILY
COMMUNITY
Start: 2023-05-24

## 2023-05-25 RX ORDER — SITAGLIPTIN 25 MG/1
25 TABLET, FILM COATED ORAL DAILY
COMMUNITY
Start: 2023-05-08

## 2023-05-25 RX ADMIN — INSULIN HUMAN 7 UNITS: 100 INJECTION, SOLUTION PARENTERAL at 02:05

## 2023-05-25 NOTE — ED NOTES
Called pt  cardiologist office to request a copy of laura recent ekg- pt had cardiac defi placed 6 weeks ago -  - lt  bbb-afib w rvr

## 2023-05-25 NOTE — ED NOTES
Pt w hx dm presents with  dizzness/weakness- hx high  cbg at home today - denies fever/cough or nv.   Gcs15 , resps even/unalbored,  periheral pulses +2x2/equal. /peña.

## 2023-05-25 NOTE — ED PROVIDER NOTES
Encounter Date: 5/25/2023       History     Chief Complaint   Patient presents with    weakness with high cbg     Weakness with high cbg of 350 at home/ aasi is 414/started this am     This 73-year-old female presents with complaints that her blood sugar is high today.  Her blood sugar was read by [facility].  She states it normally runs 130-140.  She took Januvia this morning.  She states for breakfast she ate a half of a blueberry tart.  She denies any other problems.  No nausea no vomiting no diarrhea no chest pain no shortness breath no fever.  She reports that she had a defibrillator placed several weeks ago.    Review of patient's allergies indicates:   Allergen Reactions    Statins-hmg-coa reductase inhibitors Hives and Swelling    Phenergan [promethazine] Hives    Wool Blisters    Iodine and iodide containing products Rash     Iodine and seafood allergy    Opioids - morphine analogues Nausea And Vomiting    Tetanus vaccines and toxoid Nausea And Vomiting    Zofran [ondansetron hcl] Nausea And Vomiting     Past Medical History:   Diagnosis Date    Chronic kidney disease, unspecified     Pt states stage 4    Diabetes     Diverticulitis     Essential (primary) hypertension     Heart attack     Hypercholesteremia     Hypothyroidism     Mixed hyperlipidemia     Nonrheumatic mitral (valve) insufficiency     Obesity, unspecified     Paroxysmal atrial fibrillation     Unspecified osteoarthritis, unspecified site      Past Surgical History:   Procedure Laterality Date    APPENDECTOMY      CORONARY ARTERY BYPASS GRAFT      DEBRIDEMENT Right 12/21/2021    Muscle and/or fascia    HYSTERECTOMY      INSERTION OF PACEMAKER      KNEE SURGERY      MITRAL VALVE SURGERY      SHOULDER SURGERY      Stent in heart       TONSILLECTOMY       Family History   Problem Relation Age of Onset    Heart attack Mother     Heart attacks under age 50 Father     Cancer Sister      Social History     Tobacco Use    Smoking status:  Never    Smokeless tobacco: Never   Substance Use Topics    Alcohol use: Not Currently    Drug use: Never     Review of Systems   Constitutional:  Negative for fever.   HENT:  Negative for sore throat.    Respiratory:  Negative for shortness of breath.    Cardiovascular:  Negative for chest pain.   Gastrointestinal:  Negative for nausea.   Genitourinary:  Negative for dysuria.   Musculoskeletal:  Negative for back pain.   Skin:  Negative for rash.   Neurological:  Negative for weakness.   Hematological:  Does not bruise/bleed easily.     Physical Exam     Initial Vitals [05/25/23 1243]   BP Pulse Resp Temp SpO2   (!) 126/94 98 (!) 24 97.7 °F (36.5 °C) 99 %      MAP       --         Physical Exam    Nursing note and vitals reviewed.  Constitutional: She appears well-developed and well-nourished.   HENT:   Head: Normocephalic and atraumatic.   Eyes: Conjunctivae and EOM are normal. Pupils are equal, round, and reactive to light.   Neck: Neck supple.   Normal range of motion.  Cardiovascular:  Normal rate, regular rhythm, normal heart sounds and intact distal pulses.           Pulmonary/Chest: Breath sounds normal.   Abdominal: Abdomen is soft. Bowel sounds are normal.   Musculoskeletal:         General: Normal range of motion.      Cervical back: Normal range of motion and neck supple.     Neurological: She is alert and oriented to person, place, and time. She has normal strength.   Skin: Skin is warm and dry. Capillary refill takes less than 2 seconds.   Psychiatric: She has a normal mood and affect. Her behavior is normal. Judgment and thought content normal.       ED Course   Procedures  Labs Reviewed   COMPREHENSIVE METABOLIC PANEL - Abnormal; Notable for the following components:       Result Value    Potassium Level 3.3 (*)     Chloride 96 (*)     Glucose Level 361 (*)     Blood Urea Nitrogen 48.4 (*)     Creatinine 2.12 (*)     Calcium Level Total 10.7 (*)     Protein Total 7.8 (*)     Globulin 4.4 (*)      Albumin/Globulin Ratio 0.8 (*)     All other components within normal limits   URINALYSIS, REFLEX TO URINE CULTURE - Abnormal; Notable for the following components:    Protein, UA 30 (*)     Glucose,  (*)     Leukocyte Esterase, UA Trace (*)     All other components within normal limits   CBC WITH DIFFERENTIAL - Abnormal; Notable for the following components:    WBC 4.37 (*)     Hgb 11.6 (*)     Hct 36.8 (*)     MCHC 31.5 (*)     All other components within normal limits   URINALYSIS, MICROSCOPIC - Abnormal; Notable for the following components:    WBC, UA 6-10 (*)     All other components within normal limits   POCT GLUCOSE - Abnormal; Notable for the following components:    POCT Glucose 362 (*)     All other components within normal limits   CBC W/ AUTO DIFFERENTIAL    Narrative:     The following orders were created for panel order CBC auto differential.  Procedure                               Abnormality         Status                     ---------                               -----------         ------                     CBC with Differential[[id number]]        Abnormal            Final result                 Please view results for these tests on the individual orders.   TROPONIN ISTAT   POCT TROPONIN     EKG Readings: (Independently Interpreted)   Rhythm: Atrial Fibrillation. Heart Rate: 125. Ectopy: No Ectopy. Conduction: LBBB. Axis: Normal.   5/25/23 12:49     Imaging Results    None          Medications   insulin regular injection 7 Units 0.07 mL (has no administration in time range)                              Clinical Impression:   Final diagnoses:  [R53.1] Weakness  [E11.65] Hyperglycemia due to diabetes mellitus (Primary)        ED Disposition Condition    Discharge Stable          ED Prescriptions    None       Follow-up Information       Follow up With Specialties Details Why Contact Info    Marla Taveras MD Family Medicine Schedule an appointment as soon as possible for a visit   [address]  [address]  [facility] [address]  [phone]               Edouard Su MD  05/25/23 1452       Edouard Su MD  05/25/23 1524

## 2023-05-30 ENCOUNTER — LAB VISIT (OUTPATIENT)
Dept: LAB | Facility: HOSPITAL | Age: 73
End: 2023-05-30
Attending: INTERNAL MEDICINE
Payer: MEDICARE

## 2023-05-30 DIAGNOSIS — E21.3 HYPERPARATHYROIDISM, UNSPECIFIED: ICD-10-CM

## 2023-05-30 DIAGNOSIS — N18.9 ANEMIA OF CHRONIC RENAL FAILURE, UNSPECIFIED CKD STAGE: ICD-10-CM

## 2023-05-30 DIAGNOSIS — N18.32 CHRONIC KIDNEY DISEASE (CKD) STAGE G3B/A1, MODERATELY DECREASED GLOMERULAR FILTRATION RATE (GFR) BETWEEN 30-44 ML/MIN/1.73 SQUARE METER AND ALBUMINURIA CREATININE RATIO LESS THAN 30 MG/G: Primary | ICD-10-CM

## 2023-05-30 DIAGNOSIS — E11.22 TYPE 2 DIABETES MELLITUS WITH END-STAGE RENAL DISEASE: ICD-10-CM

## 2023-05-30 DIAGNOSIS — I50.42 CHRONIC COMBINED SYSTOLIC AND DIASTOLIC HEART FAILURE: ICD-10-CM

## 2023-05-30 DIAGNOSIS — N18.6 TYPE 2 DIABETES MELLITUS WITH END-STAGE RENAL DISEASE: ICD-10-CM

## 2023-05-30 DIAGNOSIS — D63.1 ANEMIA OF CHRONIC RENAL FAILURE, UNSPECIFIED CKD STAGE: ICD-10-CM

## 2023-05-30 DIAGNOSIS — E55.9 AVITAMINOSIS D: ICD-10-CM

## 2023-05-30 DIAGNOSIS — E87.5 HYPERPOTASSEMIA: ICD-10-CM

## 2023-05-30 DIAGNOSIS — I12.9 PARENCHYMAL RENAL HYPERTENSION: ICD-10-CM

## 2023-05-30 LAB
ALBUMIN SERPL-MCNC: 3.3 G/DL (ref 3.4–4.8)
ALBUMIN/GLOB SERPL: 0.8 RATIO (ref 1.1–2)
ALP SERPL-CCNC: 49 UNIT/L (ref 40–150)
ALT SERPL-CCNC: 9 UNIT/L (ref 0–55)
AST SERPL-CCNC: 22 UNIT/L (ref 5–34)
BASOPHILS # BLD AUTO: 0.02 X10(3)/MCL
BASOPHILS NFR BLD AUTO: 0.4 %
BILIRUBIN DIRECT+TOT PNL SERPL-MCNC: 0.7 MG/DL
BUN SERPL-MCNC: 43.1 MG/DL (ref 9.8–20.1)
CALCIUM SERPL-MCNC: 10.2 MG/DL (ref 8.4–10.2)
CHLORIDE SERPL-SCNC: 98 MMOL/L (ref 98–107)
CO2 SERPL-SCNC: 35 MMOL/L (ref 23–31)
CREAT SERPL-MCNC: 1.76 MG/DL (ref 0.55–1.02)
EOSINOPHIL # BLD AUTO: 0.09 X10(3)/MCL (ref 0–0.9)
EOSINOPHIL NFR BLD AUTO: 1.9 %
ERYTHROCYTE [DISTWIDTH] IN BLOOD BY AUTOMATED COUNT: 13.2 % (ref 11.5–17)
FERRITIN SERPL-MCNC: 471.3 NG/ML (ref 4.63–204)
GFR SERPLBLD CREATININE-BSD FMLA CKD-EPI: 30 MLS/MIN/1.73/M2
GLOBULIN SER-MCNC: 4.2 GM/DL (ref 2.4–3.5)
GLUCOSE SERPL-MCNC: 144 MG/DL (ref 82–115)
HCT VFR BLD AUTO: 35.5 % (ref 37–47)
HGB BLD-MCNC: 11.2 G/DL (ref 12–16)
IMM GRANULOCYTES # BLD AUTO: 0.01 X10(3)/MCL (ref 0–0.04)
IMM GRANULOCYTES NFR BLD AUTO: 0.2 %
IRON SATN MFR SERPL: 27 % (ref 20–50)
IRON SERPL-MCNC: 67 UG/DL (ref 50–170)
LYMPHOCYTES # BLD AUTO: 1.25 X10(3)/MCL (ref 0.6–4.6)
LYMPHOCYTES NFR BLD AUTO: 26.7 %
MAGNESIUM SERPL-MCNC: 2.5 MG/DL (ref 1.6–2.6)
MCH RBC QN AUTO: 27.7 PG (ref 27–31)
MCHC RBC AUTO-ENTMCNC: 31.5 G/DL (ref 33–36)
MCV RBC AUTO: 87.9 FL (ref 80–94)
MONOCYTES # BLD AUTO: 0.4 X10(3)/MCL (ref 0.1–1.3)
MONOCYTES NFR BLD AUTO: 8.5 %
NEUTROPHILS # BLD AUTO: 2.92 X10(3)/MCL (ref 2.1–9.2)
NEUTROPHILS NFR BLD AUTO: 62.3 %
PHOSPHATE SERPL-MCNC: 3.1 MG/DL (ref 2.3–4.7)
PLATELET # BLD AUTO: 255 X10(3)/MCL (ref 130–400)
PMV BLD AUTO: 10.4 FL (ref 7.4–10.4)
POTASSIUM SERPL-SCNC: 3.7 MMOL/L (ref 3.5–5.1)
PROT SERPL-MCNC: 7.5 GM/DL (ref 5.8–7.6)
PTH-INTACT SERPL-MCNC: 70.4 PG/ML (ref 8.7–77)
RBC # BLD AUTO: 4.04 X10(6)/MCL (ref 4.2–5.4)
SODIUM SERPL-SCNC: 142 MMOL/L (ref 136–145)
TIBC SERPL-MCNC: 184 UG/DL (ref 70–310)
TIBC SERPL-MCNC: 251 UG/DL (ref 250–450)
TRANSFERRIN SERPL-MCNC: 233 MG/DL (ref 173–360)
WBC # SPEC AUTO: 4.69 X10(3)/MCL (ref 4.5–11.5)

## 2023-05-30 PROCEDURE — 85025 COMPLETE CBC W/AUTO DIFF WBC: CPT

## 2023-05-30 PROCEDURE — 83550 IRON BINDING TEST: CPT

## 2023-05-30 PROCEDURE — 36415 COLL VENOUS BLD VENIPUNCTURE: CPT

## 2023-05-30 PROCEDURE — 82728 ASSAY OF FERRITIN: CPT

## 2023-05-30 PROCEDURE — 80053 COMPREHEN METABOLIC PANEL: CPT

## 2023-05-30 PROCEDURE — 84100 ASSAY OF PHOSPHORUS: CPT

## 2023-05-30 PROCEDURE — 83970 ASSAY OF PARATHORMONE: CPT

## 2023-05-30 PROCEDURE — 83735 ASSAY OF MAGNESIUM: CPT

## 2023-06-01 ENCOUNTER — INFUSION (OUTPATIENT)
Dept: INFUSION THERAPY | Facility: HOSPITAL | Age: 73
End: 2023-06-01
Attending: INTERNAL MEDICINE
Payer: MEDICARE

## 2023-06-01 VITALS
TEMPERATURE: 98 F | HEART RATE: 65 BPM | WEIGHT: 170 LBS | BODY MASS INDEX: 31.28 KG/M2 | DIASTOLIC BLOOD PRESSURE: 62 MMHG | HEIGHT: 62 IN | SYSTOLIC BLOOD PRESSURE: 141 MMHG | OXYGEN SATURATION: 97 % | RESPIRATION RATE: 18 BRPM

## 2023-06-01 DIAGNOSIS — N18.4 CHRONIC KIDNEY DISEASE, STAGE 4 (SEVERE): ICD-10-CM

## 2023-06-01 DIAGNOSIS — N18.4 ANEMIA OF CHRONIC KIDNEY FAILURE, STAGE 4 (SEVERE): Primary | ICD-10-CM

## 2023-06-01 DIAGNOSIS — D63.1 ANEMIA OF CHRONIC KIDNEY FAILURE, STAGE 4 (SEVERE): Primary | ICD-10-CM

## 2023-06-01 LAB
HCT VFR BLD AUTO: 34 % (ref 37–47)
HGB BLD-MCNC: 11.1 G/DL (ref 12–16)
HGB, POC: 11.4 G/DL (ref 12–16)

## 2023-06-01 PROCEDURE — 85014 HEMATOCRIT: CPT | Performed by: INTERNAL MEDICINE

## 2023-06-01 PROCEDURE — 36415 COLL VENOUS BLD VENIPUNCTURE: CPT

## 2023-06-14 ENCOUNTER — INFUSION (OUTPATIENT)
Dept: INFUSION THERAPY | Facility: HOSPITAL | Age: 73
End: 2023-06-14
Attending: INTERNAL MEDICINE
Payer: MEDICARE

## 2023-06-14 VITALS
BODY MASS INDEX: 30.91 KG/M2 | WEIGHT: 168 LBS | RESPIRATION RATE: 18 BRPM | TEMPERATURE: 99 F | HEIGHT: 62 IN | SYSTOLIC BLOOD PRESSURE: 125 MMHG | OXYGEN SATURATION: 96 % | HEART RATE: 75 BPM | DIASTOLIC BLOOD PRESSURE: 66 MMHG

## 2023-06-14 DIAGNOSIS — N18.4 CHRONIC KIDNEY DISEASE, STAGE 4 (SEVERE): ICD-10-CM

## 2023-06-14 DIAGNOSIS — D63.1 ANEMIA OF CHRONIC KIDNEY FAILURE, STAGE 4 (SEVERE): Primary | ICD-10-CM

## 2023-06-14 DIAGNOSIS — N18.4 ANEMIA OF CHRONIC KIDNEY FAILURE, STAGE 4 (SEVERE): Primary | ICD-10-CM

## 2023-06-14 LAB
HCT VFR BLD AUTO: 34.6 % (ref 37–47)
HGB BLD-MCNC: 11.3 G/DL (ref 12–16)
HGB, POC: 11.4 G/DL (ref 12–16)

## 2023-06-14 PROCEDURE — 85014 HEMATOCRIT: CPT | Performed by: INTERNAL MEDICINE

## 2023-06-14 PROCEDURE — 36415 COLL VENOUS BLD VENIPUNCTURE: CPT

## 2023-06-23 ENCOUNTER — LAB VISIT (OUTPATIENT)
Dept: LAB | Facility: HOSPITAL | Age: 73
End: 2023-06-23
Attending: INTERNAL MEDICINE
Payer: MEDICARE

## 2023-06-23 DIAGNOSIS — E87.5 HYPERPOTASSEMIA: ICD-10-CM

## 2023-06-23 DIAGNOSIS — I50.42 CHRONIC COMBINED SYSTOLIC AND DIASTOLIC HEART FAILURE: ICD-10-CM

## 2023-06-23 DIAGNOSIS — D63.1 ANEMIA OF CHRONIC RENAL FAILURE, UNSPECIFIED CKD STAGE: ICD-10-CM

## 2023-06-23 DIAGNOSIS — N18.32 CHRONIC KIDNEY DISEASE (CKD) STAGE G3B/A1, MODERATELY DECREASED GLOMERULAR FILTRATION RATE (GFR) BETWEEN 30-44 ML/MIN/1.73 SQUARE METER AND ALBUMINURIA CREATININE RATIO LESS THAN 30 MG/G: ICD-10-CM

## 2023-06-23 DIAGNOSIS — E55.9 AVITAMINOSIS D: Primary | ICD-10-CM

## 2023-06-23 DIAGNOSIS — E21.3 HYPERPARATHYROIDISM, UNSPECIFIED: ICD-10-CM

## 2023-06-23 DIAGNOSIS — I12.9 PARENCHYMAL RENAL HYPERTENSION: ICD-10-CM

## 2023-06-23 DIAGNOSIS — E11.22 TYPE 2 DIABETES MELLITUS WITH END-STAGE RENAL DISEASE: ICD-10-CM

## 2023-06-23 DIAGNOSIS — N18.6 TYPE 2 DIABETES MELLITUS WITH END-STAGE RENAL DISEASE: ICD-10-CM

## 2023-06-23 DIAGNOSIS — N18.9 ANEMIA OF CHRONIC RENAL FAILURE, UNSPECIFIED CKD STAGE: ICD-10-CM

## 2023-06-23 LAB
ANION GAP SERPL CALC-SCNC: 10 MEQ/L
BUN SERPL-MCNC: 27.3 MG/DL (ref 9.8–20.1)
CALCIUM SERPL-MCNC: 10.7 MG/DL (ref 8.4–10.2)
CHLORIDE SERPL-SCNC: 102 MMOL/L (ref 98–107)
CO2 SERPL-SCNC: 30 MMOL/L (ref 23–31)
CREAT SERPL-MCNC: 1.38 MG/DL (ref 0.55–1.02)
CREAT/UREA NIT SERPL: 20
GFR SERPLBLD CREATININE-BSD FMLA CKD-EPI: 40 MLS/MIN/1.73/M2
GLUCOSE SERPL-MCNC: 142 MG/DL (ref 82–115)
POTASSIUM SERPL-SCNC: 3.7 MMOL/L (ref 3.5–5.1)
SODIUM SERPL-SCNC: 142 MMOL/L (ref 136–145)

## 2023-06-23 PROCEDURE — 80048 BASIC METABOLIC PNL TOTAL CA: CPT

## 2023-06-23 PROCEDURE — 36415 COLL VENOUS BLD VENIPUNCTURE: CPT

## 2023-06-25 ENCOUNTER — HOSPITAL ENCOUNTER (EMERGENCY)
Facility: HOSPITAL | Age: 73
Discharge: ANOTHER HOSPITAL | End: 2023-06-26
Attending: STUDENT IN AN ORGANIZED HEALTH CARE EDUCATION/TRAINING PROGRAM
Payer: MEDICARE

## 2023-06-25 DIAGNOSIS — R42 DIZZINESS: ICD-10-CM

## 2023-06-25 DIAGNOSIS — I21.4 NSTEMI (NON-ST ELEVATED MYOCARDIAL INFARCTION): Primary | ICD-10-CM

## 2023-06-25 LAB
ALBUMIN SERPL-MCNC: 3.3 G/DL (ref 3.4–4.8)
ALBUMIN/GLOB SERPL: 0.8 RATIO (ref 1.1–2)
ALP SERPL-CCNC: 63 UNIT/L (ref 40–150)
ALT SERPL-CCNC: 10 UNIT/L (ref 0–55)
AST SERPL-CCNC: 22 UNIT/L (ref 5–34)
BASOPHILS # BLD AUTO: 0.02 X10(3)/MCL
BASOPHILS NFR BLD AUTO: 0.3 %
BILIRUBIN DIRECT+TOT PNL SERPL-MCNC: 0.6 MG/DL
BNP BLD-MCNC: 224 PG/ML
BUN SERPL-MCNC: 43 MG/DL (ref 9.8–20.1)
CALCIUM SERPL-MCNC: 10.1 MG/DL (ref 8.4–10.2)
CHLORIDE SERPL-SCNC: 95 MMOL/L (ref 98–107)
CO2 SERPL-SCNC: 34 MMOL/L (ref 23–31)
CREAT SERPL-MCNC: 2.28 MG/DL (ref 0.55–1.02)
D DIMER PPP IA.FEU-MCNC: 16.9 UG/ML FEU (ref 0–0.5)
EOSINOPHIL # BLD AUTO: 0.13 X10(3)/MCL (ref 0–0.9)
EOSINOPHIL NFR BLD AUTO: 2.3 %
ERYTHROCYTE [DISTWIDTH] IN BLOOD BY AUTOMATED COUNT: 13.3 % (ref 11.5–17)
GFR SERPLBLD CREATININE-BSD FMLA CKD-EPI: 22 MLS/MIN/1.73/M2
GLOBULIN SER-MCNC: 4.2 GM/DL (ref 2.4–3.5)
GLUCOSE SERPL-MCNC: 158 MG/DL (ref 82–115)
HCT VFR BLD AUTO: 33.4 % (ref 37–47)
HGB BLD-MCNC: 10.6 G/DL (ref 12–16)
IMM GRANULOCYTES # BLD AUTO: 0.01 X10(3)/MCL (ref 0–0.04)
IMM GRANULOCYTES NFR BLD AUTO: 0.2 %
LYMPHOCYTES # BLD AUTO: 1.5 X10(3)/MCL (ref 0.6–4.6)
LYMPHOCYTES NFR BLD AUTO: 26 %
MCH RBC QN AUTO: 27.5 PG (ref 27–31)
MCHC RBC AUTO-ENTMCNC: 31.7 G/DL (ref 33–36)
MCV RBC AUTO: 86.5 FL (ref 80–94)
MONOCYTES # BLD AUTO: 0.6 X10(3)/MCL (ref 0.1–1.3)
MONOCYTES NFR BLD AUTO: 10.4 %
NEUTROPHILS # BLD AUTO: 3.5 X10(3)/MCL (ref 2.1–9.2)
NEUTROPHILS NFR BLD AUTO: 60.8 %
PLATELET # BLD AUTO: 241 X10(3)/MCL (ref 130–400)
PMV BLD AUTO: 10.5 FL (ref 7.4–10.4)
POC CARDIAC TROPONIN I: 0.31 NG/ML (ref 0–0.08)
POC CARDIAC TROPONIN I: 0.92 NG/ML (ref 0–0.08)
POTASSIUM SERPL-SCNC: 3.7 MMOL/L (ref 3.5–5.1)
PROT SERPL-MCNC: 7.5 GM/DL (ref 5.8–7.6)
RBC # BLD AUTO: 3.86 X10(6)/MCL (ref 4.2–5.4)
SAMPLE: ABNORMAL
SAMPLE: ABNORMAL
SODIUM SERPL-SCNC: 141 MMOL/L (ref 136–145)
WBC # SPEC AUTO: 5.76 X10(3)/MCL (ref 4.5–11.5)

## 2023-06-25 PROCEDURE — 93005 ELECTROCARDIOGRAM TRACING: CPT

## 2023-06-25 PROCEDURE — 99285 EMERGENCY DEPT VISIT HI MDM: CPT | Mod: 25

## 2023-06-25 PROCEDURE — 25000003 PHARM REV CODE 250: Performed by: STUDENT IN AN ORGANIZED HEALTH CARE EDUCATION/TRAINING PROGRAM

## 2023-06-25 PROCEDURE — 83880 ASSAY OF NATRIURETIC PEPTIDE: CPT | Performed by: STUDENT IN AN ORGANIZED HEALTH CARE EDUCATION/TRAINING PROGRAM

## 2023-06-25 PROCEDURE — 85025 COMPLETE CBC W/AUTO DIFF WBC: CPT | Performed by: STUDENT IN AN ORGANIZED HEALTH CARE EDUCATION/TRAINING PROGRAM

## 2023-06-25 PROCEDURE — 80053 COMPREHEN METABOLIC PANEL: CPT | Performed by: STUDENT IN AN ORGANIZED HEALTH CARE EDUCATION/TRAINING PROGRAM

## 2023-06-25 PROCEDURE — 93010 ELECTROCARDIOGRAM REPORT: CPT | Mod: ,,, | Performed by: STUDENT IN AN ORGANIZED HEALTH CARE EDUCATION/TRAINING PROGRAM

## 2023-06-25 PROCEDURE — 93010 EKG 12-LEAD: ICD-10-PCS | Mod: ,,, | Performed by: STUDENT IN AN ORGANIZED HEALTH CARE EDUCATION/TRAINING PROGRAM

## 2023-06-25 PROCEDURE — 85379 FIBRIN DEGRADATION QUANT: CPT | Performed by: STUDENT IN AN ORGANIZED HEALTH CARE EDUCATION/TRAINING PROGRAM

## 2023-06-25 RX ORDER — SODIUM CHLORIDE 9 MG/ML
1000 INJECTION, SOLUTION INTRAVENOUS
Status: DISCONTINUED | OUTPATIENT
Start: 2023-06-25 | End: 2023-06-25

## 2023-06-25 RX ORDER — ENOXAPARIN SODIUM 100 MG/ML
70 INJECTION SUBCUTANEOUS
Status: COMPLETED | OUTPATIENT
Start: 2023-06-25 | End: 2023-06-26

## 2023-06-25 RX ORDER — SODIUM CHLORIDE 9 MG/ML
500 INJECTION, SOLUTION INTRAVENOUS
Status: COMPLETED | OUTPATIENT
Start: 2023-06-25 | End: 2023-06-25

## 2023-06-25 RX ORDER — NAPROXEN SODIUM 220 MG/1
162 TABLET, FILM COATED ORAL
Status: COMPLETED | OUTPATIENT
Start: 2023-06-25 | End: 2023-06-25

## 2023-06-25 RX ADMIN — SODIUM CHLORIDE 500 ML: 9 INJECTION, SOLUTION INTRAVENOUS at 10:06

## 2023-06-25 RX ADMIN — ASPIRIN 81 MG 162 MG: 81 TABLET ORAL at 10:06

## 2023-06-26 ENCOUNTER — HOSPITAL ENCOUNTER (INPATIENT)
Facility: HOSPITAL | Age: 73
LOS: 2 days | Discharge: LEFT BEFORE DISCHARGE | DRG: 247 | End: 2023-06-29
Attending: EMERGENCY MEDICINE | Admitting: INTERNAL MEDICINE
Payer: MEDICARE

## 2023-06-26 VITALS
HEART RATE: 69 BPM | SYSTOLIC BLOOD PRESSURE: 147 MMHG | WEIGHT: 170 LBS | RESPIRATION RATE: 16 BRPM | HEIGHT: 62 IN | BODY MASS INDEX: 31.28 KG/M2 | DIASTOLIC BLOOD PRESSURE: 62 MMHG | OXYGEN SATURATION: 98 % | TEMPERATURE: 98 F

## 2023-06-26 DIAGNOSIS — R07.9 CHEST PAIN: ICD-10-CM

## 2023-06-26 DIAGNOSIS — R79.89 POSITIVE D DIMER: ICD-10-CM

## 2023-06-26 DIAGNOSIS — I21.4 NSTEMI (NON-ST ELEVATED MYOCARDIAL INFARCTION): Primary | ICD-10-CM

## 2023-06-26 DIAGNOSIS — R42 ORTHOSTATIC DIZZINESS: ICD-10-CM

## 2023-06-26 DIAGNOSIS — N17.9 AKI (ACUTE KIDNEY INJURY): ICD-10-CM

## 2023-06-26 LAB
ALBUMIN SERPL-MCNC: 3.1 G/DL (ref 3.4–4.8)
ALBUMIN/GLOB SERPL: 0.9 RATIO (ref 1.1–2)
ALP SERPL-CCNC: 53 UNIT/L (ref 40–150)
ALT SERPL-CCNC: 11 UNIT/L (ref 0–55)
AST SERPL-CCNC: 23 UNIT/L (ref 5–34)
BASOPHILS # BLD AUTO: 0.02 X10(3)/MCL
BASOPHILS NFR BLD AUTO: 0.4 %
BILIRUBIN DIRECT+TOT PNL SERPL-MCNC: 0.8 MG/DL
BUN SERPL-MCNC: 42.5 MG/DL (ref 9.8–20.1)
CALCIUM SERPL-MCNC: 9.3 MG/DL (ref 8.4–10.2)
CHLORIDE SERPL-SCNC: 96 MMOL/L (ref 98–107)
CO2 SERPL-SCNC: 31 MMOL/L (ref 23–31)
CREAT SERPL-MCNC: 1.95 MG/DL (ref 0.55–1.02)
EOSINOPHIL # BLD AUTO: 0.14 X10(3)/MCL (ref 0–0.9)
EOSINOPHIL NFR BLD AUTO: 3.1 %
ERYTHROCYTE [DISTWIDTH] IN BLOOD BY AUTOMATED COUNT: 13.3 % (ref 11.5–17)
GFR SERPLBLD CREATININE-BSD FMLA CKD-EPI: 27 MLS/MIN/1.73/M2
GLOBULIN SER-MCNC: 3.4 GM/DL (ref 2.4–3.5)
GLUCOSE SERPL-MCNC: 122 MG/DL (ref 82–115)
HCT VFR BLD AUTO: 29.9 % (ref 37–47)
HGB BLD-MCNC: 9.7 G/DL (ref 12–16)
IMM GRANULOCYTES # BLD AUTO: 0.01 X10(3)/MCL (ref 0–0.04)
IMM GRANULOCYTES NFR BLD AUTO: 0.2 %
LYMPHOCYTES # BLD AUTO: 1.45 X10(3)/MCL (ref 0.6–4.6)
LYMPHOCYTES NFR BLD AUTO: 31.7 %
MAGNESIUM SERPL-MCNC: 2.3 MG/DL (ref 1.6–2.6)
MCH RBC QN AUTO: 27.9 PG (ref 27–31)
MCHC RBC AUTO-ENTMCNC: 32.4 G/DL (ref 33–36)
MCV RBC AUTO: 85.9 FL (ref 80–94)
MONOCYTES # BLD AUTO: 0.56 X10(3)/MCL (ref 0.1–1.3)
MONOCYTES NFR BLD AUTO: 12.3 %
NEUTROPHILS # BLD AUTO: 2.39 X10(3)/MCL (ref 2.1–9.2)
NEUTROPHILS NFR BLD AUTO: 52.3 %
NRBC BLD AUTO-RTO: 0 %
PHOSPHATE SERPL-MCNC: 3.7 MG/DL (ref 2.3–4.7)
PLATELET # BLD AUTO: 205 X10(3)/MCL (ref 130–400)
PMV BLD AUTO: 11.3 FL (ref 7.4–10.4)
POCT GLUCOSE: 86 MG/DL (ref 70–110)
POTASSIUM SERPL-SCNC: 2.9 MMOL/L (ref 3.5–5.1)
PROT SERPL-MCNC: 6.5 GM/DL (ref 5.8–7.6)
RBC # BLD AUTO: 3.48 X10(6)/MCL (ref 4.2–5.4)
SODIUM SERPL-SCNC: 139 MMOL/L (ref 136–145)
TROPONIN I SERPL-MCNC: 1.2 NG/ML (ref 0–0.04)
TROPONIN I SERPL-MCNC: 1.51 NG/ML (ref 0–0.04)
TROPONIN I SERPL-MCNC: 2.07 NG/ML (ref 0–0.04)
WBC # SPEC AUTO: 4.57 X10(3)/MCL (ref 4.5–11.5)

## 2023-06-26 PROCEDURE — 93010 ELECTROCARDIOGRAM REPORT: CPT | Mod: ,,, | Performed by: STUDENT IN AN ORGANIZED HEALTH CARE EDUCATION/TRAINING PROGRAM

## 2023-06-26 PROCEDURE — 83735 ASSAY OF MAGNESIUM: CPT | Performed by: NURSE PRACTITIONER

## 2023-06-26 PROCEDURE — 63600175 PHARM REV CODE 636 W HCPCS: Performed by: STUDENT IN AN ORGANIZED HEALTH CARE EDUCATION/TRAINING PROGRAM

## 2023-06-26 PROCEDURE — 25000003 PHARM REV CODE 250: Performed by: EMERGENCY MEDICINE

## 2023-06-26 PROCEDURE — 63600175 PHARM REV CODE 636 W HCPCS: Performed by: NURSE PRACTITIONER

## 2023-06-26 PROCEDURE — 96372 THER/PROPH/DIAG INJ SC/IM: CPT | Performed by: STUDENT IN AN ORGANIZED HEALTH CARE EDUCATION/TRAINING PROGRAM

## 2023-06-26 PROCEDURE — 25000003 PHARM REV CODE 250: Performed by: INTERNAL MEDICINE

## 2023-06-26 PROCEDURE — 99285 EMERGENCY DEPT VISIT HI MDM: CPT | Mod: 25

## 2023-06-26 PROCEDURE — 25000003 PHARM REV CODE 250: Performed by: NURSE PRACTITIONER

## 2023-06-26 PROCEDURE — 80053 COMPREHEN METABOLIC PANEL: CPT | Performed by: EMERGENCY MEDICINE

## 2023-06-26 PROCEDURE — G0378 HOSPITAL OBSERVATION PER HR: HCPCS

## 2023-06-26 PROCEDURE — 84100 ASSAY OF PHOSPHORUS: CPT | Performed by: NURSE PRACTITIONER

## 2023-06-26 PROCEDURE — 93005 ELECTROCARDIOGRAM TRACING: CPT

## 2023-06-26 PROCEDURE — 93010 EKG 12-LEAD: ICD-10-PCS | Mod: ,,, | Performed by: STUDENT IN AN ORGANIZED HEALTH CARE EDUCATION/TRAINING PROGRAM

## 2023-06-26 PROCEDURE — 84484 ASSAY OF TROPONIN QUANT: CPT | Performed by: EMERGENCY MEDICINE

## 2023-06-26 PROCEDURE — 96372 THER/PROPH/DIAG INJ SC/IM: CPT | Performed by: NURSE PRACTITIONER

## 2023-06-26 PROCEDURE — 85025 COMPLETE CBC W/AUTO DIFF WBC: CPT | Performed by: EMERGENCY MEDICINE

## 2023-06-26 RX ORDER — METOLAZONE 2.5 MG/1
2.5 TABLET ORAL
COMMUNITY

## 2023-06-26 RX ORDER — POTASSIUM CHLORIDE 14.9 MG/ML
40 INJECTION INTRAVENOUS ONCE
Status: COMPLETED | OUTPATIENT
Start: 2023-06-26 | End: 2023-06-26

## 2023-06-26 RX ORDER — HYDRALAZINE HYDROCHLORIDE 20 MG/ML
20 INJECTION INTRAMUSCULAR; INTRAVENOUS
Status: DISCONTINUED | OUTPATIENT
Start: 2023-06-26 | End: 2023-06-29 | Stop reason: HOSPADM

## 2023-06-26 RX ORDER — ENOXAPARIN SODIUM 100 MG/ML
1 INJECTION SUBCUTANEOUS EVERY 24 HOURS
Status: DISCONTINUED | OUTPATIENT
Start: 2023-06-26 | End: 2023-06-29

## 2023-06-26 RX ORDER — SODIUM CHLORIDE 9 MG/ML
1000 INJECTION, SOLUTION INTRAVENOUS
Status: COMPLETED | OUTPATIENT
Start: 2023-06-26 | End: 2023-06-26

## 2023-06-26 RX ORDER — DAPAGLIFLOZIN 10 MG/1
10 TABLET, FILM COATED ORAL DAILY
COMMUNITY
Start: 2023-06-17

## 2023-06-26 RX ORDER — ENOXAPARIN SODIUM 100 MG/ML
30 INJECTION SUBCUTANEOUS EVERY 24 HOURS
Status: DISCONTINUED | OUTPATIENT
Start: 2023-06-27 | End: 2023-06-26

## 2023-06-26 RX ORDER — NALOXONE HCL 0.4 MG/ML
0.02 VIAL (ML) INJECTION
Status: DISCONTINUED | OUTPATIENT
Start: 2023-06-26 | End: 2023-06-29 | Stop reason: HOSPADM

## 2023-06-26 RX ORDER — CARVEDILOL 12.5 MG/1
25 TABLET ORAL 2 TIMES DAILY
Status: DISCONTINUED | OUTPATIENT
Start: 2023-06-26 | End: 2023-06-29 | Stop reason: HOSPADM

## 2023-06-26 RX ORDER — POLYETHYLENE GLYCOL 3350 17 G/17G
17 POWDER, FOR SOLUTION ORAL 2 TIMES DAILY PRN
Status: DISCONTINUED | OUTPATIENT
Start: 2023-06-26 | End: 2023-06-29 | Stop reason: HOSPADM

## 2023-06-26 RX ORDER — ACETAMINOPHEN 325 MG/1
650 TABLET ORAL EVERY 6 HOURS PRN
Status: DISCONTINUED | OUTPATIENT
Start: 2023-06-26 | End: 2023-06-29 | Stop reason: HOSPADM

## 2023-06-26 RX ORDER — POTASSIUM CHLORIDE 20 MEQ/1
40 TABLET, EXTENDED RELEASE ORAL ONCE
Status: COMPLETED | OUTPATIENT
Start: 2023-06-26 | End: 2023-06-26

## 2023-06-26 RX ORDER — SIMETHICONE 80 MG
1 TABLET,CHEWABLE ORAL 4 TIMES DAILY PRN
Status: DISCONTINUED | OUTPATIENT
Start: 2023-06-26 | End: 2023-06-29 | Stop reason: HOSPADM

## 2023-06-26 RX ORDER — HYDRALAZINE HYDROCHLORIDE 25 MG/1
25 TABLET, FILM COATED ORAL 2 TIMES DAILY
Status: DISCONTINUED | OUTPATIENT
Start: 2023-06-26 | End: 2023-06-29 | Stop reason: HOSPADM

## 2023-06-26 RX ORDER — LEVOTHYROXINE SODIUM 88 UG/1
88 TABLET ORAL DAILY
Status: DISCONTINUED | OUTPATIENT
Start: 2023-06-28 | End: 2023-06-29 | Stop reason: HOSPADM

## 2023-06-26 RX ORDER — LABETALOL HYDROCHLORIDE 5 MG/ML
20 INJECTION, SOLUTION INTRAVENOUS
Status: DISCONTINUED | OUTPATIENT
Start: 2023-06-26 | End: 2023-06-29 | Stop reason: HOSPADM

## 2023-06-26 RX ORDER — MAG HYDROX/ALUMINUM HYD/SIMETH 200-200-20
30 SUSPENSION, ORAL (FINAL DOSE FORM) ORAL 4 TIMES DAILY PRN
Status: DISCONTINUED | OUTPATIENT
Start: 2023-06-26 | End: 2023-06-29 | Stop reason: HOSPADM

## 2023-06-26 RX ORDER — TALC
6 POWDER (GRAM) TOPICAL NIGHTLY PRN
Status: DISCONTINUED | OUTPATIENT
Start: 2023-06-26 | End: 2023-06-29 | Stop reason: HOSPADM

## 2023-06-26 RX ADMIN — SODIUM CHLORIDE 1000 ML: 9 INJECTION, SOLUTION INTRAVENOUS at 02:06

## 2023-06-26 RX ADMIN — ENOXAPARIN SODIUM 80 MG: 80 INJECTION SUBCUTANEOUS at 04:06

## 2023-06-26 RX ADMIN — POTASSIUM CHLORIDE 40 MEQ: 14.9 INJECTION, SOLUTION INTRAVENOUS at 06:06

## 2023-06-26 RX ADMIN — POTASSIUM CHLORIDE 40 MEQ: 1500 TABLET, EXTENDED RELEASE ORAL at 08:06

## 2023-06-26 RX ADMIN — ACETAMINOPHEN 650 MG: 325 TABLET, FILM COATED ORAL at 10:06

## 2023-06-26 RX ADMIN — ENOXAPARIN SODIUM 70 MG: 80 INJECTION SUBCUTANEOUS at 12:06

## 2023-06-26 RX ADMIN — CARVEDILOL 25 MG: 12.5 TABLET, FILM COATED ORAL at 08:06

## 2023-06-26 RX ADMIN — HYDRALAZINE HYDROCHLORIDE 25 MG: 25 TABLET, FILM COATED ORAL at 08:06

## 2023-06-26 NOTE — PROGRESS NOTES
Pharmacist Renal Dose Adjustment Note    Rahel Bagley is a 73 y.o. female being treated with the medication enoxaparin    Patient Data:    Vital Signs (Most Recent):  Temp: 98.8 °F (37.1 °C) (06/26/23 0230)  Pulse: 71 (06/26/23 0800)  Resp: 15 (06/26/23 0800)  BP: (!) 144/67 (06/26/23 0800)  SpO2: 97 % (06/26/23 0800) Vital Signs (72h Range):  Temp:  [97.8 °F (36.6 °C)-98.8 °F (37.1 °C)]   Pulse:  [65-76]   Resp:  [15-22]   BP: (122-157)/(51-67)   SpO2:  [95 %-100 %]      Recent Labs   Lab 06/23/23  0959 06/25/23 2057 06/26/23  0403   CREATININE 1.38* 2.28* 1.95*     Serum creatinine: 1.95 mg/dL (H) 06/26/23 0403  Estimated creatinine clearance: 24.7 mL/min (A)    Enoxaparin 40 mg SQ Q24H will be changed to enoxaparin 30 mg SQ Q24H based on patient's eCrCl < 30 mL/min per pharmacy protocol.    Pharmacist's Name: Ivana Bynum, MariferD  Pharmacist's Extension: [phone]

## 2023-06-26 NOTE — Clinical Note
The catheter was repositioned into the ostial LIMA graft. An angiography was performed of the graft. Multiple views were taken. The angiography was performed via power injection.  JR. QUICK TO LAD PATENT

## 2023-06-26 NOTE — PLAN OF CARE
WILLIAM Perez completed with pily friend, over the phone. Email sent to ANA Randall for delivery.

## 2023-06-26 NOTE — Clinical Note
Diagnosis: NSTEMI (non-ST elevated myocardial infarction) [[id number]]   Future Attending Provider: KYLAH FLEMING [[id number]]   Admitting Provider:: KYLAH FLEMING [[id number]]

## 2023-06-26 NOTE — Clinical Note
The catheter was repositioned into the ostial SVG graft. An angiography was performed of the graft. Multiple views were taken. The angiography was performed via power injection.  JR.  SVG TO RCA NOT PATENT

## 2023-06-26 NOTE — H&P
[facility] Medicine History & Physical Examination       Patient Name: Rahel Bagley  MRN: [id number]  Patient Class: OP- Observation   Admission Date: 6/26/2023   Admitting Physician: Isaac Rubin MD   Length of Stay: 0  Attending Physician: Isaac Rubin MD   Primary Care Provider: Marla Taveras MD  Face-to-Face encounter date: 06/26/2023  Code Status:Full code   Chief Complaint: NSTEMI (NSTEMI from [place]. C/o of dizziness on arrival, denies CP)        Patient information was obtained from patient, patient's family, past medical records and ER records.     HISTORY OF PRESENT ILLNESS:   Rahel Bagley is a 73 y.o. female with a past medical history of essential hypertension, hyperlipidemia, CAD, PAF, MI, CKD, diabetes mellitus type 2, hypothyroidism, osteoarthritis, and obesity presented to [facility] on 6/26/2023 transferred from [place] for NSTEMI.  Patient presented to outside facility for dizziness.  Patient reported feeling lightheaded, nauseous,  and near syncopal with floaters in her vision.  Patient reported symptoms were worse with head position better with rest.  Patient denied chest pain, palpitations, shortness of breath, abdominal pain, vomiting, diarrhea, fever, chills, dysuria, hematuria, rectal bleeding, and dark stools.   Workup at outside facility revealed WBC 5.76, RBC 3.86, hemoglobin 10.6, hematocrit 33.4, chloride 95, CO2 34, BUN 43, creatinine 2.28, glucose 158, , POC troponin 0.92, and D-dimer 16.90.  EKG revealed paced rhythm with heart rate of 67 beats per minute.  Chest x-ray revealed no acute cardiopulmonary process identified.  CT head revealed no acute intracranial process identified.  Cardiology was consulted with recommendations of single dose Lovenox and transfer to [facility] ER for higher level of care.  V/Q scan and bilateral lower extremity venous ultrasounds were ordered upon arrival. Patient was admitted to hospital  medicine service for further medical management.     PAST MEDICAL HISTORY:   Essential hypertension  Hyperlipidemia  CAD  PAF  MI   CKD   Diabetes mellitus type 2   Hypothyroidism   Osteoarthritis   Obesity    PAST SURGICAL HISTORY:     Past Surgical History:   Procedure Laterality Date    APPENDECTOMY      CORONARY ARTERY BYPASS GRAFT      DEBRIDEMENT Right 12/21/2021    Muscle and/or fascia    HYSTERECTOMY      INSERTION OF PACEMAKER      KNEE SURGERY      MITRAL VALVE SURGERY      SHOULDER SURGERY      Stent in heart       TONSILLECTOMY         ALLERGIES:   Statins-hmg-coa reductase inhibitors, Phenergan [promethazine], Wool, Iodine and iodide containing products, Opioids - morphine analogues, Tetanus vaccines and toxoid, and Zofran [ondansetron hcl]    FAMILY HISTORY:   Mother: MI, CABG  Father: MI    SOCIAL HISTORY:   Denies tobacco, drug, and alcohol use    HOME MEDICATIONS:     Prior to Admission medications    Medication Sig Start Date End Date Taking? Authorizing Provider   carvediloL (COREG) 6.25 MG tablet  4/11/22  Yes Historical Provider   fenofibrate micronized (LOFIBRA) 200 MG Cap Take 200 mg by mouth.   Yes Historical Provider   hydrALAZINE (APRESOLINE) 25 MG tablet Take 25 mg by mouth 2 (two) times daily. 5/10/22  Yes Historical Provider   JANUVIA 25 mg Tab Take 25 mg by mouth Daily. 5/8/23  Yes Historical Provider   levothyroxine (TIROSINT) 88 mcg Cap Take 1 capsule by mouth Daily. 5/24/23  Yes Historical Provider   REPATHA SURECLICK 140 mg/mL PnIj  4/11/22  Yes Historical Provider   torsemide (DEMADEX) 20 MG Tab Take 40 mg by mouth.   Yes Historical Provider   FARXIGA 10 mg tablet Take 10 mg by mouth once daily. 6/17/23   Historical Provider   metOLazone (ZAROXOLYN) 2.5 MG tablet Take 2.5 mg by mouth. 1 tablet three times a week (Monday, Wednesday, and Friday)    Historical Provider       REVIEW OF SYSTEMS:   Except as documented, all other systems reviewed and negative     PHYSICAL EXAM:     VITAL  SIGNS: 24 HRS MIN & MAX LAST   Temp  Min: 97.8 °F (36.6 °C)  Max: 98.8 °F (37.1 °C) 98.8 °F (37.1 °C)   BP  Min: 122/51  Max: 157/63 (!) 149/63   Pulse  Min: 65  Max: 76  66   Resp  Min: 16  Max: 22 (!) 21   SpO2  Min: 95 %  Max: 100 % 95 %       General appearance: Female in no apparent distress.  HEENNT: Atraumatic head.   Lungs: Clear to auscultation bilaterally.   Heart: Regular rate and rhythm.    Abdomen: Soft, non-distended, non-tender. Bowel sounds are normal.   Extremities: No cyanosis, clubbing, or edema. No deformities.   Skin: No Rash. Warm and dry.   Neuro: Awake, alert, and oriented. Motor and sensory exams grossly intact.   Psych/mental status: Appropriate mood and affect. Responds appropriately to questions.     LABS AND IMAGING:     Recent Labs   Lab 06/25/23 2057 06/26/23  0403   WBC 5.76 4.57   RBC 3.86* 3.48*   HGB 10.6* 9.7*   HCT 33.4* 29.9*   MCV 86.5 85.9   MCH 27.5 27.9   MCHC 31.7* 32.4*   RDW 13.3 13.3    205   MPV 10.5* 11.3*       Recent Labs   Lab 06/23/23  0959 06/25/23 2057 06/26/23  0403    141 139   K 3.7 3.7 2.9*   CO2 30 34* 31   BUN 27.3* 43.0* 42.5*   CREATININE 1.38* 2.28* 1.95*   CALCIUM 10.7* 10.1 9.3   MG  --   --  2.30   ALBUMIN  --  3.3* 3.1*   ALKPHOS  --  63 53   ALT  --  10 11   AST  --  22 23   BILITOT  --  0.6 0.8       Microbiology Results (last 7 days)       ** No results found for the last 168 hours. **             CT Head Without Contrast  START OF REPORT:  Technique: CT of the head was performed without intravenous contrast with axial as well as coronal and sagittal images.    Comparison: None.    Dosage Information: Automated Exposure Control was utilized 1105.1 mGy.cm.    Clinical history: Dizziness (Pt c/o dizziness and nausea that started around 1500 today. Pt denies CP/ SOB. Pt reports her glucose was 179 PTA.    Findings:  Hemorrhage: No acute intracranial hemorrhage is seen.  CSF spaces: The ventricles, sulci and basal cisterns all appear  mildly prominent consistent with global cerebral atrophy.  Brain parenchyma: There is preservation of the grey white junction throughout. Mild microvascular change is seen in portions of the periventricular and deep white matter tracts.  Cerebellum: Unremarkable.  Sella and skull base: The sella appears to be within normal limits for age.  Herniation: None.  Intracranial calcifications: Incidental note is made of bilateral choroid plexus calcification. Incidental note is made of some pineal region calcification.  Calvarium: No acute linear or depressed skull fracture is seen.    Maxillofacial Structures:  Paranasal sinuses: There is some mucoperiosteal thickening in the left maxillary sinuses. The rest of the paranasal sinuses appear clear.  Orbits: The orbits appear unremarkable.  Zygomatic arches: The zygomatic arches are intact and unremarkable.  Temporal bones and mastoids: The temporal bones and mastoids appear unremarkable.  TMJ: The mandibular condyles appear normally placed with respect to the mandibular fossa.    Impression:  1. No acute intracranial process identified. Details and other findings as noted above.  X-Ray Chest 1 View  Narrative: EXAMINATION:  XR CHEST 1 VIEW    CLINICAL HISTORY:  nstemi;    TECHNIQUE:  One    COMPARISON:  April 11, 2020.    FINDINGS:  Cardiopericardial silhouette enlarged appearance similar.  Sternotomy changes.  Cardiac device electrodes terminate within the right atrium and the right ventricle.  No acute dense focal or segmental consolidation, congestive process, pleural effusions or pneumothorax.  Right shoulder arthroplasty.  Impression: NO ACUTE CARDIOPULMONARY PROCESS IDENTIFIED.    Electronically signed by: Wily Kearns  Date:    06/25/2023  Time:    22:44        ASSESSMENT & PLAN:   Assessment:  NSTEMI, likely type 2  Dizziness   Elevated D-dimer  VINCENT on CKD   Hypokalemia  Hyperglycemia with history of diabetes mellitus type 2  Normocytic anemia  History of essential  hypertension, hyperlipidemia, CAD, PAF, MI, hypothyroidism, osteoarthritis, and obesity     Plan:  Cardiac monitoring   Trend troponin  Cardiology consulted, appreciate recommendations   Venous ultrasounds of bilateral lower extremities- no evidence of deep or superficial vein thrombosis in the bilateral lower extremities.  V/Q scan pending, follow-up results  Potassium replaced in ED- 40 mEq  Continue to monitor potassium, replace as needed   Insulin sliding scale with Accu-checks  Continue appropriate home medications once med rec updated   Labs in a.m.    VTE Prophylaxis:  Lovenox    __________________________________________________________________________  INPATIENT LIST OF MEDICATIONS     Scheduled Meds:   epoetin martin-epbx  10,000 Units Subcutaneous 1 time in Clinic/HOD    epoetin martin-epbx  10,000 Units Subcutaneous 1 time in Clinic/HOD    epoetin martin-epbx  10,000 Units Subcutaneous 1 time in Clinic/HOD    epoetin martin-epbx  10,000 Units Subcutaneous 1 time in Clinic/HOD     Continuous Infusions:  PRN Meds:.acetaminophen, aluminum-magnesium hydroxide-simethicone, melatonin, naloxone, polyethylene glycol, simethicone      I, Cezar Santos PA-C, have reviewed and discussed the case with Dr. Isaac Rubin MD   Please see the following addendum for further assessment and plan from there attending MD.    06/26/2023    ________________________________________________________________________________    MD Addendum:  I,  assumed care of this patient today at ---am/pm  For the patient encounter, I performed the substantive portion of the visit, I reviewed the PA documentation, treatment plan, and medical decision making.  I had face to face time with this patient     A. History:    B. Physical exam:    C. Medical decision making:    Discharge Planning and Disposition: No mobility needs. Ambulating well. Good social support system.   Anticipated discharge    All diagnosis and differential diagnosis have been  reviewed; assessment and plan has been documented; I have personally reviewed the labs and test results that are presently available; I have reviewed the patients medication list; I have reviewed the consulting providers response and recommendations. I have reviewed or attempted to review medical records based upon their availability.    All of the patient and family questions have been addressed and answered. Patient's is agreeable to the above stated plan. I will continue to monitor closely and make adjustments to medical management as needed.      06/26/2023

## 2023-06-26 NOTE — CONSULTS
Patient Demographics:  Name: Rahel Bagley  Age:  73 y.o.  MRN:  [id number]  Admission Date: 6/26/2023    Chief Complaint upon admit:    Chief Complaint   Patient presents with    NSTEMI     NSTEMI from [place]. C/o of dizziness on arrival, denies CP    dizzy    History of Present Illness:  Patient 73-year-old established in outpatient clinic with a history of coronary artery disease status p CABG 2020 with LIMA to the LAD, SVG to the diagonal OM with left atrial appendage ligation and maze ablation, chronic systolic heart failure status post BiV ICD placement per Dr. Montanez presents to [facility] emergency room for complaints of severe dizziness worse with standing and turning head side-to-side.  Work-up revealed prerenal azotemia with a BUN/creatinine of 43 and 2.28 respectively.  During hospitalization troponin drawn coming back elevated at 1.5 prompting transfer to [facility] due to no beds at [facility].  Patient does state this is somewhat similar to her prestent/pre-CABG symptoms in the past, no recurrence of symptoms since admit but has not gotten out of bed.  Of note she is also markedly hypokalemic with IV replacement in progress.  She has a history of known chronic kidney disease followed by Dr. Flor in the past.  H&H 9 and 29 down from 10 and 33 yesterday after fluid resuscitation..  Last in office perfusion scan November 2022 with last angiography May 2022 which patient underwent PTCA stent of the left ostial circumflex.  In office MUGA scan revealed significantly decreased ejection fraction of 29% prompting BiV ICD per Dr. Montanez.  D-dimer elevated with negative VQ    Past Medical History:   Diagnosis Date    Chronic kidney disease, unspecified     Pt states stage 4    Diabetes     Diverticulitis     Essential (primary) hypertension     Heart attack     Hypercholesteremia     Hypothyroidism     Mixed hyperlipidemia     Nonrheumatic mitral (valve) insufficiency     Obesity, unspecified      Paroxysmal atrial fibrillation     Unspecified osteoarthritis, unspecified site        Social History     Socioeconomic History    Marital status:    Tobacco Use    Smoking status: Never    Smokeless tobacco: Never   Substance and Sexual Activity    Alcohol use: Not Currently    Drug use: Never       Past Surgical History:   Procedure Laterality Date    APPENDECTOMY      CORONARY ARTERY BYPASS GRAFT      DEBRIDEMENT Right 12/21/2021    Muscle and/or fascia    HYSTERECTOMY      INSERTION OF PACEMAKER      KNEE SURGERY      MITRAL VALVE SURGERY      SHOULDER SURGERY      Stent in heart       TONSILLECTOMY         Family History   Problem Relation Age of Onset    Heart attack Mother     Heart attacks under age 50 Father     Cancer Sister        Allergies:   Review of patient's allergies indicates:   Allergen Reactions    Statins-hmg-coa reductase inhibitors Hives and Swelling    Phenergan [promethazine] Hives    Wool Blisters    Iodine and iodide containing products Rash     Iodine and seafood allergy    Opioids - morphine analogues Nausea And Vomiting    Tetanus vaccines and toxoid Nausea And Vomiting    Zofran [ondansetron hcl] Nausea And Vomiting       Prior to Admission medications    Medication Sig Start Date End Date Taking? Authorizing Provider   carvediloL (COREG) 6.25 MG tablet  4/11/22  Yes Historical Provider   fenofibrate micronized (LOFIBRA) 200 MG Cap Take 200 mg by mouth.   Yes Historical Provider   hydrALAZINE (APRESOLINE) 25 MG tablet Take 25 mg by mouth 2 (two) times daily. 5/10/22  Yes Historical Provider   JANUVIA 25 mg Tab Take 25 mg by mouth Daily. 5/8/23  Yes Historical Provider   levothyroxine (TIROSINT) 88 mcg Cap Take 1 capsule by mouth Daily. 5/24/23  Yes Historical Provider   REPATHA SURECLICK 140 mg/mL PnIj  4/11/22  Yes Historical Provider   torsemide (DEMADEX) 20 MG Tab Take 40 mg by mouth.   Yes Historical Provider   FARXIGA 10 mg tablet Take 10 mg by mouth once daily. 6/17/23    "Historical Provider   metOLazone (ZAROXOLYN) 2.5 MG tablet Take 2.5 mg by mouth. 1 tablet three times a week (Monday, Wednesday, and Friday)    Historical Provider         Current Facility-Administered Medications:     acetaminophen tablet 650 mg, 650 mg, Oral, Q6H PRN, Patsy Lal AGACNP-BC    aluminum-magnesium hydroxide-simethicone 200-200-20 mg/5 mL suspension 30 mL, 30 mL, Oral, QID PRN, Patsy Lal AGACNP-BC    [START ON 6/27/2023] enoxaparin injection 30 mg, 30 mg, Subcutaneous, Q24H (prophylaxis, 1700), Cezar Santos PA-C    melatonin tablet 6 mg, 6 mg, Oral, Nightly PRN, Patsy Lal AGACNP-BC    naloxone 0.4 mg/mL injection 0.02 mg, 0.02 mg, Intravenous, PRN, Patsy Lal AGACNP-BC    polyethylene glycol packet 17 g, 17 g, Oral, BID PRN, Patsy JOSE ALFREDO GonzalezLukasz, AGACNP-BC    simethicone chewable tablet 80 mg, 1 tablet, Oral, QID PRN, Patsy Lal, AGACNP-BC    There are no hospital problems to display for this patient.      Blood pressure (!) 160/73, pulse 66, temperature 98.2 °F (36.8 °C), temperature source Oral, resp. rate 18, height 5' 2" (1.575 m), weight 77.1 kg (170 lb), SpO2 95 %.         Review of Systems   Constitutional: Negative.   HENT: Negative.     Cardiovascular: Negative.    Respiratory: Negative.     Endocrine: Negative.    Skin: Negative.    Musculoskeletal: Negative.    Gastrointestinal: Negative.    Genitourinary: Negative.    Neurological: Negative.    Psychiatric/Behavioral: Negative.     Allergic/Immunologic: Negative.    All other systems reviewed and are negative.      Physical Exam  Constitutional:       Appearance: Normal appearance.   HENT:      Head: Normocephalic and atraumatic.      Nose: Nose normal.      Mouth/Throat:      Mouth: Mucous membranes are dry.      Pharynx: Oropharynx is clear.   Eyes:      Conjunctiva/sclera: Conjunctivae normal.   Cardiovascular:      Rate and Rhythm: Normal rate and regular rhythm.      Pulses: Normal pulses.      Heart " sounds: Normal heart sounds.   Pulmonary:      Effort: Pulmonary effort is normal.      Breath sounds: Normal breath sounds.   Abdominal:      General: Abdomen is flat. Bowel sounds are normal.      Palpations: Abdomen is soft.   Musculoskeletal:         General: Normal range of motion.   Skin:     General: Skin is warm and dry.   Neurological:      Mental Status: She is alert and oriented to person, place, and time.   Psychiatric:         Mood and Affect: Mood normal.       Inpatient Diagnostics:  Recent Results (from the past 24 hour(s))   Comprehensive metabolic panel    Collection Time: 06/25/23  8:57 PM   Result Value Ref Range    Sodium Level 141 136 - 145 mmol/L    Potassium Level 3.7 3.5 - 5.1 mmol/L    Chloride 95 (L) 98 - 107 mmol/L    Carbon Dioxide 34 (H) 23 - 31 mmol/L    Glucose Level 158 (H) 82 - 115 mg/dL    Blood Urea Nitrogen 43.0 (H) 9.8 - 20.1 mg/dL    Creatinine 2.28 (H) 0.55 - 1.02 mg/dL    Calcium Level Total 10.1 8.4 - 10.2 mg/dL    Protein Total 7.5 5.8 - 7.6 gm/dL    Albumin Level 3.3 (L) 3.4 - 4.8 g/dL    Globulin 4.2 (H) 2.4 - 3.5 gm/dL    Albumin/Globulin Ratio 0.8 (L) 1.1 - 2.0 ratio    Bilirubin Total 0.6 <=1.5 mg/dL    Alkaline Phosphatase 63 40 - 150 unit/L    Alanine Aminotransferase 10 0 - 55 unit/L    Aspartate Aminotransferase 22 5 - 34 unit/L    eGFR 22 mls/min/1.73/m2   CBC with Differential    Collection Time: 06/25/23  8:57 PM   Result Value Ref Range    WBC 5.76 4.50 - 11.50 x10(3)/mcL    RBC 3.86 (L) 4.20 - 5.40 x10(6)/mcL    Hgb 10.6 (L) 12.0 - 16.0 g/dL    Hct 33.4 (L) 37.0 - 47.0 %    MCV 86.5 80.0 - 94.0 fL    MCH 27.5 27.0 - 31.0 pg    MCHC 31.7 (L) 33.0 - 36.0 g/dL    RDW 13.3 11.5 - 17.0 %    Platelet 241 130 - 400 x10(3)/mcL    MPV 10.5 (H) 7.4 - 10.4 fL    Neut % 60.8 %    Lymph % 26.0 %    Mono % 10.4 %    Eos % 2.3 %    Basophil % 0.3 %    Lymph # 1.50 0.6 - 4.6 x10(3)/mcL    Neut # 3.50 2.1 - 9.2 x10(3)/mcL    Mono # 0.60 0.1 - 1.3 x10(3)/mcL    Eos # 0.13 0 -  0.9 x10(3)/mcL    Baso # 0.02 <=0.2 x10(3)/mcL    IG# 0.01 0 - 0.04 x10(3)/mcL    IG% 0.2 %   Troponin ISTAT    Collection Time: 06/25/23  9:04 PM   Result Value Ref Range    POC Cardiac Troponin I 0.31 (H) 0.00 - 0.08 ng/mL    Sample unknown    Brain natriuretic peptide    Collection Time: 06/25/23 10:45 PM   Result Value Ref Range    Natriuretic Peptide 224.0 (H) <=100.0 pg/mL   D dimer, quantitative    Collection Time: 06/25/23 10:45 PM   Result Value Ref Range    D-Dimer 16.90 (H) 0.00 - 0.50 ug/mL FEU   Troponin ISTAT    Collection Time: 06/25/23 11:28 PM   Result Value Ref Range    POC Cardiac Troponin I 0.92 (H) 0.00 - 0.08 ng/mL    Sample unknown    Troponin I    Collection Time: 06/26/23  4:03 AM   Result Value Ref Range    Troponin-I 2.066 (H) 0.000 - 0.045 ng/mL   Comprehensive metabolic panel    Collection Time: 06/26/23  4:03 AM   Result Value Ref Range    Sodium Level 139 136 - 145 mmol/L    Potassium Level 2.9 (L) 3.5 - 5.1 mmol/L    Chloride 96 (L) 98 - 107 mmol/L    Carbon Dioxide 31 23 - 31 mmol/L    Glucose Level 122 (H) 82 - 115 mg/dL    Blood Urea Nitrogen 42.5 (H) 9.8 - 20.1 mg/dL    Creatinine 1.95 (H) 0.55 - 1.02 mg/dL    Calcium Level Total 9.3 8.4 - 10.2 mg/dL    Protein Total 6.5 5.8 - 7.6 gm/dL    Albumin Level 3.1 (L) 3.4 - 4.8 g/dL    Globulin 3.4 2.4 - 3.5 gm/dL    Albumin/Globulin Ratio 0.9 (L) 1.1 - 2.0 ratio    Bilirubin Total 0.8 <=1.5 mg/dL    Alkaline Phosphatase 53 40 - 150 unit/L    Alanine Aminotransferase 11 0 - 55 unit/L    Aspartate Aminotransferase 23 5 - 34 unit/L    eGFR 27 mls/min/1.73/m2   CBC with Differential    Collection Time: 06/26/23  4:03 AM   Result Value Ref Range    WBC 4.57 4.50 - 11.50 x10(3)/mcL    RBC 3.48 (L) 4.20 - 5.40 x10(6)/mcL    Hgb 9.7 (L) 12.0 - 16.0 g/dL    Hct 29.9 (L) 37.0 - 47.0 %    MCV 85.9 80.0 - 94.0 fL    MCH 27.9 27.0 - 31.0 pg    MCHC 32.4 (L) 33.0 - 36.0 g/dL    RDW 13.3 11.5 - 17.0 %    Platelet 205 130 - 400 x10(3)/mcL    MPV 11.3  (H) 7.4 - 10.4 fL    Neut % 52.3 %    Lymph % 31.7 %    Mono % 12.3 %    Eos % 3.1 %    Basophil % 0.4 %    Lymph # 1.45 0.6 - 4.6 x10(3)/mcL    Neut # 2.39 2.1 - 9.2 x10(3)/mcL    Mono # 0.56 0.1 - 1.3 x10(3)/mcL    Eos # 0.14 0 - 0.9 x10(3)/mcL    Baso # 0.02 <=0.2 x10(3)/mcL    IG# 0.01 0 - 0.04 x10(3)/mcL    IG% 0.2 %    NRBC% 0.0 %   Magnesium    Collection Time: 06/26/23  4:03 AM   Result Value Ref Range    Magnesium Level 2.30 1.60 - 2.60 mg/dL   Phosphorus    Collection Time: 06/26/23  4:03 AM   Result Value Ref Range    Phosphorus Level 3.7 2.3 - 4.7 mg/dL   Troponin I    Collection Time: 06/26/23 11:41 AM   Result Value Ref Range    Troponin-I 1.512 (H) 0.000 - 0.045 ng/mL   Non-ST segment elevation MI  Patient currently volume depleted but patient symptoms similar to prestent/pre-CABG symptoms in the past.  At some point will need angiography for reevaluation but will hold off for time being due to elevated kidney functions.  If no improvement with hydration would consider renal consult  Add nitrates/lovenox  Monitor h/h    Anemia of chronic duration  Per primary    Chronic systolic heart failure status post BiV ICD per Dr. Montanez  Monitor volume replacement carefully  Continue hydralazine and beta-blocker    Status post MitraClip  Stable    Hyperlipidemia  On Repatha                 As above        Robi Macias  6/26/2023

## 2023-06-26 NOTE — NURSING
Nurses Note -- 4 Eyes      6/26/2023   12:16 PM      Skin assessed during: Admit      [x] No Altered Skin Integrity Present    []Prevention Measures Documented      [] Yes- Altered Skin Integrity Present or Discovered   [] LDA Added if Not in Epic (Describe Wound)   [] New Altered Skin Integrity was Present on Admit and Documented in LDA   [] Wound Image Taken    Wound Care Consulted? No    Attending Nurse:  Kahlil Pacheco RN     Second RN/Staff Member:  Machelle Stewart CNA

## 2023-06-26 NOTE — ED PROVIDER NOTES
"Encounter Date: 6/25/2023       History     Chief Complaint   Patient presents with    Dizziness     Pt c/o dizziness and nausea that started around 1500 today. Pt denies CP/ SOB. Pt reports her glucose was 179 PTA.      Patient is a 73-year-old white female with a past medical history of HFrEF, non-insulin-dependent diabetes, CKD, hypertension, CAD status post CABG, multiple stent placements, defibrillator and pacemaker who presented to the ER today due to dizziness.  Patient states she was sitting watching TV when it started.  Patient states it is worse with head position and better with rest.  Patient states she saw "floaters" during this event.  Patient states that she had no diplopia, dysarthria, dysphagia, focal deficits or changes in sensation.  Patient states it was not accompanied with chest pain, palpitations, shortness of breath or abdominal pain.  Patient denies any dysuria, hematuria, nausea, vomiting.  And did not take anything for her symptoms.  Patient denies any worsening orthopnea or lower extremity swelling.  Patient reports compliance with her medications.    Review of patient's allergies indicates:   Allergen Reactions    Statins-hmg-coa reductase inhibitors Hives and Swelling    Phenergan [promethazine] Hives    Wool Blisters    Iodine and iodide containing products Rash     Iodine and seafood allergy    Opioids - morphine analogues Nausea And Vomiting    Tetanus vaccines and toxoid Nausea And Vomiting    Zofran [ondansetron hcl] Nausea And Vomiting     Past Medical History:   Diagnosis Date    Chronic kidney disease, unspecified     Pt states stage 4    Diabetes     Diverticulitis     Essential (primary) hypertension     Heart attack     Hypercholesteremia     Hypothyroidism     Mixed hyperlipidemia     Nonrheumatic mitral (valve) insufficiency     Obesity, unspecified     Paroxysmal atrial fibrillation     Unspecified osteoarthritis, unspecified site      Past Surgical History:   Procedure " Laterality Date    APPENDECTOMY      CORONARY ARTERY BYPASS GRAFT      DEBRIDEMENT Right 12/21/2021    Muscle and/or fascia    HYSTERECTOMY      INSERTION OF PACEMAKER      KNEE SURGERY      MITRAL VALVE SURGERY      SHOULDER SURGERY      Stent in heart       TONSILLECTOMY       Family History   Problem Relation Age of Onset    Heart attack Mother     Heart attacks under age 50 Father     Cancer Sister      Social History     Tobacco Use    Smoking status: Never    Smokeless tobacco: Never   Substance Use Topics    Alcohol use: Not Currently    Drug use: Never     Review of Systems   Constitutional:  Negative for chills, fatigue and fever.   HENT:  Negative for congestion, sore throat and trouble swallowing.    Eyes:  Positive for visual disturbance. Negative for pain.   Respiratory:  Negative for cough, shortness of breath and wheezing.    Cardiovascular:  Negative for chest pain and palpitations.   Gastrointestinal:  Negative for abdominal pain, blood in stool, constipation, diarrhea, nausea and vomiting.   Genitourinary:  Negative for dysuria and hematuria.   Musculoskeletal:  Negative for back pain and myalgias.   Skin:  Negative for rash and wound.   Neurological:  Positive for dizziness. Negative for tremors, seizures, syncope, facial asymmetry, speech difficulty, weakness, light-headedness, numbness and headaches.   Psychiatric/Behavioral:  Negative for confusion. The patient is not nervous/anxious.      Physical Exam     Initial Vitals [06/25/23 2047]   BP Pulse Resp Temp SpO2   (!) 139/52 66 (!) 22 98.3 °F (36.8 °C) 100 %      MAP       --         Physical Exam    Nursing note and vitals reviewed.  Constitutional: She appears well-developed and well-nourished. She is not diaphoretic. No distress.   HENT:   Head: Normocephalic.   Right Ear: External ear normal.   Left Ear: External ear normal.   Nose: Nose normal.   Eyes: Conjunctivae and EOM are normal. Right eye exhibits no discharge. Left eye exhibits no  discharge. No scleral icterus.   Neck:   Normal range of motion.  Cardiovascular:  Normal rate and regular rhythm.     Exam reveals no gallop and no friction rub.       Murmur (Grade 2/6 systolic murmur heard best at the left parasternal border approximately intercostal 2.) heard.  Pulmonary/Chest: Breath sounds normal. No stridor. No respiratory distress. She has no wheezes. She has no rhonchi. She has no rales.   Abdominal: Abdomen is soft. She exhibits no distension. There is no abdominal tenderness. There is no rebound and no guarding.   Musculoskeletal:         General: Normal range of motion.      Cervical back: Normal range of motion.     Neurological: She is alert and oriented to person, place, and time. She has normal strength. No cranial nerve deficit or sensory deficit. GCS score is 15. GCS eye subscore is 4. GCS verbal subscore is 5. GCS motor subscore is 6.   CN II-XII intact bilaterally, PERRLA, EOMI, AO, no facial asymmetry noted, no abnormalities of vision loss or peripheral vision loss, strength 5/5 x 4 extremities, sensation intact throughout, no focal neurological deficits noted on exam.  Gait not assessed.  Negative Pronator drift bilaterally.       Skin: Skin is warm. No rash noted. No erythema.   Lower extremities: Well-healed skin graft noted to the right lower extremity.  Well-healed linear incisions over the knees bilaterally consistent with knee replacement.  No notable edema.  DP pulses appreciated and equal.   Psychiatric: She has a normal mood and affect. Her behavior is normal.       ED Course   Critical Care    Date/Time: 6/26/2023 12:07 AM  Performed by: Fan Rider MD  Authorized by: Fan Rider MD   Direct patient critical care time: 45 minutes  Additional history critical care time: 10 minutes  Ordering / reviewing critical care time: 10 minutes  Documentation critical care time: 10 minutes  Consulting other physicians critical care time: 10 minutes  Total critical care time  (exclusive of procedural time) : 85 minutes  Critical care was necessary to treat or prevent imminent or life-threatening deterioration of the following conditions: cardiac failure.  Critical care was time spent personally by me on the following activities: blood draw for specimens, development of treatment plan with patient or surrogate, discussions with consultants, evaluation of patient's response to treatment, examination of patient, obtaining history from patient or surrogate, ordering and performing treatments and interventions, ordering and review of laboratory studies, ordering and review of radiographic studies, pulse oximetry, re-evaluation of patient's condition and review of old charts.      Labs Reviewed   COMPREHENSIVE METABOLIC PANEL - Abnormal; Notable for the following components:       Result Value    Chloride 95 (*)     Carbon Dioxide 34 (*)     Glucose Level 158 (*)     Blood Urea Nitrogen 43.0 (*)     Creatinine 2.28 (*)     Albumin Level 3.3 (*)     Globulin 4.2 (*)     Albumin/Globulin Ratio 0.8 (*)     All other components within normal limits   CBC WITH DIFFERENTIAL - Abnormal; Notable for the following components:    RBC 3.86 (*)     Hgb 10.6 (*)     Hct 33.4 (*)     MCHC 31.7 (*)     MPV 10.5 (*)     All other components within normal limits   TROPONIN ISTAT - Abnormal; Notable for the following components:    POC Cardiac Troponin I 0.31 (*)     All other components within normal limits   B-TYPE NATRIURETIC PEPTIDE - Abnormal; Notable for the following components:    Natriuretic Peptide 224.0 (*)     All other components within normal limits   D DIMER, QUANTITATIVE - Abnormal; Notable for the following components:    D-Dimer 16.90 (*)     All other components within normal limits   TROPONIN ISTAT - Abnormal; Notable for the following components:    POC Cardiac Troponin I 0.92 (*)     All other components within normal limits   CBC W/ AUTO DIFFERENTIAL    Narrative:     The following orders  were created for panel order CBC auto differential.  Procedure                               Abnormality         Status                     ---------                               -----------         ------                     CBC with Differential[[id number]]        Abnormal            Final result                 Please view results for these tests on the individual orders.     EKG Readings: (Independently Interpreted)   Initial Reading: No STEMI. Previous EKG: Compared with most recent EKG Previous EKG Date: 5/25/2023. Rhythm: Paced Rhythm. Heart Rate: 67. Ectopy: No Ectopy. ST Segment Elevation: V1 and V2.   This EKG was compared to that on 05/25/23 and also showed ST segment abnormalities   ECG Results              EKG 12-lead (In process)  Result time 06/26/23 03:19:29      In process by Interface, Lab In [facility] (06/26/23 03:19:29)                   Narrative:    Test Reason : R42,    Vent. Rate : 067 BPM     Atrial Rate : 061 BPM     P-R Int : 100 ms          QRS Dur : 172 ms      QT Int : 480 ms       P-R-T Axes : 000 -22 172 degrees     QTc Int : 507 ms    AV dual-paced rhythm  Abnormal ECG  When compared with ECG of 25-MAY-2023 12:49,  Electronic ventricular pacemaker has replaced Atrial fibrillation  Vent. rate has decreased BY  58 BPM    Referred By: [email or web address]   SELF           Confirmed By:                                   Imaging Results              X-Ray Chest 1 View (Final result)  Result time 06/25/23 22:44:16      Final result by Wily Kearns MD (06/25/23 22:44:16)                   Impression:      NO ACUTE CARDIOPULMONARY PROCESS IDENTIFIED.      Electronically signed by: Wily Kearns  Date:    06/25/2023  Time:    22:44               Narrative:    EXAMINATION:  XR CHEST 1 VIEW    CLINICAL HISTORY:  nstemi;    TECHNIQUE:  One    COMPARISON:  April 11, 2020.    FINDINGS:  Cardiopericardial silhouette enlarged appearance similar.  Sternotomy changes.  Cardiac device electrodes terminate within  the right atrium and the right ventricle.  No acute dense focal or segmental consolidation, congestive process, pleural effusions or pneumothorax.  Right shoulder arthroplasty.                                       CT Head Without Contrast (Preliminary result)  Result time 06/25/23 22:39:26      Preliminary result by Ubaldo Owusu MD (06/25/23 22:39:26)                   Narrative:    START OF REPORT:  Technique: CT of the head was performed without intravenous contrast with axial as well as coronal and sagittal images.    Comparison: None.    Dosage Information: Automated Exposure Control was utilized 1105.1 mGy.cm.    Clinical history: Dizziness (Pt c/o dizziness and nausea that started around 1500 today. Pt denies CP/ SOB. Pt reports her glucose was 179 PTA.    Findings:  Hemorrhage: No acute intracranial hemorrhage is seen.  CSF spaces: The ventricles, sulci and basal cisterns all appear mildly prominent consistent with global cerebral atrophy.  Brain parenchyma: There is preservation of the grey white junction throughout. Mild microvascular change is seen in portions of the periventricular and deep white matter tracts.  Cerebellum: Unremarkable.  Sella and skull base: The sella appears to be within normal limits for age.  Herniation: None.  Intracranial calcifications: Incidental note is made of bilateral choroid plexus calcification. Incidental note is made of some pineal region calcification.  Calvarium: No acute linear or depressed skull fracture is seen.    Maxillofacial Structures:  Paranasal sinuses: There is some mucoperiosteal thickening in the left maxillary sinuses. The rest of the paranasal sinuses appear clear.  Orbits: The orbits appear unremarkable.  Zygomatic arches: The zygomatic arches are intact and unremarkable.  Temporal bones and mastoids: The temporal bones and mastoids appear unremarkable.  TMJ: The mandibular condyles appear normally placed with respect to the mandibular  fossa.      Impression:  1. No acute intracranial process identified. Details and other findings as noted above.                                         Medications   0.9%  NaCl infusion (500 mLs Intravenous New Bag 6/25/23 2251)   aspirin chewable tablet 162 mg (162 mg Oral Given 6/25/23 2247)   enoxaparin injection 70 mg (70 mg Subcutaneous Given 6/26/23 0007)     Medical Decision Making:   Initial Assessment:   Overall no acute distress 73-year-old female  Differential Diagnosis:   CVA, TIA, positional vertigo, orthostatic hypotension, electrolyte disturbance, UTI, ACS  Clinical Tests:   Lab Tests: Ordered and Reviewed  The following lab test(s) were unremarkable: CBC, CMP, Urinalysis and Troponin  Radiological Study: Ordered and Reviewed  Medical Tests: Ordered and Reviewed  ED Management:  Vital signs stable patient is afebrile   Neuro exam reassuring as there are no deficits  CT head performed due to patient stating she saw floaters   EKG shows a paced rhythm with some ST segment changes were seen last month as well   Troponin noted to be elevated per chart reviewed appears patient does not carry a elevated baseline troponin despite her CKD   Aspirin given  Patient not on any anticoagulation but is on high-intensity statin   [facility] consulted  Creatinine noted to be above baseline thus a small bolus of 250 cc of fluids were given   Presume this to be a type 2 NSTEMI however can not rule out a true ischemic cause with this patient's multitude of cardiac comorbidities  Single dose of Lovenox given based on CIS recommendation  Troponin increase greater than 2 times thus the plan per CIS recommendation was to transfer for cardiac blood facility   CTH negative  D dimer positive  CTPE contraindicated  ETA for VQ scan several hours per staff  Pt was accepted for transfer to [facility] via Dr. Weir                            Clinical Impression:   Final diagnoses:  [R42] Dizziness  [I21.4] NSTEMI (non-ST elevated myocardial  infarction) (Primary)        ED Disposition Condition    Transfer to Another Facility Serious                Fan Rider MD  06/26/23 3039

## 2023-06-26 NOTE — ED PROVIDER NOTES
Encounter Date: 6/26/2023    SCRIBE #1 NOTE: I, Leonidas King, am scribing for, and in the presence of,  Bella Castle MD. I have scribed the following portions of the note - Other sections scribed: HPI, ROS, PE.     History     Chief Complaint   Patient presents with    NSTEMI     NSTEMI from [place]. C/o of dizziness on arrival, denies CP     74 y/o female with PMHx of DM, obesity, CKD, MI, A-fib,  HTN, HLD, Hypercholesteremia, and hypothyroidism presents to ED as a transfer from [place] for NSTEMI. In ED pt c/o dizziness when moving around. She reports feeling lightheaded and near syncopal. She reports that she feels fine laying down in ED as long as she's not moving. She reports having no appetite for months, went from 230lbs to 170lbs. She statesbp at 120/60 today. She denies any leg pain or swelling. Denies any recent travel or trauma. Pt notes having an onion and seafood allergy.     Nephrology: Dr. Alexander Flor MD     The history is provided by the patient. No  was used.   Review of patient's allergies indicates:   Allergen Reactions    Statins-hmg-coa reductase inhibitors Hives and Swelling    Phenergan [promethazine] Hives    Wool Blisters    Iodine and iodide containing products Rash     Iodine and seafood allergy    Opioids - morphine analogues Nausea And Vomiting    Tetanus vaccines and toxoid Nausea And Vomiting    Zofran [ondansetron hcl] Nausea And Vomiting     Past Medical History:   Diagnosis Date    Chronic kidney disease, unspecified     Pt states stage 4    Diabetes     Diverticulitis     Essential (primary) hypertension     Heart attack     Hypercholesteremia     Hypothyroidism     Mixed hyperlipidemia     Nonrheumatic mitral (valve) insufficiency     Obesity, unspecified     Paroxysmal atrial fibrillation     Unspecified osteoarthritis, unspecified site      Past Surgical History:   Procedure Laterality Date    APPENDECTOMY      CORONARY ARTERY BYPASS  GRAFT      DEBRIDEMENT Right 12/21/2021    Muscle and/or fascia    HYSTERECTOMY      INSERTION OF PACEMAKER      KNEE SURGERY      MITRAL VALVE SURGERY      SHOULDER SURGERY      Stent in heart       TONSILLECTOMY       Family History   Problem Relation Age of Onset    Heart attack Mother     Heart attacks under age 50 Father     Cancer Sister      Social History     Tobacco Use    Smoking status: Never    Smokeless tobacco: Never   Substance Use Topics    Alcohol use: Not Currently    Drug use: Never     Review of Systems   Constitutional:  Positive for appetite change and unexpected weight change. Negative for chills, diaphoresis and fever.   HENT:  Negative for congestion, ear pain, sinus pain and sore throat.    Eyes:  Negative for pain, discharge and visual disturbance.   Respiratory:  Negative for cough, shortness of breath, wheezing and stridor.    Cardiovascular:  Negative for chest pain, palpitations and leg swelling.   Gastrointestinal:  Negative for abdominal pain, constipation, diarrhea, nausea, rectal pain and vomiting.   Genitourinary:  Negative for dysuria and hematuria.   Musculoskeletal:  Negative for back pain and myalgias.        Denies leg swelling    Skin:  Negative for rash.   Neurological:  Positive for dizziness and light-headedness. Negative for syncope, numbness and headaches.   Hematological: Negative.    Psychiatric/Behavioral: Negative.     All other systems reviewed and are negative.    Physical Exam     Initial Vitals [06/26/23 0130]   BP Pulse Resp Temp SpO2   (!) 157/63 76 19 -- 98 %      MAP       --         Physical Exam    Nursing note and vitals reviewed.  Constitutional: She appears well-developed and well-nourished. She is not diaphoretic. No distress.   HENT:   Head: Normocephalic and atraumatic.   Nose: Nose normal.   Mouth/Throat: Oropharynx is clear and moist.   Eyes: Conjunctivae and EOM are normal. Pupils are equal, round, and reactive to light.   Neck: Trachea normal.  Neck supple.   Normal range of motion.  Cardiovascular:  Normal rate, regular rhythm, normal heart sounds and intact distal pulses.           No murmur heard.  Pulmonary/Chest: Breath sounds normal. No respiratory distress. She has no wheezes. She has no rhonchi. She has no rales. She exhibits no tenderness.   Pacemaker to Left chest wall.    Abdominal: Abdomen is soft. Bowel sounds are normal. She exhibits no distension and no mass. There is no abdominal tenderness. There is no rebound and no guarding.   Musculoskeletal:         General: No tenderness or edema. Normal range of motion.      Cervical back: Normal range of motion and neck supple.      Lumbar back: Normal. Normal range of motion.     Neurological: She is alert and oriented to person, place, and time. She has normal strength. No cranial nerve deficit or sensory deficit.   Skin: Skin is warm and dry. Capillary refill takes less than 2 seconds. No abscess noted. No erythema. No pallor.   Psychiatric: She has a normal mood and affect. Her behavior is normal. Judgment and thought content normal.       ED Course   Procedures  Labs Reviewed   TROPONIN I   CBC W/ AUTO DIFFERENTIAL    Narrative:     The following orders were created for panel order CBC auto differential.  Procedure                               Abnormality         Status                     ---------                               -----------         ------                     CBC with Differential[[id number]]                                                         Please view results for these tests on the individual orders.   COMPREHENSIVE METABOLIC PANEL   CBC WITH DIFFERENTIAL          Imaging Results    None          Medications   0.9%  NaCl infusion (1,000 mLs Intravenous New Bag 6/26/23 0200)     Medical Decision Making:   History:   Old Medical Records: I decided to obtain old medical records.  Old Records Summarized: records from another hospital.  Initial Assessment:   See  hpi  Independently Interpreted Test(s):   I have ordered and independently interpreted EKG Reading(s) - see prior notes  Clinical Tests:   Lab Tests: Ordered and Reviewed  Radiological Study: Reviewed  Medical Tests: Ordered and Reviewed  Other:   I have discussed this case with another health care provider.        Scribe Attestation:   Scribe #1: I performed the above scribed service and the documentation accurately describes the services I performed. I attest to the accuracy of the note.  Comments: Attending:   Physician Attestation Statement for Scribe #1: IBella MD, personally performed the services described in this documentation. All medical record entries made by the scribe were at my direction and in my presence.  I have reviewed the chart and agree that the record reflects my personal performance and is accurate and complete.        Attending Attestation:           Physician Attestation for Scribe:  Physician Attestation Statement for Scribe #1: IBella MD, reviewed documentation, as scribed by Leonidas King in my presence, and it is both accurate and complete.       Medical Decision Making  Differential diagnosis includes but is not limited to dehydration, light headedness, Type I NSTEMI, and Type II NSTEMI.   Cbc, cmp, trop ordered  Reviewed labs from sending facility  S/s most consistent with orthostatic lightheadedness, pt has vincent, possibly ivvd. Does not appear volume overloaded. Has no chest pain or shortness of breath. Endorses recent weight loss which may be contributing. She had a d dimer obtained at [facility]. She does not have s/s dvt or pe but this was significantly elevated therefore vq scan ordered as well as dvt us. She already received full dose anticoagulation at outside facility. Discussed with osptialist who agrees with admission    Problems Addressed:  VINCENT (acute kidney injury): acute illness or injury that poses a threat to life or bodily functions  NSTEMI (non-ST  elevated myocardial infarction): acute illness or injury that poses a threat to life or bodily functions  Orthostatic dizziness: acute illness or injury that poses a threat to life or bodily functions  Positive D dimer: undiagnosed new problem with uncertain prognosis    Amount and/or Complexity of Data Reviewed  External Data Reviewed: labs and notes.  Labs: ordered.  ECG/medicine tests: ordered and independent interpretation performed. Decision-making details documented in ED Course.    Risk  OTC drugs.  Prescription drug management.  Decision regarding hospitalization.    Critical Care  Total time providing critical care: 12 minutes         ED Course as of 06/26/23 0300 Mon Jun 26, 2023   0149 Reviewed her previous labs and she does have an VINCENT.  She was no signs and symptoms of DVT or PE however her D-dimer was elevated which his very nonspecific.  Her symptoms are not consistent with a P.  She received full-dose anticoagulation and will defer further evaluation other than vascular ultrasounds to the admitting team as she was hemodynamically stable.  Suspect orthostatic hypotension as her cause of her generalized weakness and near-syncope. [BS]   0152 Paged hospitalist.  [MM]      ED Course User Index  [BS] Bella Castle MD  [MM] Griselda Bragg                 Clinical Impression:   Final diagnoses:  [R79.89] Positive D dimer  [I21.4] NSTEMI (non-ST elevated myocardial infarction) (Primary)  [N17.9] VINCENT (acute kidney injury)  [R42] Orthostatic dizziness        ED Disposition Condition    Observation Stable                Bella Castle MD  06/26/23 0300

## 2023-06-26 NOTE — CONSULTS
[facility] - Emergency Dept  Cardiology  Consult Note    Patient Name: Rahel Bagley  MRN: [id number]  Admission Date: 6/25/2023  Hospital Length of Stay: 0 days  Code Status: No Order   Attending Provider: Fan Rider MD   Consulting Provider: Lane Méndez NP  Primary Care Physician: Marla Taveras MD  Principal Problem:<principal problem not specified>    Patient information was obtained from patient, ER records, and primary team.     Consults  Subjective:     Chief Complaint:    Tele cardiology consult   Location:  [facility] emergency room   Consulting provider:  Dr. Rider  Duration:  Greater than 30 minutes including review of medical records, patient and provider interview    HPI:   73-year-old female that is followed by Dr. Seals.  She has a known history of coronary artery disease had coronary artery bypass grafting x4 in January of 2020 and she tells me she is had to have 5 stents since that bypass.  She had an AICD implanted in March of 2023.  She has history of paroxysmal atrial fibrillation also.    Today she woke up was not too hungry did not feel like eating and around 1:00 p.m. she fix some eggs.  She got up became dizzy and weak.  This dizziness persisted throughout the afternoon and was worse upon standing.  She decided to come into the ER for evaluation.  An EKG done upon arrival showed a ventricular paced rhythm.  Lab work showed underlying chronic kidney disease that may be worse than her previous.  Troponin was minimally elevated at 0.31.  She denies any overt chest discomfort or shortness of breath.  Prior to stenting she was having some dyspnea on exertion was her main symptom.  And again she is not complaining of any of that at this time mainly her complaint is just dizziness.    Currently she is hemodynamically stable without chest discomfort shortness of breath or ongoing dizziness.  She did receive aspirin and a dose of Lovenox  Currently on IV fluids    She  also checked her sugar and was in an acceptable range ( slightly elevated ).        Past Medical History:   Diagnosis Date    Chronic kidney disease, unspecified     Pt states stage 4    Diabetes     Diverticulitis     Essential (primary) hypertension     Heart attack     Hypercholesteremia     Hypothyroidism     Mixed hyperlipidemia     Nonrheumatic mitral (valve) insufficiency     Obesity, unspecified     Paroxysmal atrial fibrillation     Unspecified osteoarthritis, unspecified site        Past Surgical History:   Procedure Laterality Date    APPENDECTOMY      CORONARY ARTERY BYPASS GRAFT      DEBRIDEMENT Right 12/21/2021    Muscle and/or fascia    HYSTERECTOMY      INSERTION OF PACEMAKER      KNEE SURGERY      MITRAL VALVE SURGERY      SHOULDER SURGERY      Stent in heart       TONSILLECTOMY         Review of patient's allergies indicates:   Allergen Reactions    Statins-hmg-coa reductase inhibitors Hives and Swelling    Phenergan [promethazine] Hives    Wool Blisters    Iodine and iodide containing products Rash     Iodine and seafood allergy    Opioids - morphine analogues Nausea And Vomiting    Tetanus vaccines and toxoid Nausea And Vomiting    Zofran [ondansetron hcl] Nausea And Vomiting       Current Facility-Administered Medications on File Prior to Encounter   Medication    epoetin martin-epbx injection 10,000 Units    epoetin martin-epbx injection 10,000 Units    epoetin martin-epbx injection 10,000 Units    epoetin martin-epbx injection 10,000 Units     Current Outpatient Medications on File Prior to Encounter   Medication Sig    carvediloL (COREG) 6.25 MG tablet     fenofibrate micronized (LOFIBRA) 200 MG Cap Take 200 mg by mouth.    hydrALAZINE (APRESOLINE) 25 MG tablet Take 25 mg by mouth 2 (two) times daily.    JANUVIA 25 mg Tab Take 25 mg by mouth Daily.    levothyroxine (TIROSINT) 88 mcg Cap Take 1 capsule by mouth Daily.    REPATHA SURECLICK 140 mg/mL PnIj     torsemide (DEMADEX) 20 MG Tab Take 40 mg by  mouth.     Family History       Problem Relation (Age of Onset)    Cancer Sister    Heart attack Mother    Heart attacks under age 50 Father          Tobacco Use    Smoking status: Never    Smokeless tobacco: Never   Substance and Sexual Activity    Alcohol use: Not Currently    Drug use: Never    Sexual activity: Not on file     Review of Systems   Constitutional: Positive for decreased appetite and malaise/fatigue.   HENT: Negative.     Eyes: Negative.    Cardiovascular:  Positive for near-syncope. Negative for chest pain, dyspnea on exertion, irregular heartbeat, leg swelling, orthopnea and paroxysmal nocturnal dyspnea.   Respiratory: Negative.  Negative for shortness of breath.    Endocrine: Negative.    Musculoskeletal: Negative.    Gastrointestinal:  Positive for constipation.   Genitourinary: Negative.    Psychiatric/Behavioral: Negative.     Objective:     Vital Signs (Most Recent):  Temp: 98.3 °F (36.8 °C) (06/25/23 2047)  Pulse: 66 (06/25/23 2047)  Resp: (!) 22 (06/25/23 2047)  BP: (!) 139/52 (06/25/23 2047)  SpO2: 100 % (06/25/23 2047) Vital Signs (24h Range):  Temp:  [98.3 °F (36.8 °C)] 98.3 °F (36.8 °C)  Pulse:  [66] 66  Resp:  [22] 22  SpO2:  [100 %] 100 %  BP: (139)/(52) 139/52     Weight: 77.1 kg (170 lb)  Body mass index is 31.09 kg/m².    SpO2: 100 %       No intake or output data in the 24 hours ending 06/25/23 2310    Lines/Drains/Airways       Peripheral Intravenous Line  Duration                  Peripheral IV - Single Lumen 07/28/22 1330 24 G Left Hand 332 days         Peripheral IV - Single Lumen 06/25/23 2200 20 G Left Antecubital <1 day                    Physical Exam  Constitutional:       Appearance: She is obese.   HENT:      Nose: Nose normal.      Mouth/Throat:      Mouth: Mucous membranes are moist.   Eyes:      Extraocular Movements: Extraocular movements intact.      Pupils: Pupils are equal, round, and reactive to light.   Cardiovascular:      Rate and Rhythm: Normal rate and  regular rhythm.   Pulmonary:      Effort: Pulmonary effort is normal.      Breath sounds: Normal breath sounds.   Abdominal:      General: Abdomen is flat. Bowel sounds are normal.   Musculoskeletal:         General: Normal range of motion.      Cervical back: Normal range of motion.   Skin:     General: Skin is warm and dry.   Neurological:      General: No focal deficit present.      Mental Status: She is alert and oriented to person, place, and time.   Psychiatric:         Mood and Affect: Mood normal.       Significant Labs:   Recent Lab Results         06/25/23 2245 06/25/23 2104 06/25/23 2057        Albumin/Globulin Ratio     0.8       Albumin     3.3       Alkaline Phosphatase     63       ALT     10       AST     22       Baso #     0.02       Basophil %     0.3       BILIRUBIN TOTAL     0.6       .0           BUN     43.0       Calcium     10.1       Chloride     95       CO2     34       Creatinine     2.28       D-Dimer 16.90  Comment: D-dimer values of less than 0.5ug/mL FEU in adult patients with a clinically low pre-test probability of developing a DVT yield  a 99% negative predictive value.  D-dimer increases naturally with age, therefore the negative predictive value in patients older than 80 is 21 - 31%.    D-Dimer testing at [facility] and [facility] is used as an aid in the diagnosis of VTE/PE. The D-Dimer test must be used with one or more additional tests such as imaging studies to evaluate VTE/PE           eGFR     22       Eos #     0.13       Eosinophil %     2.3       Globulin, Total     4.2       Glucose     158       Hematocrit     33.4       Hemoglobin     10.6       Immature Grans (Abs)     0.01       Immature Granulocytes     0.2       Lymph #     1.50       LYMPH %     26.0       MCH     27.5       MCHC     31.7       MCV     86.5       Mono #     0.60       Mono %     10.4       MPV     10.5       Neut #     3.50       Neut %      60.8       Platelets     241       POC Cardiac Troponin I   0.31         Potassium     3.7       PROTEIN TOTAL     7.5       RBC     3.86       RDW     13.3       Sample   unknown  Comment: A single negative troponin is insufficient to rule out myocardial infarction.  The use of a serial sampling protocol is recommended practice. Correlate results with reference intervals established for methodology used. Point of care and core laboratory   troponin results are not interchangeable.           Sodium     141       WBC     5.76               Significant Imaging:   EKG      CXR: No acute CardioPulmonary process     Assessment and Plan:     Impression:   Dizziness with some degree of dehydration  -on furosemide 40 mg daily at home   Elevated troponin with history of CAD ( Dr Seals)  -coronary artery bypass grafting 2020   -PCI and stenting x5 post bypass   AICD implantation March 2023  Reported history of paroxysmal atrial fibrillation   Chronic kidney disease stage 4 followed by Dr. Flor  DM    Plan  She certainly has a high risk history with her coronary artery bypass grafting and PCIs post bypass.  Currently she denies any overt chest discomfort or dyspnea upon exertion   She has no acute EKG changes however her rhythm is ventricular paced    Should go ahead and get a 2 hour troponin if that is about the same she could be admitted to [facility] for observation and we will follow with her in the morning.  However, if she has a bump in the troponin or has symptoms or chest discomfort or shortness of breath she should be transferred to the services of Dr. Seals at [facility] or [facility]    Aspirin 81 daily   Lovenox mg per kg subQ x1   Resume home dose beta-blocker  IV fluids   Monitor volume status with history of CHF and CKD  Continue cycle cardiac biomarkers   Tele monitoring     Thank you for the consultation should you have any questions concerns please do not hesitate to  call.      There are no hospital problems to display for this patient.      VTE Risk Mitigation (From admission, onward)      None            Thank you for your consult.     Lane Méndez NP  Cardiology   [facility] - Emergency Dept

## 2023-06-26 NOTE — PLAN OF CARE
06/26/23 1534   Discharge Assessment   Assessment Type Discharge Planning Assessment   Confirmed/corrected address, phone number and insurance Yes   Confirmed Demographics Correct on Facesheet   Source of Information patient   Communicated RONALD with patient/caregiver Date not available/Unable to determine   Reason For Admission NSTEMI   People in Home alone   Do you expect to return to your current living situation? Yes   Do you have help at home or someone to help you manage your care at home? No   Prior to hospitilization cognitive status: Unable to Assess   Current cognitive status: Alert/Oriented   Home Accessibility stairs to enter home   Number of Stairs, Main Entrance five   Home Layout Able to live on 1st floor   Equipment Currently Used at Home none   Patient currently being followed by outpatient case management? No   Do you currently have service(s) that help you manage your care at home? No   Do you take prescription medications? Yes   Do you have prescription coverage? Yes   Coverage Medicare   Do you have any problems affording any of your prescribed medications? No   Who is going to help you get home at discharge? Mary Kate Nath (Daughter) [phone]   How do you get to doctors appointments? car, drives self   Are you on dialysis? No   Do you take coumadin? No   Discharge Plan A Home   Discharge Plan B Home   DME Needed Upon Discharge  none   Discharge Plan discussed with: Patient   Transition of Care Barriers None   Physical Activity   On average, how many days per week do you engage in moderate to strenuous exercise (like a brisk walk)? 3 days   On average, how many minutes do you engage in exercise at this level? 30 min   Financial Resource Strain   How hard is it for you to pay for the very basics like food, housing, medical care, and heating? Not hard   Housing Stability   In the last 12 months, was there a time when you were not able to pay the mortgage or rent on time? N   In the last 12  months, how many places have you lived? 1   In the last 12 months, was there a time when you did not have a steady place to sleep or slept in a shelter (including now)? N   Transportation Needs   In the past 12 months, has lack of transportation kept you from medical appointments or from getting medications? no   In the past 12 months, has lack of transportation kept you from meetings, work, or from getting things needed for daily living? No   Food Insecurity   Within the past 12 months, you worried that your food would run out before you got the money to buy more. Never true   Within the past 12 months, the food you bought just didn't last and you didn't have money to get more. Never true   Stress   Do you feel stress - tense, restless, nervous, or anxious, or unable to sleep at night because your mind is troubled all the time - these days? Not at all   Social Connections   In a typical week, how many times do you talk on the phone with family, friends, or neighbors? More than 3   How often do you get together with friends or relatives? More than 3   How often do you attend Episcopalian or Worship services? Never   Do you belong to any clubs or organizations such as Episcopalian groups, unions, fraternal or athletic groups, or school groups? No   How often do you attend meetings of the clubs or organizations you belong to? Never   Are you , , , , never , or living with a partner?    Alcohol Use   Q1: How often do you have a drink containing alcohol? Never   Q2: How many drinks containing alcohol do you have on a typical day when you are drinking? None   Q3: How often do you have six or more drinks on one occasion? Never     Patient lives alone but daughter and grandson lives on her property. Patient still drives and takes care of herself. Daughter Ms Mary Kate Nath will be added to her facesheet for point of contact. Discharge plan for now is to be discharged home. Will follow up  at a later time to see if recommendations have been made.

## 2023-06-27 LAB
ALBUMIN SERPL-MCNC: 3.1 G/DL (ref 3.4–4.8)
ALBUMIN/GLOB SERPL: 0.9 RATIO (ref 1.1–2)
ALP SERPL-CCNC: 54 UNIT/L (ref 40–150)
ALT SERPL-CCNC: 9 UNIT/L (ref 0–55)
AST SERPL-CCNC: 24 UNIT/L (ref 5–34)
BASOPHILS # BLD AUTO: 0.03 X10(3)/MCL
BASOPHILS NFR BLD AUTO: 0.6 %
BILIRUBIN DIRECT+TOT PNL SERPL-MCNC: 0.9 MG/DL
BUN SERPL-MCNC: 28.2 MG/DL (ref 9.8–20.1)
CALCIUM SERPL-MCNC: 9.8 MG/DL (ref 8.4–10.2)
CHLORIDE SERPL-SCNC: 107 MMOL/L (ref 98–107)
CO2 SERPL-SCNC: 24 MMOL/L (ref 23–31)
CREAT SERPL-MCNC: 1.33 MG/DL (ref 0.55–1.02)
EOSINOPHIL # BLD AUTO: 0.15 X10(3)/MCL (ref 0–0.9)
EOSINOPHIL NFR BLD AUTO: 3.1 %
ERYTHROCYTE [DISTWIDTH] IN BLOOD BY AUTOMATED COUNT: 13.2 % (ref 11.5–17)
GFR SERPLBLD CREATININE-BSD FMLA CKD-EPI: 42 MLS/MIN/1.73/M2
GLOBULIN SER-MCNC: 3.6 GM/DL (ref 2.4–3.5)
GLUCOSE SERPL-MCNC: 118 MG/DL (ref 82–115)
HCT VFR BLD AUTO: 31.9 % (ref 37–47)
HGB BLD-MCNC: 10.5 G/DL (ref 12–16)
IMM GRANULOCYTES # BLD AUTO: 0.02 X10(3)/MCL (ref 0–0.04)
IMM GRANULOCYTES NFR BLD AUTO: 0.4 %
LYMPHOCYTES # BLD AUTO: 0.83 X10(3)/MCL (ref 0.6–4.6)
LYMPHOCYTES NFR BLD AUTO: 17.3 %
MCH RBC QN AUTO: 28 PG (ref 27–31)
MCHC RBC AUTO-ENTMCNC: 32.9 G/DL (ref 33–36)
MCV RBC AUTO: 85.1 FL (ref 80–94)
MONOCYTES # BLD AUTO: 0.51 X10(3)/MCL (ref 0.1–1.3)
MONOCYTES NFR BLD AUTO: 10.6 %
NEUTROPHILS # BLD AUTO: 3.26 X10(3)/MCL (ref 2.1–9.2)
NEUTROPHILS NFR BLD AUTO: 68 %
NRBC BLD AUTO-RTO: 0 %
PLATELET # BLD AUTO: 218 X10(3)/MCL (ref 130–400)
PMV BLD AUTO: 11 FL (ref 7.4–10.4)
POCT GLUCOSE: 116 MG/DL (ref 70–110)
POCT GLUCOSE: 187 MG/DL (ref 70–110)
POTASSIUM SERPL-SCNC: 3.8 MMOL/L (ref 3.5–5.1)
PROT SERPL-MCNC: 6.7 GM/DL (ref 5.8–7.6)
RBC # BLD AUTO: 3.75 X10(6)/MCL (ref 4.2–5.4)
SODIUM SERPL-SCNC: 142 MMOL/L (ref 136–145)
TROPONIN I SERPL-MCNC: 0.73 NG/ML (ref 0–0.04)
WBC # SPEC AUTO: 4.8 X10(3)/MCL (ref 4.5–11.5)

## 2023-06-27 PROCEDURE — 63600175 PHARM REV CODE 636 W HCPCS: Performed by: STUDENT IN AN ORGANIZED HEALTH CARE EDUCATION/TRAINING PROGRAM

## 2023-06-27 PROCEDURE — 25000003 PHARM REV CODE 250: Performed by: NURSE PRACTITIONER

## 2023-06-27 PROCEDURE — 80053 COMPREHEN METABOLIC PANEL: CPT | Performed by: INTERNAL MEDICINE

## 2023-06-27 PROCEDURE — 84484 ASSAY OF TROPONIN QUANT: CPT | Performed by: EMERGENCY MEDICINE

## 2023-06-27 PROCEDURE — 93010 ELECTROCARDIOGRAM REPORT: CPT | Mod: ,,, | Performed by: INTERNAL MEDICINE

## 2023-06-27 PROCEDURE — 21400001 HC TELEMETRY ROOM

## 2023-06-27 PROCEDURE — 25000003 PHARM REV CODE 250: Performed by: INTERNAL MEDICINE

## 2023-06-27 PROCEDURE — 63600175 PHARM REV CODE 636 W HCPCS: Performed by: NURSE PRACTITIONER

## 2023-06-27 PROCEDURE — 63600175 PHARM REV CODE 636 W HCPCS: Performed by: INTERNAL MEDICINE

## 2023-06-27 PROCEDURE — 93005 ELECTROCARDIOGRAM TRACING: CPT

## 2023-06-27 PROCEDURE — 85025 COMPLETE CBC W/AUTO DIFF WBC: CPT | Performed by: INTERNAL MEDICINE

## 2023-06-27 PROCEDURE — 93010 EKG 12-LEAD: ICD-10-PCS | Mod: ,,, | Performed by: INTERNAL MEDICINE

## 2023-06-27 PROCEDURE — 25000003 PHARM REV CODE 250: Performed by: STUDENT IN AN ORGANIZED HEALTH CARE EDUCATION/TRAINING PROGRAM

## 2023-06-27 RX ORDER — DIPHENHYDRAMINE HCL 25 MG
25 CAPSULE ORAL EVERY 6 HOURS
Status: COMPLETED | OUTPATIENT
Start: 2023-06-27 | End: 2023-06-28

## 2023-06-27 RX ORDER — FAMOTIDINE 20 MG/1
20 TABLET, FILM COATED ORAL 2 TIMES DAILY
Status: COMPLETED | OUTPATIENT
Start: 2023-06-27 | End: 2023-06-27

## 2023-06-27 RX ORDER — DEXAMETHASONE SODIUM PHOSPHATE 4 MG/ML
10 INJECTION, SOLUTION INTRA-ARTICULAR; INTRALESIONAL; INTRAMUSCULAR; INTRAVENOUS; SOFT TISSUE EVERY 8 HOURS
Status: COMPLETED | OUTPATIENT
Start: 2023-06-27 | End: 2023-06-28

## 2023-06-27 RX ADMIN — FAMOTIDINE 20 MG: 20 TABLET, FILM COATED ORAL at 01:06

## 2023-06-27 RX ADMIN — CARVEDILOL 25 MG: 12.5 TABLET, FILM COATED ORAL at 09:06

## 2023-06-27 RX ADMIN — DEXAMETHASONE SODIUM PHOSPHATE 10 MG: 4 INJECTION, SOLUTION INTRA-ARTICULAR; INTRALESIONAL; INTRAMUSCULAR; INTRAVENOUS; SOFT TISSUE at 09:06

## 2023-06-27 RX ADMIN — ACETAMINOPHEN 650 MG: 325 TABLET, FILM COATED ORAL at 04:06

## 2023-06-27 RX ADMIN — DIPHENHYDRAMINE HYDROCHLORIDE 25 MG: 25 CAPSULE ORAL at 09:06

## 2023-06-27 RX ADMIN — ENOXAPARIN SODIUM 80 MG: 80 INJECTION SUBCUTANEOUS at 05:06

## 2023-06-27 RX ADMIN — DIPHENHYDRAMINE HYDROCHLORIDE 25 MG: 25 CAPSULE ORAL at 01:06

## 2023-06-27 RX ADMIN — HYDRALAZINE HYDROCHLORIDE 25 MG: 25 TABLET, FILM COATED ORAL at 09:06

## 2023-06-27 RX ADMIN — POLYETHYLENE GLYCOL 3350 17 G: 17 POWDER, FOR SOLUTION ORAL at 09:06

## 2023-06-27 RX ADMIN — FAMOTIDINE 20 MG: 20 TABLET, FILM COATED ORAL at 09:06

## 2023-06-27 RX ADMIN — DEXAMETHASONE SODIUM PHOSPHATE 10 MG: 4 INJECTION, SOLUTION INTRA-ARTICULAR; INTRALESIONAL; INTRAMUSCULAR; INTRAVENOUS; SOFT TISSUE at 01:06

## 2023-06-27 RX ADMIN — HYDRALAZINE HYDROCHLORIDE 20 MG: 20 INJECTION INTRAMUSCULAR; INTRAVENOUS at 04:06

## 2023-06-27 RX ADMIN — ACETAMINOPHEN 650 MG: 325 TABLET, FILM COATED ORAL at 10:06

## 2023-06-27 NOTE — PROGRESS NOTES
[facility] Hospital Medicine Progress Note        Chief Complaint: Inpatient follow-up on NSTEMI    HPI:   Rahel Bagley is a 73 y.o. female with a past medical history of essential hypertension, hyperlipidemia, CAD, PAF, MI, CKD, diabetes mellitus type 2, hypothyroidism, osteoarthritis, and obesity presented to [facility] on 6/26/2023 transferred from [place] for NSTEMI.  Patient presented to outside facility for dizziness.  Patient reported feeling lightheaded, nauseous,  and near syncopal with floaters in her vision.  Patient reported symptoms were worse with head position better with rest.  Patient denied chest pain, palpitations, shortness of breath, abdominal pain, vomiting, diarrhea, fever, chills, dysuria, hematuria, rectal bleeding, and dark stools.   Workup at outside facility revealed WBC 5.76, RBC 3.86, hemoglobin 10.6, hematocrit 33.4, chloride 95, CO2 34, BUN 43, creatinine 2.28, glucose 158, , POC troponin 0.92, and D-dimer 16.90.  EKG revealed paced rhythm with heart rate of 67 beats per minute.  Chest x-ray revealed no acute cardiopulmonary process identified.  CT head revealed no acute intracranial process identified.  Cardiology was consulted with recommendations of single dose Lovenox and transfer to [facility] ER for higher level of care.  V/Q scan and bilateral lower extremity venous ultrasounds were ordered upon arrival. Patient was admitted to hospital medicine service for further medical management.       Interval Hx:   Patient without any new complaints.  Patient denies chest or shortness of breath.  She is awaiting coronary angiography which is slated for today.  Patient is afebrile, on room air, and hemodynamically stable.    Objective/physical exam:  General: In no acute distress, afebrile  Chest: Clear to auscultation bilaterally  Heart: RRR, +S1, S2, no appreciable murmur  Abdomen: Soft, nontender, BS +  MSK: Warm, no lower extremity edema, no clubbing  or cyanosis  Neurologic: Alert and oriented x4, Cranial nerve II-XII intact, Strength 5/5 in all 4 extremities    VITAL SIGNS: 24 HRS MIN & MAX LAST   Temp  Min: 97.9 °F (36.6 °C)  Max: 99.5 °F (37.5 °C) 98.5 °F (36.9 °C)   BP  Min: 142/84  Max: 177/71 (!) 169/65   Pulse  Min: 57  Max: 75  66   Resp  Min: 18  Max: 19 18   SpO2  Min: 97 %  Max: 98 % 98 %     I have reviewed the following labs:    Recent Labs   Lab 06/25/23 2057 06/26/23 0403 06/27/23 0623   WBC 5.76 4.57 4.80   RBC 3.86* 3.48* 3.75*   HGB 10.6* 9.7* 10.5*   HCT 33.4* 29.9* 31.9*   MCV 86.5 85.9 85.1   MCH 27.5 27.9 28.0   MCHC 31.7* 32.4* 32.9*   RDW 13.3 13.3 13.2    205 218   MPV 10.5* 11.3* 11.0*       Recent Labs   Lab 06/25/23 2057 06/26/23 0403 06/27/23 0623    139 142   K 3.7 2.9* 3.8   CO2 34* 31 24   BUN 43.0* 42.5* 28.2*   CREATININE 2.28* 1.95* 1.33*   CALCIUM 10.1 9.3 9.8   MG  --  2.30  --    ALBUMIN 3.3* 3.1* 3.1*   ALKPHOS 63 53 54   ALT 10 11 9   AST 22 23 24   BILITOT 0.6 0.8 0.9          Microbiology Results (last 7 days)       ** No results found for the last 168 hours. **             See below for Radiology    Scheduled Med:   carvediloL  25 mg Oral BID    dexAMETHasone  10 mg Intravenous Q8H    diphenhydrAMINE  25 mg Oral Q6H    enoxparin  1 mg/kg Subcutaneous Q24H (treatment, non-standard time)    famotidine  20 mg Oral BID    hydrALAZINE  25 mg Oral BID    levothyroxine  88 mcg Oral Daily    nitroGLYCERIN 2% TD oint  1 inch Topical (Top) Q6H    SITagliptin phosphate  25 mg Oral Daily        Continuous Infusions:  None.      PRN Meds:  acetaminophen, aluminum-magnesium hydroxide-simethicone, hydrALAZINE, labetaloL, melatonin, naloxone, polyethylene glycol, simethicone       Assessment/Plan:  Type 2 NSTEMI   Coronary artery disease  Paroxysmal atrial fibrillation   Essential hypertension, suboptimally controlled  Elevated D-dimer   Acute kidney injury superimposed on stage IIIB chronic kidney disease  Anemia of  chronic disease  Non-insulin-dependent type 2 diabetes mellitus  Hypothyroidism         Plan:  I sat down and reviewed all of the patient's test results with her that she had done since admission   Patient is slated for coronary angiography today  Resume appropriate home medications    VTE prophylaxis:  Full-dose Lovenox    Patient condition:  Stable    Anticipated discharge and Disposition:     TBD    All diagnosis and differential diagnosis have been reviewed; assessment and plan has been documented; I have personally reviewed the labs and test results that are presently available; I have reviewed the patients medication list; I have reviewed the consulting providers response and recommendations. I have reviewed or attempted to review medical records based upon their availability    All of the patient's questions have been  addressed and answered. Patient's is agreeable to the above stated plan. I will continue to monitor closely and make adjustments to medical management as needed.  _____________________________________________________________________      Radiology:  I have personally reviewed the following imaging and agree with the radiologist.     CV Ultrasound doppler venous legs bilat  There was no evidence of deep or superficial vein thrombosis in the   bilateral lower extremities.  Left great saphenous vein previously removed for CABG.  NM Lung Scan Ventilation Perfusion  Narrative: EXAMINATION:  NM LUNG VENTILATION AND PERFUSION IMAGING    CLINICAL HISTORY:  Pulmonary embolism (PE) suspected, positive D-dimer;    COMPARISON:  Chest radiograph yesterday    FINDINGS:  Radiopharmaceutical    38.7 mCi non-HEU Tc99m-PYP    5.2 mCi non-HEU Tc99m-MAA    There is mildly heterogeneous distribution of perfusion radiotracer with no suspicious mismatched defects seen.  Impression: Low probability lung scan.    Electronically signed by: Asad Dodge  Date:    06/26/2023  Time:    10:29  CT Head Without  Contrast  Narrative: EXAMINATION:  CT HEAD WITHOUT CONTRAST    CLINICAL HISTORY:  Dizziness, vision changes.    TECHNIQUE:  Axial CT images were obtained of the brain without intravenous contrast.  Coronal and sagittal reformations were obtained.  Automated exposure control utilized to reduce radiation dose.  Total exam DLP is 1105 mGy cm.    COMPARISON:  None.    FINDINGS:  Gray-white matter differentiation is within normal limits. There is chronic involutional change.  There is chronic white matter microischemic change.  No acute intracranial hemorrhage, extra-axial fluid collection, hydrocephalus, mass effect, midline shift is noted.  No large vessel territory acute ischemia is identified.  Visualized paranasal sinuses demonstrate mild mucoperiosteal thickening within the visualized maxillary sinuses.  Visualized mastoid air cells are clear.  No acute displaced calvarial fracture is identified.  Impression: 1. No acute intracranial abnormalities identified.  2. [place] concordance.    Electronically signed by: Jimmy Bhakta MD  Date:    06/26/2023  Time:    09:53  US Retroperitoneal Limited  Narrative: EXAMINATION:  US RETROPERITONEAL LIMITED    CLINICAL HISTORY:  elevated creatinine;    COMPARISON:  None.    FINDINGS:  Grayscale and color Doppler sonographic evaluation of the kidneys.    The right kidney measures 11 cm. The left kidney measures 11 cm.   No hydronephrosis.  Grossly normal renal parenchymal echogenicity.  There is mild cortical atrophy.  Impression: No hydronephrosis.  Mildly atrophic kidneys.    Electronically signed by: Asad Dodge  Date:    06/26/2023  Time:    09:32      Isaac Rubin MD   06/27/2023

## 2023-06-27 NOTE — PLAN OF CARE
[facility] provided pt with a Perez form to sign/date/time  [facility] provided pt with a copy of the form & inserted the original in pt chart.

## 2023-06-27 NOTE — CONSULTS
Patient Demographics:  Name: Rahel Bagley  Age:  73 y.o.  MRN:  [id number]  Admission Date: 6/26/2023    Chief Complaint upon admit:    Chief Complaint   Patient presents with    NSTEMI     NSTEMI from [facility]. C/o of dizziness on arrival, denies CP    dizzy    Interval history   Dizziness has resolved. She says that her previous MI she did not have any symptoms of chest pains. She at this time is denying chest pains.       Past Medical History:   Diagnosis Date    Chronic kidney disease, unspecified     Pt states stage 4    Diabetes     Diverticulitis     Essential (primary) hypertension     Heart attack     Hypercholesteremia     Hypothyroidism     Mixed hyperlipidemia     Nonrheumatic mitral (valve) insufficiency     Obesity, unspecified     Paroxysmal atrial fibrillation     Unspecified osteoarthritis, unspecified site        Current Facility-Administered Medications:     acetaminophen tablet 650 mg, 650 mg, Oral, Q6H PRN, DREW Corado-BC, 650 mg at 06/27/23 0435    aluminum-magnesium hydroxide-simethicone 200-200-20 mg/5 mL suspension 30 mL, 30 mL, Oral, QID PRN, DREW Corado-BC    carvediloL tablet 25 mg, 25 mg, Oral, BID, Isaac Rubin MD, 25 mg at 06/27/23 0938    enoxaparin injection 80 mg, 1 mg/kg, Subcutaneous, Q24H (treatment, non-standard time), Robi Macias, ACNP, 80 mg at 06/26/23 1657    hydrALAZINE injection 20 mg, 20 mg, Intravenous, Q2H PRN, Isaac Rubin MD, 20 mg at 06/27/23 0439    hydrALAZINE tablet 25 mg, 25 mg, Oral, BID, Isaac Rubin MD, 25 mg at 06/27/23 0938    labetaloL injection 20 mg, 20 mg, Intravenous, Q2H PRN, Isaac Rubin MD    levothyroxine tablet 88 mcg, 88 mcg, Oral, Daily, Isaac Rubin MD    melatonin tablet 6 mg, 6 mg, Oral, Nightly PRN, DREW Corado-BC    naloxone 0.4 mg/mL injection 0.02 mg, 0.02 mg, Intravenous, PRN, Patsy G Lukasz, AGACNP-BC    nitroGLYCERIN 2% TD oint ointment 1 inch, 1 inch, Topical  "(Top), Q6H, Robi GENOVEVA aMcias, ACNP    polyethylene glycol packet 17 g, 17 g, Oral, BID PRN, DREW Corado-BC, 17 g at 06/27/23 0938    simethicone chewable tablet 80 mg, 1 tablet, Oral, QID PRN, SAMANTHA CoradoP-BC    SITagliptin phosphate tablet 25 mg, 25 mg, Oral, Daily, Isaac Rubin MD    There are no hospital problems to display for this patient.      Blood pressure (!) 169/65, pulse 66, temperature 98.5 °F (36.9 °C), temperature source Oral, resp. rate 18, height 5' 2" (1.575 m), weight 77.1 kg (170 lb), SpO2 98 %.  General: Alert, Awake, Oriented x 3, No Acute Distress,   Head: normocephalic, atraumatic  Eyes: PERRL, EOMI, no scleral icterus, no conjunctival pallor  Nose: no tenderness to palpation, no drainage or erythema appreciated  Mouth and Throat: poor dentition, no lesions noted, moist mucus membranes  Neck: no lymphadenopathy appreciated, No carotid bruits, JVD 9 cm  Respiratory: lungs clear to ascultation bilaterally, no accessory use of muscles   Cardiovascular: regular rate with regular rhythm, no murmurs, rubs, or S3, S4, normal apical impulse.  Gastrointestinal: soft, nontender, nondistended,no rebound or guarding, normoactive bowel sounds.   Extremities: pulses 2+ in all extremities, 1+ pitting edema, normal ROM   Neuro:  full strength even in all extremities, no sensory deficits.   Skin: no lesions, rashes, or breakdown.        Inpatient Diagnostics:  Recent Results (from the past 24 hour(s))   POCT glucose    Collection Time: 06/26/23  4:53 PM   Result Value Ref Range    POCT Glucose 86 70 - 110 mg/dL   Troponin I    Collection Time: 06/26/23  5:17 PM   Result Value Ref Range    Troponin-I 1.197 (H) 0.000 - 0.045 ng/mL   Troponin I    Collection Time: 06/27/23 12:24 AM   Result Value Ref Range    Troponin-I 0.728 (H) 0.000 - 0.045 ng/mL   Comprehensive Metabolic Panel    Collection Time: 06/27/23  6:23 AM   Result Value Ref Range    Sodium Level 142 136 - 145 mmol/L    " Potassium Level 3.8 3.5 - 5.1 mmol/L    Chloride 107 98 - 107 mmol/L    Carbon Dioxide 24 23 - 31 mmol/L    Glucose Level 118 (H) 82 - 115 mg/dL    Blood Urea Nitrogen 28.2 (H) 9.8 - 20.1 mg/dL    Creatinine 1.33 (H) 0.55 - 1.02 mg/dL    Calcium Level Total 9.8 8.4 - 10.2 mg/dL    Protein Total 6.7 5.8 - 7.6 gm/dL    Albumin Level 3.1 (L) 3.4 - 4.8 g/dL    Globulin 3.6 (H) 2.4 - 3.5 gm/dL    Albumin/Globulin Ratio 0.9 (L) 1.1 - 2.0 ratio    Bilirubin Total 0.9 <=1.5 mg/dL    Alkaline Phosphatase 54 40 - 150 unit/L    Alanine Aminotransferase 9 0 - 55 unit/L    Aspartate Aminotransferase 24 5 - 34 unit/L    eGFR 42 mls/min/1.73/m2   CBC with Differential    Collection Time: 06/27/23  6:23 AM   Result Value Ref Range    WBC 4.80 4.50 - 11.50 x10(3)/mcL    RBC 3.75 (L) 4.20 - 5.40 x10(6)/mcL    Hgb 10.5 (L) 12.0 - 16.0 g/dL    Hct 31.9 (L) 37.0 - 47.0 %    MCV 85.1 80.0 - 94.0 fL    MCH 28.0 27.0 - 31.0 pg    MCHC 32.9 (L) 33.0 - 36.0 g/dL    RDW 13.2 11.5 - 17.0 %    Platelet 218 130 - 400 x10(3)/mcL    MPV 11.0 (H) 7.4 - 10.4 fL    Neut % 68.0 %    Lymph % 17.3 %    Mono % 10.6 %    Eos % 3.1 %    Basophil % 0.6 %    Lymph # 0.83 0.6 - 4.6 x10(3)/mcL    Neut # 3.26 2.1 - 9.2 x10(3)/mcL    Mono # 0.51 0.1 - 1.3 x10(3)/mcL    Eos # 0.15 0 - 0.9 x10(3)/mcL    Baso # 0.03 <=0.2 x10(3)/mcL    IG# 0.02 0 - 0.04 x10(3)/mcL    IG% 0.4 %    NRBC% 0.0 %   Non-ST segment elevation MI  Patient currently volume depleted but patient symptoms similar to prestent/pre-CABG symptoms in the past.    Renal function back close to baseline   Will proceed with angiogram today.   Add nitrates/lovenox  Monitor h/h    Anemia of chronic duration  Per primary    Chronic systolic heart failure status post BiV ICD per Dr. Montanez  Monitor volume replacement carefully  Continue hydralazine and beta-blocker    Status post MitraClip  Stable    Hyperlipidemia  On Repatha                 As above          Addendum   Unable to do LHC today due to  contrast reaction in the past.   Starting pre medications with IV solumedrol and benadryl and will plan for angiogram in the AM     Khris Oconnor  6/27/2023

## 2023-06-28 LAB
POC ACTIVATED CLOTTING TIME K: 185 SEC (ref 74–137)
POC ACTIVATED CLOTTING TIME K: 221 SEC (ref 74–137)
POCT GLUCOSE: 205 MG/DL (ref 70–110)
POCT GLUCOSE: 339 MG/DL (ref 70–110)
SAMPLE: ABNORMAL
SAMPLE: ABNORMAL

## 2023-06-28 PROCEDURE — 99153 MOD SED SAME PHYS/QHP EA: CPT | Performed by: STUDENT IN AN ORGANIZED HEALTH CARE EDUCATION/TRAINING PROGRAM

## 2023-06-28 PROCEDURE — C1887 CATHETER, GUIDING: HCPCS | Performed by: STUDENT IN AN ORGANIZED HEALTH CARE EDUCATION/TRAINING PROGRAM

## 2023-06-28 PROCEDURE — C1753 CATH, INTRAVAS ULTRASOUND: HCPCS | Performed by: STUDENT IN AN ORGANIZED HEALTH CARE EDUCATION/TRAINING PROGRAM

## 2023-06-28 PROCEDURE — C1769 GUIDE WIRE: HCPCS | Performed by: STUDENT IN AN ORGANIZED HEALTH CARE EDUCATION/TRAINING PROGRAM

## 2023-06-28 PROCEDURE — 93010 ELECTROCARDIOGRAM REPORT: CPT | Mod: ,,, | Performed by: INTERNAL MEDICINE

## 2023-06-28 PROCEDURE — C1725 CATH, TRANSLUMIN NON-LASER: HCPCS | Performed by: STUDENT IN AN ORGANIZED HEALTH CARE EDUCATION/TRAINING PROGRAM

## 2023-06-28 PROCEDURE — 63600175 PHARM REV CODE 636 W HCPCS: Performed by: STUDENT IN AN ORGANIZED HEALTH CARE EDUCATION/TRAINING PROGRAM

## 2023-06-28 PROCEDURE — 25000003 PHARM REV CODE 250: Performed by: INTERNAL MEDICINE

## 2023-06-28 PROCEDURE — 25000003 PHARM REV CODE 250: Performed by: STUDENT IN AN ORGANIZED HEALTH CARE EDUCATION/TRAINING PROGRAM

## 2023-06-28 PROCEDURE — 93005 ELECTROCARDIOGRAM TRACING: CPT

## 2023-06-28 PROCEDURE — 99152 MOD SED SAME PHYS/QHP 5/>YRS: CPT | Performed by: STUDENT IN AN ORGANIZED HEALTH CARE EDUCATION/TRAINING PROGRAM

## 2023-06-28 PROCEDURE — C9600 PERC DRUG-EL COR STENT SING: HCPCS | Mod: LC | Performed by: STUDENT IN AN ORGANIZED HEALTH CARE EDUCATION/TRAINING PROGRAM

## 2023-06-28 PROCEDURE — 92978 ENDOLUMINL IVUS OCT C 1ST: CPT | Performed by: STUDENT IN AN ORGANIZED HEALTH CARE EDUCATION/TRAINING PROGRAM

## 2023-06-28 PROCEDURE — 76937 US GUIDE VASCULAR ACCESS: CPT

## 2023-06-28 PROCEDURE — 93010 EKG 12-LEAD: ICD-10-PCS | Mod: ,,, | Performed by: INTERNAL MEDICINE

## 2023-06-28 PROCEDURE — C1751 CATH, INF, PER/CENT/MIDLINE: HCPCS

## 2023-06-28 PROCEDURE — 25500020 PHARM REV CODE 255: Performed by: STUDENT IN AN ORGANIZED HEALTH CARE EDUCATION/TRAINING PROGRAM

## 2023-06-28 PROCEDURE — 93459 L HRT ART/GRFT ANGIO: CPT | Mod: 59 | Performed by: STUDENT IN AN ORGANIZED HEALTH CARE EDUCATION/TRAINING PROGRAM

## 2023-06-28 PROCEDURE — C1874 STENT, COATED/COV W/DEL SYS: HCPCS | Performed by: STUDENT IN AN ORGANIZED HEALTH CARE EDUCATION/TRAINING PROGRAM

## 2023-06-28 PROCEDURE — 27201423 OPTIME MED/SURG SUP & DEVICES STERILE SUPPLY: Performed by: STUDENT IN AN ORGANIZED HEALTH CARE EDUCATION/TRAINING PROGRAM

## 2023-06-28 PROCEDURE — 63600175 PHARM REV CODE 636 W HCPCS: Performed by: INTERNAL MEDICINE

## 2023-06-28 PROCEDURE — 36410 VNPNXR 3YR/> PHY/QHP DX/THER: CPT

## 2023-06-28 PROCEDURE — 21400001 HC TELEMETRY ROOM

## 2023-06-28 PROCEDURE — C1894 INTRO/SHEATH, NON-LASER: HCPCS | Performed by: STUDENT IN AN ORGANIZED HEALTH CARE EDUCATION/TRAINING PROGRAM

## 2023-06-28 DEVICE — EVEROLIMUS-ELUTING PLATINUM CHROMIUM CORONARY STENT SYSTEM
Type: IMPLANTABLE DEVICE | Site: CORONARY | Status: FUNCTIONAL
Brand: SYNERGY™ XD

## 2023-06-28 RX ORDER — GLUCAGON 1 MG
1 KIT INJECTION
Status: DISCONTINUED | OUTPATIENT
Start: 2023-06-28 | End: 2023-06-29 | Stop reason: HOSPADM

## 2023-06-28 RX ORDER — DIPHENHYDRAMINE HCL 25 MG
50 CAPSULE ORAL EVERY 6 HOURS PRN
Status: DISCONTINUED | OUTPATIENT
Start: 2023-06-28 | End: 2023-06-29 | Stop reason: HOSPADM

## 2023-06-28 RX ORDER — ASPIRIN 325 MG
TABLET ORAL
Status: DISCONTINUED | OUTPATIENT
Start: 2023-06-28 | End: 2023-06-28 | Stop reason: HOSPADM

## 2023-06-28 RX ORDER — SODIUM CHLORIDE 0.9 % (FLUSH) 0.9 %
10 SYRINGE (ML) INJECTION EVERY 6 HOURS
Status: DISCONTINUED | OUTPATIENT
Start: 2023-06-29 | End: 2023-06-29 | Stop reason: HOSPADM

## 2023-06-28 RX ORDER — IBUPROFEN 200 MG
24 TABLET ORAL
Status: DISCONTINUED | OUTPATIENT
Start: 2023-06-28 | End: 2023-06-29 | Stop reason: HOSPADM

## 2023-06-28 RX ORDER — MAG HYDROX/ALUMINUM HYD/SIMETH 200-200-20
SUSPENSION, ORAL (FINAL DOSE FORM) ORAL
Status: DISCONTINUED | OUTPATIENT
Start: 2023-06-28 | End: 2023-06-28 | Stop reason: HOSPADM

## 2023-06-28 RX ORDER — INSULIN ASPART 100 [IU]/ML
1-10 INJECTION, SOLUTION INTRAVENOUS; SUBCUTANEOUS
Status: DISCONTINUED | OUTPATIENT
Start: 2023-06-28 | End: 2023-06-29 | Stop reason: HOSPADM

## 2023-06-28 RX ORDER — GABAPENTIN 300 MG/1
300 CAPSULE ORAL 2 TIMES DAILY
COMMUNITY

## 2023-06-28 RX ORDER — GABAPENTIN 300 MG/1
300 CAPSULE ORAL 2 TIMES DAILY
Status: DISCONTINUED | OUTPATIENT
Start: 2023-06-28 | End: 2023-06-29 | Stop reason: HOSPADM

## 2023-06-28 RX ORDER — CLOPIDOGREL 300 MG/1
TABLET, FILM COATED ORAL
Status: DISCONTINUED | OUTPATIENT
Start: 2023-06-28 | End: 2023-06-28 | Stop reason: HOSPADM

## 2023-06-28 RX ORDER — MIDAZOLAM HYDROCHLORIDE 1 MG/ML
INJECTION INTRAMUSCULAR; INTRAVENOUS
Status: DISCONTINUED | OUTPATIENT
Start: 2023-06-28 | End: 2023-06-28 | Stop reason: HOSPADM

## 2023-06-28 RX ORDER — SODIUM CHLORIDE 0.9 % (FLUSH) 0.9 %
10 SYRINGE (ML) INJECTION
Status: DISCONTINUED | OUTPATIENT
Start: 2023-06-28 | End: 2023-06-29 | Stop reason: HOSPADM

## 2023-06-28 RX ORDER — LIDOCAINE HYDROCHLORIDE 10 MG/ML
INJECTION INFILTRATION; PERINEURAL
Status: DISCONTINUED | OUTPATIENT
Start: 2023-06-28 | End: 2023-06-28 | Stop reason: HOSPADM

## 2023-06-28 RX ORDER — IBUPROFEN 200 MG
16 TABLET ORAL
Status: DISCONTINUED | OUTPATIENT
Start: 2023-06-28 | End: 2023-06-29 | Stop reason: HOSPADM

## 2023-06-28 RX ADMIN — DIPHENHYDRAMINE HYDROCHLORIDE 50 MG: 25 CAPSULE ORAL at 03:06

## 2023-06-28 RX ADMIN — DEXAMETHASONE SODIUM PHOSPHATE 10 MG: 4 INJECTION, SOLUTION INTRA-ARTICULAR; INTRALESIONAL; INTRAMUSCULAR; INTRAVENOUS; SOFT TISSUE at 05:06

## 2023-06-28 RX ADMIN — GABAPENTIN 300 MG: 300 CAPSULE ORAL at 09:06

## 2023-06-28 RX ADMIN — CARVEDILOL 25 MG: 12.5 TABLET, FILM COATED ORAL at 09:06

## 2023-06-28 RX ADMIN — DIPHENHYDRAMINE HYDROCHLORIDE 25 MG: 25 CAPSULE ORAL at 04:06

## 2023-06-28 RX ADMIN — Medication 10 ML: at 11:06

## 2023-06-28 RX ADMIN — INSULIN ASPART 8 UNITS: 100 INJECTION, SOLUTION INTRAVENOUS; SUBCUTANEOUS at 05:06

## 2023-06-28 RX ADMIN — SITAGLIPTIN 25 MG: 25 TABLET, FILM COATED ORAL at 05:06

## 2023-06-28 RX ADMIN — HYDRALAZINE HYDROCHLORIDE 25 MG: 25 TABLET, FILM COATED ORAL at 09:06

## 2023-06-28 RX ADMIN — HYDRALAZINE HYDROCHLORIDE 20 MG: 20 INJECTION INTRAMUSCULAR; INTRAVENOUS at 09:06

## 2023-06-28 NOTE — PROGRESS NOTES
Patient Demographics:  Name: Rahel Bagley  Age:  73 y.o.  MRN:  [id number]  Admission Date: 6/26/2023    Chief Complaint upon admit:    Chief Complaint   Patient presents with    NSTEMI     NSTEMI from [place]. C/o of dizziness on arrival, denies CP    dizzy    Interval history   Dizziness has resolved. \receved pre medications for contrast allergy. Off the floor for cath today.       Past Medical History:   Diagnosis Date    Chronic kidney disease, unspecified     Pt states stage 4    Diabetes     Diverticulitis     Essential (primary) hypertension     Heart attack     Hypercholesteremia     Hypothyroidism     Mixed hyperlipidemia     Nonrheumatic mitral (valve) insufficiency     Obesity, unspecified     Paroxysmal atrial fibrillation     Unspecified osteoarthritis, unspecified site        Current Facility-Administered Medications:     acetaminophen tablet 650 mg, 650 mg, Oral, Q6H PRN, DREW Corado-BC, 650 mg at 06/27/23 2255    aluminum-magnesium hydroxide-simethicone 200-200-20 mg/5 mL suspension 30 mL, 30 mL, Oral, QID PRN, AMPARO Corado    carvediloL tablet 25 mg, 25 mg, Oral, BID, Isaac Rubin MD, 25 mg at 06/27/23 2116    enoxaparin injection 80 mg, 1 mg/kg, Subcutaneous, Q24H (treatment, non-standard time), Robi Macias, ZAHEER, 80 mg at 06/27/23 1714    hydrALAZINE injection 20 mg, 20 mg, Intravenous, Q2H PRN, Isaac Rubin MD, 20 mg at 06/27/23 0439    hydrALAZINE tablet 25 mg, 25 mg, Oral, BID, Isaac Rubin MD, 25 mg at 06/27/23 2115    labetaloL injection 20 mg, 20 mg, Intravenous, Q2H PRN, Isaac Rubin MD    levothyroxine tablet 88 mcg, 88 mcg, Oral, Daily, Isaac Rubin MD    melatonin tablet 6 mg, 6 mg, Oral, Nightly PRN, AMPARO Corado    naloxone 0.4 mg/mL injection 0.02 mg, 0.02 mg, Intravenous, PRN, AMPARO Corado    nitroGLYCERIN 2% TD oint ointment 1 inch, 1 inch, Topical (Top), Q6H, Robi Macias, TRISHP     "polyethylene glycol packet 17 g, 17 g, Oral, BID PRN, Patsy Lal AGACNP-BC, 17 g at 06/27/23 0938    simethicone chewable tablet 80 mg, 1 tablet, Oral, QID PRN, Patsy Lal AGACNP-BC    SITagliptin phosphate tablet 25 mg, 25 mg, Oral, Daily, Isaac Rubin MD    There are no hospital problems to display for this patient.      Blood pressure (!) 153/62, pulse 71, temperature 97.9 °F (36.6 °C), temperature source Oral, resp. rate 18, height 5' 2" (1.575 m), weight 75.6 kg (166 lb 11.2 oz), SpO2 97 %.  General: Alert, Awake, Oriented x 3, No Acute Distress,   Head: normocephalic, atraumatic  Eyes: PERRL, EOMI, no scleral icterus, no conjunctival pallor  Nose: no tenderness to palpation, no drainage or erythema appreciated  Mouth and Throat: poor dentition, no lesions noted, moist mucus membranes  Neck: no lymphadenopathy appreciated, No carotid bruits, JVD 9 cm  Respiratory: lungs clear to ascultation bilaterally, no accessory use of muscles   Cardiovascular: regular rate with regular rhythm, no murmurs, rubs, or S3, S4, normal apical impulse.  Gastrointestinal: soft, nontender, nondistended,no rebound or guarding, normoactive bowel sounds.   Extremities: pulses 2+ in all extremities, 1+ pitting edema, normal ROM   Neuro:  full strength even in all extremities, no sensory deficits.   Skin: no lesions, rashes, or breakdown.        Inpatient Diagnostics:  Recent Results (from the past 24 hour(s))   POCT glucose    Collection Time: 06/27/23 11:11 AM   Result Value Ref Range    POCT Glucose 116 (H) 70 - 110 mg/dL   POCT glucose    Collection Time: 06/27/23  5:12 PM   Result Value Ref Range    POCT Glucose 187 (H) 70 - 110 mg/dL   Non-ST segment elevation MI  Patient currently volume depleted but patient symptoms similar to prestent/pre-CABG symptoms in the past.    Renal function back close to baseline   Will proceed with angiogram today. Further recs to follow pending results.   Add " nitrates/lovenox  Monitor h/h    Anemia of chronic duration  Per primary    Chronic systolic heart failure status post BiV ICD per Dr. Montanez  Monitor volume replacement carefully  Continue hydralazine and beta-blocker    Status post MitraClip  Stable    Hyperlipidemia  On Repatha                 As above          Khris Oconnor  6/28/2023

## 2023-06-28 NOTE — PROCEDURES
"Rahel Bagley is a 73 y.o. female patient.    Temp: 97.9 °F (36.6 °C) (06/28/23 1550)  Pulse: 66 (06/28/23 1550)  Resp: 18 (06/28/23 1550)  BP: (!) 158/66 (06/28/23 1739)  SpO2: 98 % (06/28/23 1550)  Weight: 75.6 kg (166 lb 11.2 oz) (06/28/23 0657)  Height: 5' 2" (157.5 cm) (06/26/23 0130)    PICC  Date/Time: 6/28/2023 6:43 PM  Performed by: Jeremie Alatorre RN  Consent Done: Yes  Time out: Immediately prior to procedure a time out was called to verify the correct patient, procedure, equipment, support staff and site/side marked as required  Indications: med administration and vascular access  Anesthesia: local infiltration  Local anesthetic: lidocaine 1% without epinephrine    Preparation: skin prepped with ChloraPrep  Skin prep agent dried: skin prep agent completely dried prior to procedure  Sterile barriers: all five maximum sterile barriers used - cap, mask, sterile gown, sterile gloves, and large sterile sheet  Hand hygiene: hand hygiene performed prior to central venous catheter insertion  Location details: left brachial  Catheter type: single lumen  Catheter size: 4 Fr  Catheter Length: 12cm    Ultrasound guidance: yes  Vessel Caliber: patent, compressibility normal  Needle advanced into vessel with real time Ultrasound guidance.  Guidewire confirmed in vessel.  Sterile sheath used.  Number of attempts: 1  Post-procedure: blood return through all ports, chlorhexidine patch and sterile dressing applied          Name Jeremie Alatorre RN   6/28/2023    "

## 2023-06-28 NOTE — PROGRESS NOTES
[facility] Hospital Medicine Progress Note        Chief Complaint: Inpatient follow-up on NSTEMI    HPI:   Rahel Bagley is a 73 y.o. female with a past medical history of essential hypertension, hyperlipidemia, CAD, PAF, MI, CKD, diabetes mellitus type 2, hypothyroidism, osteoarthritis, and obesity presented to [facility] on 6/26/2023 transferred from [place] for NSTEMI.  Patient presented to outside facility for dizziness.  Patient reported feeling lightheaded, nauseous,  and near syncopal with floaters in her vision.  Patient reported symptoms were worse with head position better with rest.  Patient denied chest pain, palpitations, shortness of breath, abdominal pain, vomiting, diarrhea, fever, chills, dysuria, hematuria, rectal bleeding, and dark stools.   Workup at outside facility revealed WBC 5.76, RBC 3.86, hemoglobin 10.6, hematocrit 33.4, chloride 95, CO2 34, BUN 43, creatinine 2.28, glucose 158, , POC troponin 0.92, and D-dimer 16.90.  EKG revealed paced rhythm with heart rate of 67 beats per minute.  Chest x-ray revealed no acute cardiopulmonary process identified.  CT head revealed no acute intracranial process identified.  Cardiology was consulted with recommendations of single dose Lovenox and transfer to [facility] ER for higher level of care.  V/Q scan and bilateral lower extremity venous ultrasounds were ordered upon arrival. Patient was admitted to hospital medicine service for further medical management.       Interval Hx:   Patient is lying flat in bed with a sheath in place.  She underwent coronary angiography earlier.  Patient denies chest pain or shortness of breath. Patient is afebrile, on room air, and hemodynamically stable.    Objective/physical exam:  General: in no acute distress  HENT: normocephalic, atraumatic  Eye: PERRL, EOMI, clear conjunctiva  Neck: full ROM, no thyromegaly, no JVD  Respiratory: clear to auscultation bilaterally  Cardiovascular:  regular rate and rhythm  Gastrointestinal: non-distended, positive bowel sounds, non-tender  Musculoskeletal: no gross deformity  Integumentary: warm, dry, intact, no rashes  Neurological: cranial nerves grossly intact, no focal neurological deficit  Psychiatric: cooperative      VITAL SIGNS: 24 HRS MIN & MAX LAST   Temp  Min: 97.8 °F (36.6 °C)  Max: 98.1 °F (36.7 °C) 97.9 °F (36.6 °C)   BP  Min: 105/70  Max: 177/73 105/70   Pulse  Min: 66  Max: 90  90   Resp  Min: 18  Max: 20 18   SpO2  Min: 97 %  Max: 100 % 100 %     I have reviewed the following labs:    Recent Labs   Lab 06/25/23 2057 06/26/23 0403 06/27/23 0623   WBC 5.76 4.57 4.80   RBC 3.86* 3.48* 3.75*   HGB 10.6* 9.7* 10.5*   HCT 33.4* 29.9* 31.9*   MCV 86.5 85.9 85.1   MCH 27.5 27.9 28.0   MCHC 31.7* 32.4* 32.9*   RDW 13.3 13.3 13.2    205 218   MPV 10.5* 11.3* 11.0*       Recent Labs   Lab 06/25/23 2057 06/26/23 0403 06/27/23 0623    139 142   K 3.7 2.9* 3.8   CO2 34* 31 24   BUN 43.0* 42.5* 28.2*   CREATININE 2.28* 1.95* 1.33*   CALCIUM 10.1 9.3 9.8   MG  --  2.30  --    ALBUMIN 3.3* 3.1* 3.1*   ALKPHOS 63 53 54   ALT 10 11 9   AST 22 23 24   BILITOT 0.6 0.8 0.9          Microbiology Results (last 7 days)       ** No results found for the last 168 hours. **             See below for Radiology    Scheduled Med:   carvediloL  25 mg Oral BID    enoxparin  1 mg/kg Subcutaneous Q24H (treatment, non-standard time)    hydrALAZINE  25 mg Oral BID    levothyroxine  88 mcg Oral Daily    nitroGLYCERIN 2% TD oint  1 inch Topical (Top) Q6H    SITagliptin phosphate  25 mg Oral Daily        Continuous Infusions:  None.      PRN Meds:  acetaminophen, aluminum-magnesium hydroxide-simethicone, diphenhydrAMINE, hydrALAZINE, labetaloL, melatonin, naloxone, polyethylene glycol, simethicone       Assessment/Plan:  Type 2 NSTEMI   Coronary artery disease  Mitral regurgitation status post MitraClip  Cardiomyopathy status post biventricular ICD  Paroxysmal  atrial fibrillation   Essential hypertension, suboptimally controlled  Elevated D-dimer   Acute kidney injury superimposed on stage IIIB chronic kidney disease  Anemia of chronic disease  Non-insulin-dependent type 2 diabetes mellitus  Hypothyroidism         Plan:  Patient underwent coronary angiography today. Findings are unavailable at this time.  Resumed appropriate home medications    VTE prophylaxis:  Full-dose Lovenox per Cardiology recommendations    Patient condition:  Stable    Anticipated discharge and Disposition:     TBD    All diagnosis and differential diagnosis have been reviewed; assessment and plan has been documented; I have personally reviewed the labs and test results that are presently available; I have reviewed the patients medication list; I have reviewed the consulting providers response and recommendations. I have reviewed or attempted to review medical records based upon their availability    All of the patient's questions have been  addressed and answered. Patient's is agreeable to the above stated plan. I will continue to monitor closely and make adjustments to medical management as needed.  _____________________________________________________________________      Radiology:  I have personally reviewed the following imaging and agree with the radiologist.     Cardiac catheterization    The SVG to OM Graft was 100% stenosed.    The Prox RCA lesion was 65% stenosed.    The RPDA lesion was 50% stenosed.    The Ost Cx to Prox Cx Stent was 95% stenosed with 0% stenosis   post-intervention.    The left ventricular end diastolic pressure was normal.    The estimated blood loss was none.    The procedure log was documented by No documenter listed and verified by   Khris Oconnor MD.    Date: 6/28/2023  Time: 8:50 AM      Isaac Rubin MD   06/28/2023

## 2023-06-29 VITALS
HEIGHT: 62 IN | OXYGEN SATURATION: 98 % | WEIGHT: 166.69 LBS | SYSTOLIC BLOOD PRESSURE: 136 MMHG | DIASTOLIC BLOOD PRESSURE: 69 MMHG | TEMPERATURE: 98 F | RESPIRATION RATE: 18 BRPM | BODY MASS INDEX: 30.67 KG/M2 | HEART RATE: 67 BPM

## 2023-06-29 PROBLEM — E43 SEVERE MALNUTRITION: Status: ACTIVE | Noted: 2023-06-29

## 2023-06-29 LAB
ALBUMIN SERPL-MCNC: 3 G/DL (ref 3.4–4.8)
ALBUMIN/GLOB SERPL: 0.9 RATIO (ref 1.1–2)
ALP SERPL-CCNC: 63 UNIT/L (ref 40–150)
ALT SERPL-CCNC: 9 UNIT/L (ref 0–55)
AST SERPL-CCNC: 22 UNIT/L (ref 5–34)
BASOPHILS # BLD AUTO: 0.01 X10(3)/MCL
BASOPHILS NFR BLD AUTO: 0.1 %
BILIRUBIN DIRECT+TOT PNL SERPL-MCNC: 0.4 MG/DL
BUN SERPL-MCNC: 39.5 MG/DL (ref 9.8–20.1)
CALCIUM SERPL-MCNC: 9.8 MG/DL (ref 8.4–10.2)
CHLORIDE SERPL-SCNC: 108 MMOL/L (ref 98–107)
CO2 SERPL-SCNC: 24 MMOL/L (ref 23–31)
CREAT SERPL-MCNC: 1.7 MG/DL (ref 0.55–1.02)
EOSINOPHIL # BLD AUTO: 0 X10(3)/MCL (ref 0–0.9)
EOSINOPHIL NFR BLD AUTO: 0 %
ERYTHROCYTE [DISTWIDTH] IN BLOOD BY AUTOMATED COUNT: 13.9 % (ref 11.5–17)
GFR SERPLBLD CREATININE-BSD FMLA CKD-EPI: 32 MLS/MIN/1.73/M2
GLOBULIN SER-MCNC: 3.3 GM/DL (ref 2.4–3.5)
GLUCOSE SERPL-MCNC: 160 MG/DL (ref 82–115)
HCT VFR BLD AUTO: 34.3 % (ref 37–47)
HGB BLD-MCNC: 11.1 G/DL (ref 12–16)
IMM GRANULOCYTES # BLD AUTO: 0.03 X10(3)/MCL (ref 0–0.04)
IMM GRANULOCYTES NFR BLD AUTO: 0.4 %
LYMPHOCYTES # BLD AUTO: 1.03 X10(3)/MCL (ref 0.6–4.6)
LYMPHOCYTES NFR BLD AUTO: 12.3 %
MCH RBC QN AUTO: 28.1 PG (ref 27–31)
MCHC RBC AUTO-ENTMCNC: 32.4 G/DL (ref 33–36)
MCV RBC AUTO: 86.8 FL (ref 80–94)
MONOCYTES # BLD AUTO: 0.61 X10(3)/MCL (ref 0.1–1.3)
MONOCYTES NFR BLD AUTO: 7.3 %
NEUTROPHILS # BLD AUTO: 6.68 X10(3)/MCL (ref 2.1–9.2)
NEUTROPHILS NFR BLD AUTO: 79.9 %
NRBC BLD AUTO-RTO: 0 %
PLATELET # BLD AUTO: 238 X10(3)/MCL (ref 130–400)
PMV BLD AUTO: 10.5 FL (ref 7.4–10.4)
POCT GLUCOSE: 185 MG/DL (ref 70–110)
POTASSIUM SERPL-SCNC: 4.2 MMOL/L (ref 3.5–5.1)
PROT SERPL-MCNC: 6.3 GM/DL (ref 5.8–7.6)
RBC # BLD AUTO: 3.95 X10(6)/MCL (ref 4.2–5.4)
SODIUM SERPL-SCNC: 139 MMOL/L (ref 136–145)
WBC # SPEC AUTO: 8.36 X10(3)/MCL (ref 4.5–11.5)

## 2023-06-29 PROCEDURE — 25000003 PHARM REV CODE 250: Performed by: INTERNAL MEDICINE

## 2023-06-29 PROCEDURE — 25000003 PHARM REV CODE 250: Performed by: STUDENT IN AN ORGANIZED HEALTH CARE EDUCATION/TRAINING PROGRAM

## 2023-06-29 PROCEDURE — 25000003 PHARM REV CODE 250: Performed by: NURSE PRACTITIONER

## 2023-06-29 PROCEDURE — 85025 COMPLETE CBC W/AUTO DIFF WBC: CPT | Performed by: STUDENT IN AN ORGANIZED HEALTH CARE EDUCATION/TRAINING PROGRAM

## 2023-06-29 PROCEDURE — 27000221 HC OXYGEN, UP TO 24 HOURS

## 2023-06-29 PROCEDURE — 80053 COMPREHEN METABOLIC PANEL: CPT | Performed by: INTERNAL MEDICINE

## 2023-06-29 PROCEDURE — A4216 STERILE WATER/SALINE, 10 ML: HCPCS | Performed by: INTERNAL MEDICINE

## 2023-06-29 RX ORDER — CLOPIDOGREL BISULFATE 75 MG/1
75 TABLET ORAL DAILY
Status: DISCONTINUED | OUTPATIENT
Start: 2023-06-29 | End: 2023-06-29 | Stop reason: HOSPADM

## 2023-06-29 RX ORDER — NAPROXEN SODIUM 220 MG/1
81 TABLET, FILM COATED ORAL DAILY
Status: DISCONTINUED | OUTPATIENT
Start: 2023-06-29 | End: 2023-06-29 | Stop reason: HOSPADM

## 2023-06-29 RX ADMIN — NITROGLYCERIN 1 INCH: 20 OINTMENT TOPICAL at 11:06

## 2023-06-29 RX ADMIN — CARVEDILOL 25 MG: 12.5 TABLET, FILM COATED ORAL at 09:06

## 2023-06-29 RX ADMIN — ASPIRIN 81 MG CHEWABLE TABLET 81 MG: 81 TABLET CHEWABLE at 11:06

## 2023-06-29 RX ADMIN — CLOPIDOGREL BISULFATE 75 MG: 75 TABLET ORAL at 11:06

## 2023-06-29 RX ADMIN — Medication 10 ML: at 12:06

## 2023-06-29 RX ADMIN — LEVOTHYROXINE SODIUM 88 MCG: 88 TABLET ORAL at 05:06

## 2023-06-29 RX ADMIN — HYDRALAZINE HYDROCHLORIDE 25 MG: 25 TABLET, FILM COATED ORAL at 09:06

## 2023-06-29 RX ADMIN — GABAPENTIN 300 MG: 300 CAPSULE ORAL at 09:06

## 2023-06-29 NOTE — PROGRESS NOTES
Inpatient Nutrition Assessment    Admit Date: 6/26/2023   Total duration of encounter: 3 days     Nutrition Recommendation/Prescription     -Continue heart healthy diet. Monitor CBGs for need to add diabetic diet restriction.   -Add vanilla Boost Glucose Control (provides 190 kcal, 16 g protein per serving) once daily.     Communication of Recommendations: reviewed with patient    Nutrition Assessment     Malnutrition Assessment/Nutrition-Focused Physical Exam    Malnutrition Context: acute illness or injury  Malnutrition Level: moderate  Energy Intake (Malnutrition): less than 75% for greater than 7 days  Weight Loss (Malnutrition): greater than 5% in 1 month  Subcutaneous Fat (Malnutrition):  (does not meet criteria)           Muscle Mass (Malnutrition):  (does not meet criteria)                          Fluid Accumulation (Malnutrition):  (does not meet criteria)        A minimum of two characteristics is recommended for diagnosis of either severe or non-severe malnutrition.    Chart Review    Reason Seen: malnutrition screening tool (MST)    Malnutrition Screening Tool Results   Have you recently lost weight without trying?: Yes: 34 lbs or more  Have you been eating poorly because of a decreased appetite?: Yes   MST Score: 5     Diagnosis:  Type 2 NSTEMI   Coronary artery disease  Mitral regurgitation status post MitraClip  Cardiomyopathy status post biventricular ICD  Paroxysmal atrial fibrillation   Essential hypertension, suboptimally controlled  Elevated D-dimer   Acute kidney injury superimposed on stage IIIB chronic kidney disease  Anemia of chronic disease  Non-insulin-dependent type 2 diabetes mellitus  Hypothyroidism     Relevant Medical History:   Past Medical History:   Diagnosis Date    Chronic kidney disease, unspecified     Pt states stage 4    Diabetes     Diverticulitis     Essential (primary) hypertension     Heart attack     Hypercholesteremia     Hypothyroidism     Mixed hyperlipidemia      "Nonrheumatic mitral (valve) insufficiency     Obesity, unspecified     Paroxysmal atrial fibrillation     Unspecified osteoarthritis, unspecified site       Nutrition-Related Medications: 30 unites detemir, levothyroxine, sitagliptin, SSI, miralax  Calorie Containing IV Medications: no significant kcals from medications at this time    Nutrition-Related Labs:  23 Cl 108, BUN 39.5, Crea 1.7, eGFR 32, Gluc 160, Alb 3    Diet/PN Order: Diet heart healthy  Oral Supplement Order: none  Tube Feeding Order: none  Appetite/Oral Intake: fair/50-75% of meals  Factors Affecting Nutritional Intake: none identified  Food/Jehovah's witness/Cultural Preferences: none reported  Food Allergies: fish, shellfish, and onions    Skin Integrity: incision  Wound(s):       Comments    23 Reports decreased appetite x1-2 months and about 10lb weight loss x1-2 months, but with improved appetite over the last 2 days. Denies any significant GI complaints. Familiar with oral supplements, usually drinks once as a snack in the afternoon. Will send vanilla Boost Glucose Control daily.     Anthropometrics    Height: 5' 2" (157.5 cm) Height Method: Stated  Last Weight: 75.6 kg (166 lb 11.2 oz) (23 0657) Weight Method: Standard Scale  BMI (Calculated): 30.5  BMI Classification: obese grade I (BMI 30-34.9)     Ideal Body Weight (IBW), Female: 110 lb     % Ideal Body Weight, Female (lb): 154.55 %                    Usual Body Weight (UBW), k kg  % Usual Body Weight: 94.72     Usual Weight Provided By: patient    Wt Readings from Last 15 Encounters:   23 75.6 kg (166 lb 11.2 oz)   23 77.1 kg (170 lb)   23 76.2 kg (168 lb)   23 77.1 kg (169 lb 15.6 oz)   23 77.1 kg (170 lb)   23 81.2 kg (179 lb 0.2 oz)   23 81.2 kg (179 lb 0.2 oz)   23 81.2 kg (179 lb 0.2 oz)   23 81.2 kg (179 lb 0.2 oz)   23 81.2 kg (179 lb 0.2 oz)   23 81.2 kg (179 lb)   23 81.2 kg (179 lb)   23 " 82.1 kg (181 lb)   23 82.1 kg (181 lb)   22 82.1 kg (181 lb)     Weight Change(s) Since Admission:  Admit Weight: 77.1 kg (170 lb) (23 0130)  23 75.6kg wt. Reported UBW ~80kg 1 month ago indicating 5.5% wt loss ~1 month.    Estimated Needs    Weight Used For Calorie Calculations: 75.6 kg (166 lb 10.7 oz)  Energy Calorie Requirements (kcal): 1456kcals/d (MSJ x 1.2SF)  Energy Need Method: [place]-St Jeor  Weight Used For Protein Calculations: 75.6 kg (166 lb 10.7 oz)  Protein Requirements: 60g/d (0.8g/kg)  Fluid Requirements (mL): 1456ml fl/d (1ml/kcal)  Temp (24hrs), Av °F (36.7 °C), Min:97.6 °F (36.4 °C), Max:98.4 °F (36.9 °C)       Enteral Nutrition    Patient not receiving enteral nutrition at this time.    Parenteral Nutrition    Patient not receiving parenteral nutrition support at this time.    Evaluation of Received Nutrient Intake    Calories: not meeting estimated needs  Protein: not meeting estimated needs    Patient Education    Not applicable.    Nutrition Diagnosis     PES: Malnutrition related to acute illness as evidenced by </=75% EER >7 days, >5% wt loss x1 month. (new)    Interventions/Goals     Intervention(s): modified composition of meals/snacks, commercial beverage, and collaboration with other providers  Goal: Meet greater than 75% of nutritional needs by follow-up. (new)    Monitoring & Evaluation     Dietitian will monitor energy intake, weight change, electrolyte/renal panel, and glucose/endocrine profile.  Nutrition Risk/Follow-Up: high (follow-up in 1-4 days)   Please consult if re-assessment needed sooner.

## 2023-06-29 NOTE — NURSING
Entered pt's room with Dr Dougherty, Pt not in room, heart monitor and gown found on bathroom floor, pt's cell phone called, states she has left the hospital with her 2 nieces and that she hates this place. Informed pt that she needed to come back to hospital and remove her IV Line. Verbalized understanding.

## 2023-06-29 NOTE — PROGRESS NOTES
Cardiology Daily Progress Note    Patient Name: Rahel Bagley  Age: 73 y.o.  : 1950  MRN: [id number]  Admission Date: 2023      Subjective: No acute cardiac events overnight. Patient feels much improved post PCI.     SrCrt increased today post LHC.       Review of Systems   General ROS: negative.  Respiratory ROS: no cough, shortness of breath, or wheezing.  Cardiovascular ROS: no chest pain or dyspnea on exertion.  Gastrointestinal ROS: no abdominal pain, change in bowel habits, or black or bloody stools.  Genito-Urinary ROS: no dysuria, trouble voiding, or hematuria.  Musculoskeletal ROS: negative.  Neurological ROS: negative.      Health Status:  Review of patient's allergies indicates:   Allergen Reactions    Fish containing products Anaphylaxis    Shellfish containing products Anaphylaxis    Statins-hmg-coa reductase inhibitors Hives and Swelling    Phenergan [promethazine] Hives    Wool Blisters    Iodine and iodide containing products Rash     Iodine and seafood allergy    Onion Nausea And Vomiting and Other (See Comments)     High Blood pressure and Headaches    Opioids - morphine analogues Nausea And Vomiting    Tetanus vaccines and toxoid Nausea And Vomiting    Zofran [ondansetron hcl] Nausea And Vomiting       Current Facility-Administered Medications   Medication Dose Route Frequency Provider Last Rate Last Admin    acetaminophen tablet 650 mg  650 mg Oral Q6H PRN Khris Oconnor MD   650 mg at 23 2255    aluminum-magnesium hydroxide-simethicone 200-200-20 mg/5 mL suspension 30 mL  30 mL Oral QID PRN Khris Oconnor MD        carvediloL tablet 25 mg  25 mg Oral BID Isaac Rubin MD   25 mg at 23 0955    dextrose 10% bolus 125 mL 125 mL  12.5 g Intravenous PRN Isaac Rubin MD        dextrose 10% bolus 250 mL 250 mL  25 g Intravenous PRN Isaac Rubin MD        diphenhydrAMINE capsule 50 mg  50 mg Oral Q6H PRN Isaac Rubin MD   50 mg at 23 5178     enoxaparin injection 80 mg  1 mg/kg Subcutaneous Q24H (treatment, non-standard time) Khris Oconnor MD   80 mg at 06/27/23 1714    gabapentin capsule 300 mg  300 mg Oral BID Isaac Rubin MD   300 mg at 06/29/23 0955    glucagon (human recombinant) injection 1 mg  1 mg Intramuscular PRN Isaac Rubin MD        glucose chewable tablet 16 g  16 g Oral PRN Isaac Rubin MD        glucose chewable tablet 24 g  24 g Oral PRN Isaac Rubin MD        hydrALAZINE injection 20 mg  20 mg Intravenous Q2H PRTYLER Oconnor MD   20 mg at 06/28/23 0919    hydrALAZINE tablet 25 mg  25 mg Oral BID Isaac Rubin MD   25 mg at 06/29/23 0955    insulin aspart U-100 injection 1-10 Units  1-10 Units Subcutaneous QID (AC + HS) PRN Isaac Rubin MD   8 Units at 06/28/23 1733    insulin detemir U-100 injection 30 Units  30 Units Subcutaneous QHS Isaac Rubin MD        labetaloL injection 20 mg  20 mg Intravenous Q2H PRN Khris Oconnor MD        levothyroxine tablet 88 mcg  88 mcg Oral Daily Isaac Rubin MD   88 mcg at 06/29/23 0551    melatonin tablet 6 mg  6 mg Oral Nightly PRN Khris Oconnor MD        naloxone 0.4 mg/mL injection 0.02 mg  0.02 mg Intravenous PRN Khris Oconnor MD        nitroGLYCERIN 2% TD oint ointment 1 inch  1 inch Topical (Top) Q6H Khris Oconnor MD        polyethylene glycol packet 17 g  17 g Oral BID PRN Khris Oconnor MD   17 g at 06/27/23 0938    simethicone chewable tablet 80 mg  1 tablet Oral QID PRN Khris Oconnor MD        SITagliptin phosphate tablet 25 mg  25 mg Oral Daily Isaac Rubin MD   25 mg at 06/28/23 1733    sodium chloride 0.9% flush 10 mL  10 mL Intravenous Q6H Isaac Rubin MD   10 mL at 06/28/23 2355    And    sodium chloride 0.9% flush 10 mL  10 mL Intravenous PRN Isaac Rubin MD           Objective:  Patient Vitals for the past 24 hrs:   BP Temp Temp src Pulse Resp SpO2   06/29/23 0955 133/62 -- -- -- -- --   06/29/23 0749  133/62 98 °F (36.7 °C) Oral 67 18 99 %   06/29/23 0320 (!) 149/61 98.1 °F (36.7 °C) Oral 69 18 97 %   06/28/23 2316 (!) 144/60 98.4 °F (36.9 °C) Oral 65 18 97 %   06/28/23 2112 (!) 163/62 -- -- -- -- --   06/28/23 1928 (!) 163/62 97.6 °F (36.4 °C) Oral 66 18 99 %   06/28/23 1739 (!) 158/66 -- -- -- -- --   06/28/23 1550 (!) 148/59 97.9 °F (36.6 °C) Oral 66 18 98 %   06/28/23 1118 105/70 97.9 °F (36.6 °C) Oral 90 18 100 %     Recent Results (from the past 24 hour(s))   ISTAT ACT-K    Collection Time: 06/28/23 10:51 AM   Result Value Ref Range    POC ACTIVATED CLOTTING TIME K 221 (H) 74 - 137 sec    Sample unknown    ISTAT ACT-K    Collection Time: 06/28/23 12:26 PM   Result Value Ref Range    POC ACTIVATED CLOTTING TIME K 185 (H) 74 - 137 sec    Sample unknown    POCT glucose    Collection Time: 06/28/23  4:29 PM   Result Value Ref Range    POCT Glucose 339 (H) 70 - 110 mg/dL   POCT glucose    Collection Time: 06/28/23  9:12 PM   Result Value Ref Range    POCT Glucose 205 (H) 70 - 110 mg/dL   CBC with Differential    Collection Time: 06/29/23 12:59 AM   Result Value Ref Range    WBC 8.36 4.50 - 11.50 x10(3)/mcL    RBC 3.95 (L) 4.20 - 5.40 x10(6)/mcL    Hgb 11.1 (L) 12.0 - 16.0 g/dL    Hct 34.3 (L) 37.0 - 47.0 %    MCV 86.8 80.0 - 94.0 fL    MCH 28.1 27.0 - 31.0 pg    MCHC 32.4 (L) 33.0 - 36.0 g/dL    RDW 13.9 11.5 - 17.0 %    Platelet 238 130 - 400 x10(3)/mcL    MPV 10.5 (H) 7.4 - 10.4 fL    Neut % 79.9 %    Lymph % 12.3 %    Mono % 7.3 %    Eos % 0.0 %    Basophil % 0.1 %    Lymph # 1.03 0.6 - 4.6 x10(3)/mcL    Neut # 6.68 2.1 - 9.2 x10(3)/mcL    Mono # 0.61 0.1 - 1.3 x10(3)/mcL    Eos # 0.00 0 - 0.9 x10(3)/mcL    Baso # 0.01 <=0.2 x10(3)/mcL    IG# 0.03 0 - 0.04 x10(3)/mcL    IG% 0.4 %    NRBC% 0.0 %   Comprehensive Metabolic Panel    Collection Time: 06/29/23  5:28 AM   Result Value Ref Range    Sodium Level 139 136 - 145 mmol/L    Potassium Level 4.2 3.5 - 5.1 mmol/L    Chloride 108 (H) 98 - 107 mmol/L    Carbon  Dioxide 24 23 - 31 mmol/L    Glucose Level 160 (H) 82 - 115 mg/dL    Blood Urea Nitrogen 39.5 (H) 9.8 - 20.1 mg/dL    Creatinine 1.70 (H) 0.55 - 1.02 mg/dL    Calcium Level Total 9.8 8.4 - 10.2 mg/dL    Protein Total 6.3 5.8 - 7.6 gm/dL    Albumin Level 3.0 (L) 3.4 - 4.8 g/dL    Globulin 3.3 2.4 - 3.5 gm/dL    Albumin/Globulin Ratio 0.9 (L) 1.1 - 2.0 ratio    Bilirubin Total 0.4 <=1.5 mg/dL    Alkaline Phosphatase 63 40 - 150 unit/L    Alanine Aminotransferase 9 0 - 55 unit/L    Aspartate Aminotransferase 22 5 - 34 unit/L    eGFR 32 mls/min/1.73/m2     [unfilled]  Wt Readings from Last 3 Encounters:   06/28/23 75.6 kg (166 lb 11.2 oz)   06/25/23 77.1 kg (170 lb)   06/14/23 76.2 kg (168 lb)       Physical Exam:  General: Alert and oriented, no acute distress.  Neck: No carotid bruit, no jugular venous distention.  Respiratory: Breath sounds are equal, symmetrical chest wall expansion. Breath sounds are clear, on room air .  Cardiovascular: Normal rate, paced rhythm. No murmur. No gallop. No edema noted. Patient is paced on tele.  Integumentary: Clean, warm, dry, and intact right groin incision.  Neurologic: Alert and oriented.   Psychiatric: Cooperative, appropriate mood and affect.        Assessment/Plan:    NSTEMI with known CAD and prior CABG and PCI  - now s/p angiogram on 6- with PCI to ostial to proximal circumflex  - normal EDP  - start Aspirin and Plavix today   - continue beta blocker therapy; Repatha to be continued outpatient    2. PAF with prior MAZE and TAMMY ligation  - continue medical therapy   - monitor on telemetry    3. VINCENT  - SrCrt elevated on admission but had improved --> now up today  - will encourage PO hydration today  - labs in the morning    4. HFrEF, chronic; post Bi-V ICD implant  - continue GDMT with beta blocker   - consider escalation in medical therapy but SrCrt elevated today  - monitor volume status closely    5. VHD, post rosa clip  - aspirin therapy  - outpatient follow  up      *Patient of Dr. Seals.       ISH Manning, FNP-C  [facility]

## 2023-06-30 NOTE — CARE UPDATE
Patient left the hospital room by the time we had a plan on the patient.  Staff was informed about this, staff reported that they removed the midline finally from the patient  downstairs.

## 2023-06-30 NOTE — PHYSICIAN QUERY
PT Name: Rahel Bagley  MR #: [id number]    DOCUMENTATION CLARIFICATION     CDS/: Jessica CoyleRN               Contact information: [email or web address]    This form is a permanent document in the medical record.     Query Date: 2023    By submitting this query, we are merely seeking further clarification of documentation.. Please utilize your independent clinical judgment when addressing the question(s) below.    The medical record contains the following:   Indicators  Supporting Clinical Findings Location in Medical Record   x Energy Intake less than 75% for greater than 7 days   Nutrition PN    x Weight Loss greater than 5% in 1 month   Nutrition PN     Fat Loss      Muscle Loss      Edema/Fluid Accumulation      Reduced  Strength (by dynamometer)     x Weight, BMI, Usual Body Weight Last Weight: 75.6 kg (166 lb 11.2 oz) (23 0657) Weight Method: Standard Scale  BMI (Calculated): 30.5  Usual Body Weight (UBW), k kg  % Usual Body Weight: 94.72   Nutrition PN     Delayed Wound Healing     x Registered Dietician Diagnosis Malnutrition Level: moderate  PES: Malnutrition related to acute illness as evidenced by </=75% EER >7 days, >5% wt loss x1 month. (new) Nutrition PN          x Acute or Chronic Illness  acute illness or injury   Nutrition PN     Social or Environmental Circumstances     x Treatment Nutrition Recommendation/Prescription     -Continue heart healthy diet. Monitor CBGs for need to add diabetic diet restriction.  -Add vanilla Boost Glucose Control (provides 190 kcal, 16 g protein per serving) once daily. Nutrition PN     Other       Academy of Nutrition and Dietetics (Academy) and the American Society for Parenteral and Enteral Nutrition (A.S.P.E.N.) Clinical Characteristics to support Malnutrition   Malnutrition in the Context of Acute Illness or Injury Malnutrition in the Context of Chronic Illness or Injury Malnutrition in the Context of  Social or Environmental Circumstances   Malnutrition Level Moderate Severe Moderate Severe   Moderate   Severe   Energy Intake <75%                   >7 days <50%                 >5 days <75%           >1 month <75%                      >1 month   <75% for >3 months   <50% for >1 month   Weight Loss   1-2% in 1 week >2% in 1 week 5% in 1 month >5% in 1 month 5% in 1 month >5% in 1 month    5% in 1 month >5% in 1 month 7.5% in 3 months >7.5% in 3 months 7.5% in 3 months >7.5% in 3 months    7.5% in 3 months >7.5% in 3 months 10% in 6 months >10% in 6 months 10% in 6 months >10% in 6 months        20% in 1 year                    >20% in 1 year                                                                  20% in 1 year                            >20% in 1 year                                                  Subcutaneous Fat Loss Mild  Moderate  Mild  Severe    Mild   Severe   Muscle Loss Mild  Moderate  Mild  Severe    Mild   Severe   Edema/Fluid Accumulation Mild Moderate to severe  Mild  Severe   Mild   Severe   Reduced  Strength         (based on standards supplied by  of dynamometer) N/A Measurably reduced N/A Measurably reduced N/A Measurably reduced     Criteria for mild malnutrition is defined as 1 characteristic outlined above within the established moderate or severe parameters.  A minimum of 2 out of the 6 characteristics noted above are recommended for a diagnosis of moderate or severe malnutrition.  Chronic illness/injury is a disease/condition lasting 3 months or longer.    The noted clinical guidelines are only system guidelines and do not replace the providers clinical judgment.    Provider, please specify diagnosis or diagnoses associated with above clinical findings.    [ X ] Moderate Malnutrition - a minimum of 2 of the 6 moderate malnutrition characteristics noted above      [  ] Other Nutritional Diagnosis (please specify): _______         Please document in your progress notes  daily for the duration of treatment until resolved and  include in your discharge summary.      References:    JEFF Zuniga, & JUAN MIGUEL Mirza (2022, April). Assessment and management of anorexia and cachexia in palliative care. Retrieved May 23, 2022, from [email or web address].[email or web address]/assessment-and-management-[email or web address]-anorexia-[email or web address]-cachexia-in-palliative-care?[email or web address]&source=see_link     MIGUEL Pearson, PhD, RD, Jocelynn VICTORIA P., PhD, RN, KAREN Perdomo MD, PhD, Fernando BOYD A., MS, RD, [facility], KAJAL Casarez, MS, RD, The Academy Malnutrition Work Group, The A.S.P.E.N. Board of Directors. (2012). Consensus Statement: Academy of Nutrition and Dietetics and American Society for Parenteral and Enteral Nutrition: Characteristics Recommended for the Identification and Documentation of Adult Malnutrition (Undernutrition). Journal of Parenteral and Enteral Nutrition, 36(3), 275-283. doi:10.1177/2838756469047732     Form No. 01950

## 2023-06-30 NOTE — PHYSICIAN QUERY
PT Name: Rahel Bagley  MR #: [id number]     DOCUMENTATION CLARIFICATION      CDS/: Jessica CoyleRN               Contact information: [email or web address]  This form is a permanent document in the medical record.     Query Date: June 30, 2023    By submitting this query, we are merely seeking further clarification of documentation to reflect the severity of illness of your patient. Please utilize your independent clinical judgment when addressing the question(s) below.     The Medical Record contains the following:   Indicators   Supporting Clinical Findings Location in Medical Record   x Chest Pain, Angina Cardiovascular:  Negative for chest pain, palpitations and leg swelling.    Dizziness has resolved. She says that her previous MI she did not have any symptoms of chest pains. She at this time is denying chest pains   ED Prov Note 6/26      Cards Consult 6/27       x Coronary Artery Disease NSTEMI with known CAD and prior CABG and PCI Cards PN 6/29     x EKG EKG:  AV dual-paced rhythm   Abnormal ECG    Atrial-sensed ventricular-paced rhythm   Abnormal ECG     AV dual-paced rhythm   Abnormal ECG     AV dual-paced rhythm with prolonged AV conduction   Abnormal ECG  EKG 6/25        EKG 6/26      EKG 6/27      EKG 6/28       x Troponin  06/26/23 04:03 06/26/23 11:41 06/26/23 17:17 06/27/23 00:24   Troponin I 2.066 (H) 1.512 (H) 1.197 (H) 0.728 (H)    Labs 6/26-6/27            Echo Results     x Angiography Cath Report:    The SVG to OM Graft was 100% stenosed.    The Prox RCA lesion was 65% stenosed.    The RPDA lesion was 50% stenosed.    The Ost Cx to Prox Cx Stent was 95% stenosed with 0% stenosis post-intervention.    The left ventricular end diastolic pressure was normal.    The estimated blood loss was none.   Cardiac Cath 6/28   x Documentation of acute cardiac condition 72 y/o female with PMHx of DM, obesity, CKD, MI, A-fib,  HTN, HLD, Hypercholesteremia, and hypothyroidism presents to ED as a  transfer from [place] for NSTEMI    NSTEMI, likely type 2    Non-ST segment elevation MI  Patient currently volume depleted but patient symptoms similar to prestent/pre-CABG symptoms in the past.  At some point will need angiography for reevaluation but will hold off for time being due to elevated kidney functions.  If no improvement with  hydration would consider renal consult  Add nitrates/lovenox  Monitor h/h    NSTEMI with known CAD and prior CABG and PCI   - now s/p angiogram on 6- with PCI to ostial to proximal circumflex   - normal EDP   - start Aspirin and Plavix today   - continue beta blocker therapy; Repatha to be continued outpatient   ED Prov Note 6/26        H&P 6/26, PN 6/27, PN 6/28    Cards Consult 6/26                  Cards PN 6/29       x Medication/Treatment Bivalarudin 250 mg in 50 ml IV  Aspirin 81 mg PO QD  Plavix 75 mg PO QD  NTG 1 inch Q 6 hrs   Carvedilol 25 mg PO BID   MAR 6/28  MAR 6/29  MAR 6/29  MAR 6/26-6/29  MAR 6/26-6/29    Other        Provider, due to conflicting documentation, please clarify the NSTEMI  diagnosis related to the above documentation:    [  X ] NSTEMI     [   ] NSTEMI/Myocardial Infarction Type 2 due to (please specify): ______________     [   ] Other Cardiac Diagnosis (please specify): _______________           Please document in your progress notes daily for the duration of treatment until resolved, and include in your discharge summary.    Form No. 60984

## 2023-07-09 NOTE — DISCHARGE SUMMARY
[facility] Hospital Medicine Discharge Summary    Admit Date: 6/26/2023  Discharge Date and Time: 6/29/23 (Left AMA)  Admitting Physician:  Team  Discharging Physician: Yolanda Dougherty MD.  Primary Care Physician: Marla Taveras MD  Consults:  Cardiology    Discharge Diagnoses:  NSTEMI with known CAD and prior CABG and PCI  VINCENT  PAF with prior MAZE and TAMMY ligation  HFrEF, chronic; post Bi-V ICD implant  VHD post Nicole clip    Hospital Course:   Rahel Bagley is a 73 y.o. female with a past medical history of essential hypertension, hyperlipidemia, CAD, PAF, MI, CKD, diabetes mellitus type 2, hypothyroidism, osteoarthritis, and obesity presented to [facility] on 6/26/2023 transferred from [place] for NSTEMI.  Patient presented to outside facility for dizziness.  Patient reported feeling lightheaded, nauseous,  and near syncopal with floaters in her vision.  Patient reported symptoms were worse with head position better with rest.  Patient denied chest pain, palpitations, shortness of breath, abdominal pain, vomiting, diarrhea, fever, chills, dysuria, hematuria, rectal bleeding, and dark stools.   Workup at outside facility revealed WBC 5.76, RBC 3.86, hemoglobin 10.6, hematocrit 33.4, chloride 95, CO2 34, BUN 43, creatinine 2.28, glucose 158, , POC troponin 0.92, and D-dimer 16.90.  EKG revealed paced rhythm with heart rate of 67 beats per minute.  Chest x-ray revealed no acute cardiopulmonary process identified.  CT head revealed no acute intracranial process identified.     Cardiology was consulted with recommendations of single dose Lovenox and transfer to [facility] ER for higher level of care.  V/Q scan and bilateral lower extremity venous ultrasounds were ordered upon arrival. Patient was admitted to hospital medicine service for further medical management.     Cardiology followed the case, underwent Angiogram on 6/28/23, adjusting medications, advised patient of  importance of compliance with dual antiplatelet therapy.Mild elevation in Cr noted , consultants recommend encourage hydration.     Patient left AMA without official discharge orders and medication recommendations. The nurse was notified by the writer (MD) that patient was not in her room. The nurse then was able to track the patient and went downstairs to remove the IV line from the patient.       Pt was seen and examined on the day of patient leaving AMA  Vitals:  VITAL SIGNS: 24 HRS MIN & MAX LAST   No data recorded 98 °F (36.7 °C)   No data recorded 136/69   No data recorded  67   No data recorded 18   No data recorded 98 %       Physical Exam:  General: in no acute distress  HENT: normocephalic, atraumatic  Eye: PERRL, EOMI, clear conjunctiva  Neck: full ROM, no thyromegaly, no JVD  Respiratory: clear to auscultation bilaterally  Cardiovascular: regular rate and rhythm  Gastrointestinal: non-distended, positive bowel sounds, non-tender  Musculoskeletal: no gross deformity  Integumentary: warm, dry, intact, no rashes  Neurological: cranial nerves grossly intact, no focal neurological deficit  Psychiatric: cooperative    Procedures Performed: see full chart, Angiogram on 6/28/23    Significant Diagnostic Studies: See Full reports for all details    No results for input(s): WBC, RBC, HGB, HCT, MCV, MCH, MCHC, RDW, PLT, MPV, GRAN, LYMPH, MONO, BASO, NRBC in the last 168 hours.    No results for input(s): NA, K, CL, CO2, ANIONGAP, BUN, CREATININE, GLU, CALCIUM, PH, MG, ALBUMIN, PROT, ALKPHOS, ALT, AST, BILITOT in the last 168 hours.     Microbiology Results (last 7 days)       ** No results found for the last 168 hours. **             Cardiac catheterization    The SVG to OM Graft was 100% stenosed.    The Prox RCA lesion was 65% stenosed.    The RPDA lesion was 50% stenosed.    The Ost Cx to Prox Cx Stent was 95% stenosed with 0% stenosis   post-intervention.    The left ventricular end diastolic pressure was  normal.    The estimated blood loss was none.    The procedure log was documented by No documenter listed and verified by   Khris Oconnor MD.    Date: 6/28/2023  Time: 8:50 AM         Medication List        ASK your doctor about these medications      carvediloL 6.25 MG tablet  Commonly known as: COREG     FARXIGA 10 mg tablet  Generic drug: dapagliflozin propanediol     fenofibrate micronized 200 MG Cap  Commonly known as: LOFIBRA     gabapentin 300 MG capsule  Commonly known as: NEURONTIN     hydrALAZINE 25 MG tablet  Commonly known as: APRESOLINE     JANUVIA 25 MG Tab  Generic drug: SITagliptin phosphate     levothyroxine 88 mcg Cap  Commonly known as: TIROSINT     metOLazone 2.5 MG tablet  Commonly known as: ZAROXOLYN     REPATHA SURECLICK 140 mg/mL Pnij  Generic drug: evolocumab     torsemide 20 MG Tab  Commonly known as: DEMADEX               Explained in detail to the patient about the discharge plan, medications, and follow-up visits. Pt understands and agrees with the treatment plan  Discharge Disposition: Left Against Medical Advice   Discharged Condition: stable  Diet-    Medications Per DC med rec  Activities as tolerated    For further questions contact hospitalist office    Discharge time 33 minutes    For worsening symptoms, chest pain, shortness of breath, increased abdominal pain, high grade fever, stroke or stroke like symptoms, immediately go to the nearest Emergency Room or call 911 as soon as possible.      Yolanda Zhang M.D, on 7/9/2023. at 3:15 PM.

## 2023-07-09 NOTE — PROGRESS NOTES
[facility] Hospital Medicine Discharge Summary    Admit Date: 6/26/2023  Discharge Date and Time: 6/29/23 (Left AMA)  Admitting Physician:  Team  Discharging Physician: Yolanda Dougherty MD.  Primary Care Physician: Marla Taveras MD  Consults:  Cardiology    Discharge Diagnoses:  NSTEMI with known CAD and prior CABG and PCI  VINCENT  PAF with prior MAZE and TAMMY ligation  HFrEF, chronic; post Bi-V ICD implant  VHD post Nicole clip    Hospital Course:   Rahel Bagley is a 73 y.o. female with a past medical history of essential hypertension, hyperlipidemia, CAD, PAF, MI, CKD, diabetes mellitus type 2, hypothyroidism, osteoarthritis, and obesity presented to [facility] on 6/26/2023 transferred from [place] for NSTEMI.  Patient presented to outside facility for dizziness.  Patient reported feeling lightheaded, nauseous,  and near syncopal with floaters in her vision.  Patient reported symptoms were worse with head position better with rest.  Patient denied chest pain, palpitations, shortness of breath, abdominal pain, vomiting, diarrhea, fever, chills, dysuria, hematuria, rectal bleeding, and dark stools.   Workup at outside facility revealed WBC 5.76, RBC 3.86, hemoglobin 10.6, hematocrit 33.4, chloride 95, CO2 34, BUN 43, creatinine 2.28, glucose 158, , POC troponin 0.92, and D-dimer 16.90.  EKG revealed paced rhythm with heart rate of 67 beats per minute.  Chest x-ray revealed no acute cardiopulmonary process identified.  CT head revealed no acute intracranial process identified.     Cardiology was consulted with recommendations of single dose Lovenox and transfer to [facility] ER for higher level of care.  V/Q scan and bilateral lower extremity venous ultrasounds were ordered upon arrival. Patient was admitted to hospital medicine service for further medical management.     Cardiology followed the case, underwent Angiogram on 6/28/23, adjusting medications, advised patient of  importance of compliance with dual antiplatelet therapy.Mild elevation in Cr noted , consultants recommend encourage hydration.     Patient left AMA without official discharge orders and medication recommendations. The nurse was notified by the writer (MD) that patient was not in her room. The nurse then was able to track the patient and went downstairs to remove the IV line from the patient.       Pt was seen and examined on the day of patient leaving AMA  Vitals:  VITAL SIGNS: 24 HRS MIN & MAX LAST   No data recorded 98 °F (36.7 °C)   No data recorded 136/69   No data recorded  67   No data recorded 18   No data recorded 98 %       Physical Exam:  General: in no acute distress  HENT: normocephalic, atraumatic  Eye: PERRL, EOMI, clear conjunctiva  Neck: full ROM, no thyromegaly, no JVD  Respiratory: clear to auscultation bilaterally  Cardiovascular: regular rate and rhythm  Gastrointestinal: non-distended, positive bowel sounds, non-tender  Musculoskeletal: no gross deformity  Integumentary: warm, dry, intact, no rashes  Neurological: cranial nerves grossly intact, no focal neurological deficit  Psychiatric: cooperative    Procedures Performed: see full chart, Angiogram on 6/28/23    Significant Diagnostic Studies: See Full reports for all details    No results for input(s): WBC, RBC, HGB, HCT, MCV, MCH, MCHC, RDW, PLT, MPV, GRAN, LYMPH, MONO, BASO, NRBC in the last 168 hours.    No results for input(s): NA, K, CL, CO2, ANIONGAP, BUN, CREATININE, GLU, CALCIUM, PH, MG, ALBUMIN, PROT, ALKPHOS, ALT, AST, BILITOT in the last 168 hours.     Microbiology Results (last 7 days)       ** No results found for the last 168 hours. **             Cardiac catheterization    The SVG to OM Graft was 100% stenosed.    The Prox RCA lesion was 65% stenosed.    The RPDA lesion was 50% stenosed.    The Ost Cx to Prox Cx Stent was 95% stenosed with 0% stenosis   post-intervention.    The left ventricular end diastolic pressure was  normal.    The estimated blood loss was none.    The procedure log was documented by No documenter listed and verified by   Khris Oconnor MD.    Date: 6/28/2023  Time: 8:50 AM         Medication List        ASK your doctor about these medications      carvediloL 6.25 MG tablet  Commonly known as: COREG     FARXIGA 10 mg tablet  Generic drug: dapagliflozin propanediol     fenofibrate micronized 200 MG Cap  Commonly known as: LOFIBRA     gabapentin 300 MG capsule  Commonly known as: NEURONTIN     hydrALAZINE 25 MG tablet  Commonly known as: APRESOLINE     JANUVIA 25 MG Tab  Generic drug: SITagliptin phosphate     levothyroxine 88 mcg Cap  Commonly known as: TIROSINT     metOLazone 2.5 MG tablet  Commonly known as: ZAROXOLYN     REPATHA SURECLICK 140 mg/mL Pnij  Generic drug: evolocumab     torsemide 20 MG Tab  Commonly known as: DEMADEX               Explained in detail to the patient about the discharge plan, medications, and follow-up visits. Pt understands and agrees with the treatment plan  Discharge Disposition: Left Against Medical Advice   Discharged Condition: stable  Diet-    Medications Per DC med rec  Activities as tolerated    For further questions contact hospitalist office    Discharge time 33 minutes    For worsening symptoms, chest pain, shortness of breath, increased abdominal pain, high grade fever, stroke or stroke like symptoms, immediately go to the nearest Emergency Room or call 911 as soon as possible.      Yolanda Zhang M.D, on 7/9/2023. at 3:15 PM.

## 2023-07-13 ENCOUNTER — LAB VISIT (OUTPATIENT)
Dept: LAB | Facility: HOSPITAL | Age: 73
End: 2023-07-13
Attending: INTERNAL MEDICINE
Payer: MEDICARE

## 2023-07-13 DIAGNOSIS — I10 ESSENTIAL HYPERTENSION, MALIGNANT: ICD-10-CM

## 2023-07-13 DIAGNOSIS — E78.5 HYPERLIPIDEMIA, UNSPECIFIED HYPERLIPIDEMIA TYPE: Primary | ICD-10-CM

## 2023-07-13 LAB
ALBUMIN SERPL-MCNC: 2.9 G/DL (ref 3.4–4.8)
ALBUMIN/GLOB SERPL: 0.7 RATIO (ref 1.1–2)
ALP SERPL-CCNC: 68 UNIT/L (ref 40–150)
ALT SERPL-CCNC: 11 UNIT/L (ref 0–55)
AST SERPL-CCNC: 19 UNIT/L (ref 5–34)
BILIRUBIN DIRECT+TOT PNL SERPL-MCNC: 0.8 MG/DL
BUN SERPL-MCNC: 34.3 MG/DL (ref 9.8–20.1)
CALCIUM SERPL-MCNC: 10.6 MG/DL (ref 8.4–10.2)
CHLORIDE SERPL-SCNC: 105 MMOL/L (ref 98–107)
CHOLEST SERPL-MCNC: 141 MG/DL
CHOLEST/HDLC SERPL: 4 {RATIO} (ref 0–5)
CO2 SERPL-SCNC: 31 MMOL/L (ref 23–31)
CREAT SERPL-MCNC: 1.55 MG/DL (ref 0.55–1.02)
ERYTHROCYTE [DISTWIDTH] IN BLOOD BY AUTOMATED COUNT: 13.1 % (ref 11.5–17)
GFR SERPLBLD CREATININE-BSD FMLA CKD-EPI: 35 MLS/MIN/1.73/M2
GLOBULIN SER-MCNC: 4.4 GM/DL (ref 2.4–3.5)
GLUCOSE SERPL-MCNC: 140 MG/DL (ref 82–115)
HCT VFR BLD AUTO: 30.1 % (ref 37–47)
HDLC SERPL-MCNC: 34 MG/DL (ref 35–60)
HGB BLD-MCNC: 9.5 G/DL (ref 12–16)
LDLC SERPL CALC-MCNC: 90 MG/DL (ref 50–140)
MCH RBC QN AUTO: 28.2 PG (ref 27–31)
MCHC RBC AUTO-ENTMCNC: 31.6 G/DL (ref 33–36)
MCV RBC AUTO: 89.3 FL (ref 80–94)
PLATELET # BLD AUTO: 241 X10(3)/MCL (ref 130–400)
PMV BLD AUTO: 9.3 FL (ref 7.4–10.4)
POTASSIUM SERPL-SCNC: 4.4 MMOL/L (ref 3.5–5.1)
PROT SERPL-MCNC: 7.3 GM/DL (ref 5.8–7.6)
RBC # BLD AUTO: 3.37 X10(6)/MCL (ref 4.2–5.4)
SODIUM SERPL-SCNC: 142 MMOL/L (ref 136–145)
T4 SERPL-MCNC: 9.93 UG/DL (ref 4.87–11.72)
TRIGL SERPL-MCNC: 85 MG/DL (ref 37–140)
TSH SERPL-ACNC: 0.72 UIU/ML (ref 0.35–4.94)
VLDLC SERPL CALC-MCNC: 17 MG/DL
WBC # SPEC AUTO: 4.7 X10(3)/MCL (ref 4.5–11.5)

## 2023-07-13 PROCEDURE — 84443 ASSAY THYROID STIM HORMONE: CPT

## 2023-07-13 PROCEDURE — 80053 COMPREHEN METABOLIC PANEL: CPT

## 2023-07-13 PROCEDURE — 85027 COMPLETE CBC AUTOMATED: CPT

## 2023-07-13 PROCEDURE — 84436 ASSAY OF TOTAL THYROXINE: CPT

## 2023-07-13 PROCEDURE — 36415 COLL VENOUS BLD VENIPUNCTURE: CPT

## 2023-07-13 PROCEDURE — 80061 LIPID PANEL: CPT

## 2023-07-21 ENCOUNTER — LAB VISIT (OUTPATIENT)
Dept: LAB | Facility: HOSPITAL | Age: 73
End: 2023-07-21
Attending: PHYSICIAN ASSISTANT
Payer: MEDICARE

## 2023-07-21 DIAGNOSIS — M25.561 RIGHT KNEE PAIN: Primary | ICD-10-CM

## 2023-07-21 DIAGNOSIS — M25.461 SWELLING OF RIGHT KNEE JOINT: ICD-10-CM

## 2023-07-21 DIAGNOSIS — Z96.651 PRESENCE OF RIGHT ARTIFICIAL KNEE JOINT: ICD-10-CM

## 2023-07-21 LAB
BASOPHILS # BLD AUTO: 0.04 X10(3)/MCL
BASOPHILS NFR BLD AUTO: 1 %
CRP SERPL-MCNC: 12.6 MG/L
EOSINOPHIL # BLD AUTO: 0.07 X10(3)/MCL (ref 0–0.9)
EOSINOPHIL NFR BLD AUTO: 1.7 %
ERYTHROCYTE [DISTWIDTH] IN BLOOD BY AUTOMATED COUNT: 13 % (ref 11.5–17)
ERYTHROCYTE [SEDIMENTATION RATE] IN BLOOD: >140 MM/HR (ref 0–20)
HCT VFR BLD AUTO: 26.8 % (ref 37–47)
HGB BLD-MCNC: 8.4 G/DL (ref 12–16)
IMM GRANULOCYTES # BLD AUTO: 0 X10(3)/MCL (ref 0–0.04)
IMM GRANULOCYTES NFR BLD AUTO: 0 %
LYMPHOCYTES # BLD AUTO: 0.72 X10(3)/MCL (ref 0.6–4.6)
LYMPHOCYTES NFR BLD AUTO: 17.8 %
MCH RBC QN AUTO: 28.2 PG (ref 27–31)
MCHC RBC AUTO-ENTMCNC: 31.3 G/DL (ref 33–36)
MCV RBC AUTO: 89.9 FL (ref 80–94)
MONOCYTES # BLD AUTO: 0.44 X10(3)/MCL (ref 0.1–1.3)
MONOCYTES NFR BLD AUTO: 10.9 %
NEUTROPHILS # BLD AUTO: 2.77 X10(3)/MCL (ref 2.1–9.2)
NEUTROPHILS NFR BLD AUTO: 68.6 %
PLATELET # BLD AUTO: 313 X10(3)/MCL (ref 130–400)
PMV BLD AUTO: 10.1 FL (ref 7.4–10.4)
RBC # BLD AUTO: 2.98 X10(6)/MCL (ref 4.2–5.4)
WBC # SPEC AUTO: 4.04 X10(3)/MCL (ref 4.5–11.5)

## 2023-07-21 PROCEDURE — 86140 C-REACTIVE PROTEIN: CPT

## 2023-07-21 PROCEDURE — 85025 COMPLETE CBC W/AUTO DIFF WBC: CPT

## 2023-07-21 PROCEDURE — 85652 RBC SED RATE AUTOMATED: CPT

## 2023-07-21 PROCEDURE — 36415 COLL VENOUS BLD VENIPUNCTURE: CPT

## 2023-08-10 ENCOUNTER — INFUSION (OUTPATIENT)
Dept: INFUSION THERAPY | Facility: HOSPITAL | Age: 73
End: 2023-08-10
Attending: INTERNAL MEDICINE
Payer: MEDICARE

## 2023-08-10 VITALS
TEMPERATURE: 98 F | DIASTOLIC BLOOD PRESSURE: 49 MMHG | HEART RATE: 74 BPM | OXYGEN SATURATION: 97 % | RESPIRATION RATE: 18 BRPM | SYSTOLIC BLOOD PRESSURE: 102 MMHG

## 2023-08-10 DIAGNOSIS — N18.4 ANEMIA OF CHRONIC KIDNEY FAILURE, STAGE 4 (SEVERE): Primary | ICD-10-CM

## 2023-08-10 DIAGNOSIS — D63.1 ANEMIA OF CHRONIC KIDNEY FAILURE, STAGE 4 (SEVERE): Primary | ICD-10-CM

## 2023-08-10 DIAGNOSIS — N18.4 CHRONIC KIDNEY DISEASE, STAGE 4 (SEVERE): ICD-10-CM

## 2023-08-10 LAB
BASOPHILS # BLD AUTO: 0.01 X10(3)/MCL
BASOPHILS NFR BLD AUTO: 0.3 %
EOSINOPHIL # BLD AUTO: 0.02 X10(3)/MCL (ref 0–0.9)
EOSINOPHIL NFR BLD AUTO: 0.6 %
ERYTHROCYTE [DISTWIDTH] IN BLOOD BY AUTOMATED COUNT: 14.4 % (ref 11.5–17)
HCT VFR BLD AUTO: 26.6 % (ref 37–47)
HGB BLD-MCNC: 8 G/DL (ref 12–16)
IMM GRANULOCYTES # BLD AUTO: 0.01 X10(3)/MCL (ref 0–0.04)
IMM GRANULOCYTES NFR BLD AUTO: 0.3 %
LYMPHOCYTES # BLD AUTO: 0.86 X10(3)/MCL (ref 0.6–4.6)
LYMPHOCYTES NFR BLD AUTO: 27.6 %
MCH RBC QN AUTO: 27.2 PG (ref 27–31)
MCHC RBC AUTO-ENTMCNC: 30.1 G/DL (ref 33–36)
MCV RBC AUTO: 90.5 FL (ref 80–94)
MONOCYTES # BLD AUTO: 0.36 X10(3)/MCL (ref 0.1–1.3)
MONOCYTES NFR BLD AUTO: 11.5 %
NEUTROPHILS # BLD AUTO: 1.86 X10(3)/MCL (ref 2.1–9.2)
NEUTROPHILS NFR BLD AUTO: 59.7 %
PLATELET # BLD AUTO: 247 X10(3)/MCL (ref 130–400)
PMV BLD AUTO: 9.7 FL (ref 7.4–10.4)
RBC # BLD AUTO: 2.94 X10(6)/MCL (ref 4.2–5.4)
WBC # SPEC AUTO: 3.12 X10(3)/MCL (ref 4.5–11.5)

## 2023-08-10 PROCEDURE — 63600175 PHARM REV CODE 636 W HCPCS: Mod: JZ,EC,TB | Performed by: INTERNAL MEDICINE

## 2023-08-10 PROCEDURE — 96372 THER/PROPH/DIAG INJ SC/IM: CPT

## 2023-08-10 PROCEDURE — 85025 COMPLETE CBC W/AUTO DIFF WBC: CPT | Performed by: INTERNAL MEDICINE

## 2023-08-10 RX ADMIN — EPOETIN ALFA-EPBX 10000 UNITS: 10000 INJECTION, SOLUTION INTRAVENOUS; SUBCUTANEOUS at 12:08

## 2023-08-11 ENCOUNTER — HOSPITAL ENCOUNTER (EMERGENCY)
Facility: HOSPITAL | Age: 73
Discharge: HOME | End: 2023-08-11
Attending: FAMILY MEDICINE
Payer: MEDICARE

## 2023-08-11 VITALS
BODY MASS INDEX: 29.81 KG/M2 | OXYGEN SATURATION: 97 % | WEIGHT: 163 LBS | HEART RATE: 86 BPM | TEMPERATURE: 98 F | DIASTOLIC BLOOD PRESSURE: 67 MMHG | RESPIRATION RATE: 18 BRPM | SYSTOLIC BLOOD PRESSURE: 165 MMHG

## 2023-08-11 DIAGNOSIS — Z48.89 ENCOUNTER FOR POST SURGICAL WOUND CHECK: Primary | ICD-10-CM

## 2023-08-11 PROCEDURE — 99282 EMERGENCY DEPT VISIT SF MDM: CPT

## 2023-08-11 NOTE — ED PROVIDER NOTES
Encounter Date: 8/11/2023       History     Chief Complaint   Patient presents with    Wound Check     Pt had abcess on right knee incised by [facility] 7/27. States wont stop bleeding. Pt on plavix. Pt also stated that she would like to see wound care here for her wound. Wound approx 1 cm and has serosanguiness drainage. Pt finished prescribed abx     73-year-old presents with a bleeding wound on the right knee says she had an I and D the few days ago lowers the continues to bleed chest change dressings about every 4 hours would like it evaluated and also appointment for a wound care clinic        Review of patient's allergies indicates:   Allergen Reactions    Fish containing products Anaphylaxis    Shellfish containing products Anaphylaxis    Statins-hmg-coa reductase inhibitors Hives and Swelling    Phenergan [promethazine] Hives    Wool Blisters    Iodine and iodide containing products Rash     Iodine and seafood allergy    Onion Nausea And Vomiting and Other (See Comments)     High Blood pressure and Headaches    Opioids - morphine analogues Nausea And Vomiting    Tetanus vaccines and toxoid Nausea And Vomiting    Zofran [ondansetron hcl] Nausea And Vomiting     Past Medical History:   Diagnosis Date    Chronic kidney disease, unspecified     Pt states stage 4    Diabetes     Diverticulitis     Essential (primary) hypertension     Heart attack     Hypercholesteremia     Hypothyroidism     Mixed hyperlipidemia     Nonrheumatic mitral (valve) insufficiency     Obesity, unspecified     Paroxysmal atrial fibrillation     Unspecified osteoarthritis, unspecified site      Past Surgical History:   Procedure Laterality Date    APPENDECTOMY      CORONARY ARTERY BYPASS GRAFT      DEBRIDEMENT Right 12/21/2021    Muscle and/or fascia    HYSTERECTOMY      INSERTION OF PACEMAKER      KNEE SURGERY      LEFT HEART CATHETERIZATION Left 6/28/2023    Procedure: Left heart cath;  Surgeon: Khris Oconnor MD;  Location: [facility] CATH LAB;   Service: Cardiology;  Laterality: Left;    MITRAL VALVE SURGERY      SHOULDER SURGERY      Stent in heart       STENT, DRUG ELUTING, SINGLE VESSEL, CORONARY  6/28/2023    Procedure: Stent, Drug Eluting, Single Vessel, Coronary;  Surgeon: Khris Oconnor MD;  Location: [facility] CATH LAB;  Service: Cardiology;;    TONSILLECTOMY       Family History   Problem Relation Age of Onset    Heart attack Mother     Heart attacks under age 50 Father     Cancer Sister      Social History     Tobacco Use    Smoking status: Never    Smokeless tobacco: Never   Substance Use Topics    Alcohol use: Not Currently    Drug use: Never     Review of Systems   Constitutional:  Negative for fever.   HENT:  Negative for sore throat.    Respiratory:  Negative for shortness of breath.    Cardiovascular:  Negative for chest pain.   Gastrointestinal:  Negative for nausea.   Genitourinary:  Negative for dysuria.   Musculoskeletal:  Negative for back pain.   Skin:  Positive for wound. Negative for rash.   Neurological:  Negative for weakness.   Hematological:  Does not bruise/bleed easily.   All other systems reviewed and are negative.      Physical Exam     Initial Vitals [08/11/23 1543]   BP Pulse Resp Temp SpO2   (!) 165/67 86 18 98.2 °F (36.8 °C) 97 %      MAP       --         Physical Exam    Nursing note and vitals reviewed.  Constitutional: She appears well-developed and well-nourished. She is active.   HENT:   Head: Normocephalic and atraumatic.   Eyes: Conjunctivae, EOM and lids are normal. Pupils are equal, round, and reactive to light.   Neck: Trachea normal and phonation normal. Neck supple. No thyroid mass present.   Normal range of motion.  Cardiovascular:  Normal rate, regular rhythm, normal heart sounds and normal pulses.           Pulmonary/Chest: Breath sounds normal.   Abdominal: Abdomen is soft.   Musculoskeletal:         General: Normal range of motion.      Cervical back: Normal range of motion and neck supple.     Neurological:  She is alert and oriented to person, place, and time.   Skin: Skin is warm and intact.   Wound on right knee no active bleeding   Psychiatric: She has a normal mood and affect. Her speech is normal and behavior is normal. Judgment and thought content normal. Cognition and memory are normal.         ED Course   Procedures  Labs Reviewed - No data to display       Imaging Results    None          Medications - No data to display  Medical Decision Making:   Initial Assessment:   73-year-old presents with a bleeding wound on the right knee says she had an I and D the few days ago lowers the continues to bleed chest change dressings about every 4 hours would like it evaluated and also appointment for a wound care clinic      Vital signs are stable on exam she did have a wound in his right knee postop surgical wound where abscess was drained has some irritation but no signs of infection no pus no drainage no active bleeding at this moment patient states she changes her dressing about every 4 hours from it oozing went ahead and applied some SurgiSeal hemostasis was obtained no active bleeding noted patient was put some 4 x 4 gauze and Kerlix instructed to leave the Surgicel until she sees Wound Care on Monday  Differential Diagnosis:   Wound infection versus wound bleeding secondary to Plavix versus motor irritation             ED Course as of 08/11/23 1717   Fri Aug 11, 2023   1715 Dressing was removed from right knee did not see any active bleeding had I and D site that did not look infected SurgiSeal was placed over the wound then wrapped with gauze and Kerlix appointment will be made when I wound care center [BL]      ED Course User Index  [BL] Evert Mathew MD                 Clinical Impression:   Final diagnoses:  [Z48.89] Encounter for post surgical wound check (Primary)        ED Disposition Condition    Discharge Stable          ED Prescriptions    None       Follow-up Information       Follow up With  Specialties Details Why Contact Info    Marla Taveras MD Family Medicine   [address]  [facility] [address]  [place] [address]  [phone]               Evert Mathew MD  08/11/23 3064

## 2023-08-16 ENCOUNTER — HOSPITAL ENCOUNTER (OUTPATIENT)
Dept: WOUND CARE | Facility: HOSPITAL | Age: 73
Discharge: HOME | End: 2023-08-16
Attending: PEDIATRICS
Payer: MEDICARE

## 2023-08-16 VITALS
TEMPERATURE: 99 F | SYSTOLIC BLOOD PRESSURE: 178 MMHG | DIASTOLIC BLOOD PRESSURE: 74 MMHG | OXYGEN SATURATION: 98 % | RESPIRATION RATE: 18 BRPM | HEART RATE: 76 BPM

## 2023-08-16 DIAGNOSIS — L02.415 ABSCESS OF SKIN OF RIGHT KNEE: Primary | ICD-10-CM

## 2023-08-16 PROCEDURE — 99213 PR OFFICE/OUTPT VISIT, EST, LEVL III, 20-29 MIN: ICD-10-PCS | Mod: ,,, | Performed by: PEDIATRICS

## 2023-08-16 PROCEDURE — 99213 OFFICE O/P EST LOW 20 MIN: CPT

## 2023-08-16 PROCEDURE — 87070 CULTURE OTHR SPECIMN AEROBIC: CPT

## 2023-08-16 PROCEDURE — 27000999 HC MEDICAL RECORD PHOTO DOCUMENTATION

## 2023-08-16 PROCEDURE — 99213 OFFICE O/P EST LOW 20 MIN: CPT | Mod: ,,, | Performed by: PEDIATRICS

## 2023-08-16 RX ORDER — CLINDAMYCIN HYDROCHLORIDE 300 MG/1
300 CAPSULE ORAL 4 TIMES DAILY
Qty: 40 CAPSULE | Refills: 0 | Status: SHIPPED | OUTPATIENT
Start: 2023-08-16 | End: 2023-08-26

## 2023-08-16 NOTE — PATIENT INSTRUCTIONS
Cleanse wound with: NS, soap and water, or wound cleanser  Lidocaine: Lidocaine 2% gel PRN in wound care center  Silver nitrate:  Periwound care: Skin prep PRN  Primary dressing: vashe moistened mesalt packing  Secondary dressing: gauze, coban  Offloading:  Edema control: elevate at rest for edema control   Frequency:MWF in wound care center  Follow-up:nurse visit Friday Monday, md visit Wednesday

## 2023-08-16 NOTE — PROGRESS NOTES
Subjective:       Patient ID: Rahel Bagley is a 73 y.o. female.    Chief Complaint: Wound Consult (Pt had abcess on right knee incised by [facility] 7/27. Presented to [facility] ED on 8/11/23 with concerns for bleeding. Pt on plavix. Reports finishing antibiotics previously prescribed.   Referred by ED for evaluation and treatment//)    HPI patient comes in today with abscess of the right knee which was I indeed on 07/27/2023 patient is on Plavix and visit on 08/11/2023.  Antibiotics are finished and patient is still having drainage.    Review of Systems      Objective:      Temp:  [98.7 °F (37.1 °C)]   Pulse:  [76]   Resp:  [18]   BP: (178)/(74)   SpO2:  [98 %]   Physical Exam       Altered Skin Integrity 08/16/23 1532 Right anterior Knee Abscess (Active)   08/16/23 1532   Altered Skin Integrity Present on Admission - Did Patient arrive to the hospital with altered skin?: yes   Side: Right   Orientation: anterior   Location: Knee   Wound Number:    Is this injury device related?:    Primary Wound Type: Abscess   Description of Altered Skin Integrity:    Ankle-Brachial Index:    Pulses:    Removal Indication and Assessment:    Wound Outcome:    (Retired) Wound Length (cm):    (Retired) Wound Width (cm):    (Retired) Depth (cm):    Wound Description (Comments):    Removal Indications:    Wound Image   08/16/23 1542   Dressing Appearance Intact;Moist drainage 08/16/23 1542   Drainage Amount Large 08/16/23 1542   Drainage Characteristics/Odor Serosanguineous;Green 08/16/23 1542   Appearance Pink;Red;Not granulating;Hypergranulation;Smooth 08/16/23 1542   Tissue loss description Full thickness 08/16/23 1542   Red (%), Wound Tissue Color 100 % 08/16/23 1542   Periwound Area Edematous;Redness 08/16/23 1542   Wound Edges Defined 08/16/23 1542   Wound Length (cm) 1.2 cm 08/16/23 1542   Wound Width (cm) 1 cm 08/16/23 1542   Wound Depth (cm) 1.5 cm 08/16/23 1542   Wound Volume (cm^3) 1.8 cm^3 08/16/23 1542   Wound Surface Area  (cm^2) 1.2 cm^2 08/16/23 1542   Undermining (depth (cm)/location) 360 degrees / 0.4cm at 12 o'clock 08/16/23 1542   Care Cleansed with:;Antimicrobial agent;Other (see comments) 08/16/23 1542   Dressing Applied;Gauze;Elastic bandage 08/16/23 1542   Packing other (see comment) 08/16/23 1542         Assessment:     Patient today presents with open wound with moist drainage of her right anterior knee.  This is somewhat hypergranulation.  The surrounding areas somewhat erythematous.  Area is 1.2 cm x 1 cm with a depth of 1.5 cm.  This does not appear to go to the joint capsule.  This area was cultured and cleaned and was packed with Vashe moistened Mesalt.  Patient started on clindamycin.  Patient return Friday for a nurse visit then Monday and in 1 week for physician visit.    ICD-10-CM ICD-9-CM   1. Abscess of skin of right knee  L02.415 682.6         Plan:   Tissue pathology and/or culture taken:  [x] Yes [] No   Sharp debridement performed:   [] Yes [x] No   Labs ordered this visit:   [] Yes [x] No   Imaging ordered this visit:   [] Yes [x] No           Orders Placed This Encounter   Procedures    Wound Culture    Change dressing     Cleanse wound with: NS, soap and water, or wound cleanser  Lidocaine: Lidocaine 2% gel PRN in wound care center  Silver nitrate:  Periwound care: Skin prep PRN  Primary dressing: vashe moistened mesalt packing  Secondary dressing: gauze, coban  Offloading:  Edema control: elevate at rest for edema control   Frequency:MWF in wound care center  Follow-up:nurse visit Friday Monday, md visit Wednesday     Standing Status:   Standing     Number of Occurrences:   6     Standing Expiration Date:   9/16/2023        Follow up in about 2 days (around 8/18/2023) for Friday and Monday nurse visit, md visit Wednesday.

## 2023-08-18 ENCOUNTER — HOSPITAL ENCOUNTER (OUTPATIENT)
Dept: WOUND CARE | Facility: HOSPITAL | Age: 73
Discharge: HOME | End: 2023-08-18
Attending: PEDIATRICS
Payer: MEDICARE

## 2023-08-18 NOTE — PROGRESS NOTES
Subjective:       Patient ID: Rahel Bagley is a 73 y.o. female.    Chief Complaint: No chief complaint on file.    HPI  Review of Systems      Objective:      BP: ()/()   Arterial Line BP: ()/()   Physical Exam         Assessment:       No diagnosis found.      Plan:   Tissue pathology and/or culture taken:  [] Yes [] No   Sharp debridement performed:   [] Yes [] No   Labs ordered this visit:   [] Yes [] No   Imaging ordered this visit:   [] Yes [] No           No orders of the defined types were placed in this encounter.       No follow-ups on file.

## 2023-08-20 LAB — BACTERIA SPEC CULT: NO GROWTH

## 2023-08-21 ENCOUNTER — HOSPITAL ENCOUNTER (OUTPATIENT)
Dept: WOUND CARE | Facility: HOSPITAL | Age: 73
Discharge: HOME | End: 2023-08-21
Attending: PEDIATRICS
Payer: MEDICARE

## 2023-08-21 VITALS
HEART RATE: 71 BPM | SYSTOLIC BLOOD PRESSURE: 128 MMHG | RESPIRATION RATE: 20 BRPM | OXYGEN SATURATION: 96 % | TEMPERATURE: 99 F | DIASTOLIC BLOOD PRESSURE: 60 MMHG

## 2023-08-21 DIAGNOSIS — L02.415 ABSCESS OF SKIN OF RIGHT KNEE: Primary | ICD-10-CM

## 2023-08-21 PROCEDURE — 27000999 HC MEDICAL RECORD PHOTO DOCUMENTATION

## 2023-08-21 PROCEDURE — 99213 OFFICE O/P EST LOW 20 MIN: CPT

## 2023-08-21 NOTE — PROGRESS NOTES
[facility]      Subjective:     Patient seen in clinic today.  Dressing changed as ordered.      Assessment:          Altered Skin Integrity 08/16/23 1532 Right anterior Knee Abscess (Active)   08/16/23 1532   Altered Skin Integrity Present on Admission - Did Patient arrive to the hospital with altered skin?: yes   Side: Right   Orientation: anterior   Location: Knee   Wound Number:    Is this injury device related?:    Primary Wound Type: Abscess   Description of Altered Skin Integrity:    Ankle-Brachial Index:    Pulses:    Removal Indication and Assessment:    Wound Outcome:    (Retired) Wound Length (cm):    (Retired) Wound Width (cm):    (Retired) Depth (cm):    Wound Description (Comments):    Removal Indications:    Wound Image   08/21/23 1052   Dressing Appearance Intact;Moist drainage 08/21/23 1052   Drainage Amount Moderate 08/21/23 1052   Drainage Characteristics/Odor Serosanguineous;No odor;Bleeding controlled 08/21/23 1052   Appearance Red;Not granulating;Smooth 08/21/23 1052   Tissue loss description Full thickness 08/21/23 1052   Red (%), Wound Tissue Color 100 % 08/21/23 1052   Periwound Area Edematous 08/21/23 1052   Wound Edges Defined 08/21/23 1052   Wound Length (cm) 1.2 cm 08/21/23 1052   Wound Width (cm) 1.5 cm 08/21/23 1052   Wound Depth (cm) 1.5 cm 08/21/23 1052   Wound Volume (cm^3) 2.7 cm^3 08/21/23 1052   Wound Surface Area (cm^2) 1.8 cm^2 08/21/23 1052   Undermining (depth (cm)/location) 360 degrees / 0.4cm at 12 o'clock 08/21/23 1052   Care Cleansed with:;Antimicrobial agent 08/21/23 1052   Dressing Applied;Gauze;Elastic bandage 08/21/23 1052   Packing other (see comment) 08/21/23 1052         ICD-10-CM ICD-9-CM   1. Abscess of skin of right knee  L02.415 682.6               Orders Placed This Encounter   Procedures    Change dressing     Cleanse wound with: NS, soap and water, or wound cleanser  Lidocaine: Lidocaine 2% gel PRN in wound care center  Silver  nitrate:  Periwound care: Skin prep PRN  Primary dressing: vashe moistened mesalt packing  Secondary dressing: gauze, coban  Offloading:  Edema control: elevate at rest for edema control   Frequency:MWF in wound care center  Follow-up:nurse visit Friday Monday, md visit Wednesday     Standing Status:   Standing     Number of Occurrences:   1

## 2023-08-23 ENCOUNTER — HOSPITAL ENCOUNTER (OUTPATIENT)
Dept: WOUND CARE | Facility: HOSPITAL | Age: 73
Discharge: HOME | End: 2023-08-23
Attending: PEDIATRICS
Payer: MEDICARE

## 2023-08-23 VITALS
BODY MASS INDEX: 30.73 KG/M2 | SYSTOLIC BLOOD PRESSURE: 148 MMHG | TEMPERATURE: 98 F | OXYGEN SATURATION: 98 % | DIASTOLIC BLOOD PRESSURE: 62 MMHG | HEIGHT: 62 IN | HEART RATE: 68 BPM | WEIGHT: 167 LBS | RESPIRATION RATE: 20 BRPM

## 2023-08-23 DIAGNOSIS — L02.415 ABSCESS OF SKIN OF RIGHT KNEE: ICD-10-CM

## 2023-08-23 DIAGNOSIS — M25.10: Primary | ICD-10-CM

## 2023-08-23 PROCEDURE — 99213 OFFICE O/P EST LOW 20 MIN: CPT | Mod: ,,, | Performed by: PEDIATRICS

## 2023-08-23 PROCEDURE — 99213 OFFICE O/P EST LOW 20 MIN: CPT

## 2023-08-23 PROCEDURE — 99213 PR OFFICE/OUTPT VISIT, EST, LEVL III, 20-29 MIN: ICD-10-PCS | Mod: ,,, | Performed by: PEDIATRICS

## 2023-08-23 PROCEDURE — 99214 OFFICE O/P EST MOD 30 MIN: CPT | Mod: 27

## 2023-08-23 PROCEDURE — 27000999 HC MEDICAL RECORD PHOTO DOCUMENTATION

## 2023-08-23 RX ORDER — FUROSEMIDE 40 MG/1
40 TABLET ORAL EVERY MORNING
COMMUNITY
Start: 2023-08-20

## 2023-08-23 NOTE — PROGRESS NOTES
Subjective:       Patient ID: Rahel Bagley is a 73 y.o. female.    Chief Complaint: Non-healing Wound Follow Up (R knee ulceration.   Follow up with md for re-evaluation.   )    HPI  Review of Systems      Objective:      Temp:  [98.4 °F (36.9 °C)]   Pulse:  [68]   Resp:  [20]   BP: (148)/(62)   SpO2:  [98 %]   Physical Exam       Altered Skin Integrity 08/16/23 1532 Right anterior Knee Abscess (Active)   08/16/23 1532   Altered Skin Integrity Present on Admission - Did Patient arrive to the hospital with altered skin?: yes   Side: Right   Orientation: anterior   Location: Knee   Wound Number:    Is this injury device related?:    Primary Wound Type: Abscess   Description of Altered Skin Integrity:    Ankle-Brachial Index:    Pulses:    Removal Indication and Assessment:    Wound Outcome:    (Retired) Wound Length (cm):    (Retired) Wound Width (cm):    (Retired) Depth (cm):    Wound Description (Comments):    Removal Indications:    Wound Image   08/23/23 1414   Dressing Appearance Intact;Moist drainage 08/23/23 1414   Drainage Amount Large 08/23/23 1414   Drainage Characteristics/Odor Serosanguineous;Creamy;Yellow;No odor 08/23/23 1414   Appearance Red;Not granulating;Smooth;Other (see comments);Hypergranulation 08/23/23 1414   Tissue loss description Full thickness 08/23/23 1414   Red (%), Wound Tissue Color 95 % 08/23/23 1414   Periwound Area Edematous;Redness 08/23/23 1414   Wound Edges Jagged 08/23/23 1414   Wound Length (cm) 1.5 cm 08/23/23 1414   Wound Width (cm) 1.5 cm 08/23/23 1414   Wound Depth (cm) 1.5 cm 08/23/23 1414   Wound Volume (cm^3) 3.375 cm^3 08/23/23 1414   Wound Surface Area (cm^2) 2.25 cm^2 08/23/23 1414   Undermining (depth (cm)/location) 360 degrees / 0.7cm at 3 o'clock 08/23/23 1414   Care Cleansed with:;Antimicrobial agent 08/23/23 1414   Dressing Applied;Elastic bandage 08/23/23 1414   Packing packed with;hydrofiber/alginate 08/23/23 1414         Assessment:     Today wound is 1.5  cm x 1.5 cm with a depth of 1.5 cm.  This is much deeper.  Area is with hypergranulation and friable tissue.  There is also a continuous clear drainage which appears to be synovial fluid.  This could be a fistula into the bursa or joint capsule.  Cultures have been negative.  Patient had x-ray with showed a pre patellar effusion.  We will continue to pack this with Aquacel Ag and patient will return on Friday and Monday for nurse visits and in 1 week for physician visit.  We will also order MRI of the knee and consult patient's orthopedic surgeon.  Patient is allergic to iodine.    ICD-10-CM ICD-9-CM   1. Fistula into joint  M25.10 719.80   2. Abscess of skin of right knee  L02.415 682.6         Plan:   Tissue pathology and/or culture taken:  [] Yes [x] No   Sharp debridement performed:   [] Yes [x] No   Labs ordered this visit:   [] Yes [x] No   Imaging ordered this visit:   [x] Yes [] No           Orders Placed This Encounter   Procedures    MRI Knee Without Contrast Right     Standing Status:   Future     Standing Expiration Date:   8/23/2024     Order Specific Question:   Does the patient have a pacemaker or a defibrillator (Note: Some facilities may not be able to schedule an MRI for patients with pacemakers and defibrillators. You should contact your local radiology department to determine if this is the case.)?     Answer:   Yes     Order Specific Question:   Does the patient have an aneurysm or surgical clip, pump, nerve/brain stimulator, middle/inner ear prosthesis, or other metal implant or foreign object (bullet, shrapnel)? If they have a card related to their implant, ask them to bring it. Issues related to the implant may cause the MRI to be delayed.     Answer:   Yes     Comments:   Replacement knee     Order Specific Question:   Is the patient claustrophobic?     Answer:   No     Order Specific Question:   Will the patient require sedation?     Answer:   No     Order Specific Question:   Does the  patient have any of the following conditions? Diabetes, History of Renal Disease or Hypertension requiring medical therapy?     Answer:   Yes     Comments:   Although the above     Order Specific Question:   May the Radiologist modify the order per protocol to meet the clinical needs of the patient?     Answer:   Yes     Order Specific Question:   Is this part of a Research Study?     Answer:   No     Order Specific Question:   Recist criteria?     Answer:   No     Order Specific Question:   Will this service be billed to a Worker's Comp policy?     Answer:   No     Order Specific Question:   Does the patient have on a skin patch for medication with aluminized backing?     Answer:   No     Order Specific Question:   [facility] ONLY: Performing/Resulting Location     Answer:   [facility]    Change dressing     Cleanse wound with: NS, soap and water, or wound cleanser   Lidocaine: Lidocaine 2% gel PRN in wound care center   Silver nitrate:   Periwound care: Skin prep PRN   Primary dressing:Aquacel ag rope  Secondary dressing: gauze, coban   Offloading:   Edema control: elevate at rest for edema control   Frequency:MWF in wound care center   Follow-up:nurse visit Friday Monday, md visit Wednesday    MRI to be ordered for Right knee.  Will be set up at [facility] outpatient radiology once approved        Follow up in about 2 days (around 8/25/2023) for Friday / Monday for nurse visit, md visit Wednesday.

## 2023-08-23 NOTE — PATIENT INSTRUCTIONS
Cleanse wound with: NS, soap and water, or wound cleanser   Lidocaine: Lidocaine 2% gel PRN in wound care center   Silver nitrate:   Periwound care: Skin prep PRN   Primary dressing:Aquacel ag rope  Secondary dressing: gauze, coban   Offloading:   Edema control: elevate at rest for edema control   Frequency:MWF in wound care center   Follow-up:nurse visit Friday Monday, md visit Wednesday    MRI to be ordered for Right knee.  Will be set up at [facility] outpatient radiology once approved

## 2023-08-24 ENCOUNTER — INFUSION (OUTPATIENT)
Dept: INFUSION THERAPY | Facility: HOSPITAL | Age: 73
End: 2023-08-24
Attending: INTERNAL MEDICINE
Payer: MEDICARE

## 2023-08-24 VITALS
OXYGEN SATURATION: 96 % | HEIGHT: 62 IN | HEART RATE: 77 BPM | BODY MASS INDEX: 30.76 KG/M2 | DIASTOLIC BLOOD PRESSURE: 61 MMHG | RESPIRATION RATE: 20 BRPM | SYSTOLIC BLOOD PRESSURE: 133 MMHG | WEIGHT: 167.13 LBS | TEMPERATURE: 98 F

## 2023-08-24 DIAGNOSIS — D63.1 ANEMIA OF CHRONIC KIDNEY FAILURE, STAGE 4 (SEVERE): ICD-10-CM

## 2023-08-24 DIAGNOSIS — I10 ESSENTIAL HYPERTENSION, MALIGNANT: ICD-10-CM

## 2023-08-24 DIAGNOSIS — I65.23 BILATERAL CAROTID ARTERY OCCLUSION: ICD-10-CM

## 2023-08-24 DIAGNOSIS — D64.9 ANEMIA, UNSPECIFIED TYPE: Primary | ICD-10-CM

## 2023-08-24 DIAGNOSIS — I48.0 PAROXYSMAL ATRIAL FIBRILLATION: ICD-10-CM

## 2023-08-24 DIAGNOSIS — N18.4 CHRONIC KIDNEY DISEASE, STAGE 4 (SEVERE): ICD-10-CM

## 2023-08-24 DIAGNOSIS — N18.4 ANEMIA OF CHRONIC KIDNEY FAILURE, STAGE 4 (SEVERE): ICD-10-CM

## 2023-08-24 DIAGNOSIS — E78.5 HYPERLIPIDEMIA, UNSPECIFIED HYPERLIPIDEMIA TYPE: ICD-10-CM

## 2023-08-24 LAB
ALBUMIN SERPL-MCNC: 2.8 G/DL (ref 3.4–4.8)
ALBUMIN/GLOB SERPL: 0.7 RATIO (ref 1.1–2)
ALP SERPL-CCNC: 63 UNIT/L (ref 40–150)
ALT SERPL-CCNC: 6 UNIT/L (ref 0–55)
AST SERPL-CCNC: 16 UNIT/L (ref 5–34)
BILIRUB SERPL-MCNC: 0.6 MG/DL
BUN SERPL-MCNC: 31.8 MG/DL (ref 9.8–20.1)
CALCIUM SERPL-MCNC: 9.8 MG/DL (ref 8.4–10.2)
CHLORIDE SERPL-SCNC: 104 MMOL/L (ref 98–107)
CHOLEST SERPL-MCNC: 84 MG/DL
CHOLEST/HDLC SERPL: 3 {RATIO} (ref 0–5)
CO2 SERPL-SCNC: 28 MMOL/L (ref 23–31)
CREAT SERPL-MCNC: 1.29 MG/DL (ref 0.55–1.02)
FERRITIN SERPL-MCNC: 416.1 NG/ML (ref 4.63–204)
GFR SERPLBLD CREATININE-BSD FMLA CKD-EPI: 44 MLS/MIN/1.73/M2
GLOBULIN SER-MCNC: 4 GM/DL (ref 2.4–3.5)
GLUCOSE SERPL-MCNC: 183 MG/DL (ref 82–115)
HCT VFR BLD AUTO: 27.7 % (ref 37–47)
HDLC SERPL-MCNC: 32 MG/DL (ref 35–60)
HGB BLD-MCNC: 8.1 G/DL (ref 12–16)
HGB, POC: 8.2 G/DL (ref 12–16)
IRON SATN MFR SERPL: 15 % (ref 20–50)
IRON SERPL-MCNC: 35 UG/DL (ref 50–170)
LDLC SERPL CALC-MCNC: 40 MG/DL (ref 50–140)
POTASSIUM SERPL-SCNC: 3.9 MMOL/L (ref 3.5–5.1)
PROT SERPL-MCNC: 6.8 GM/DL (ref 5.8–7.6)
SODIUM SERPL-SCNC: 141 MMOL/L (ref 136–145)
T4 SERPL-MCNC: 10.41 UG/DL (ref 4.87–11.72)
TIBC SERPL-MCNC: 201 UG/DL (ref 70–310)
TIBC SERPL-MCNC: 236 UG/DL (ref 250–450)
TRANSFERRIN SERPL-MCNC: 180 MG/DL (ref 173–360)
TRIGL SERPL-MCNC: 60 MG/DL (ref 37–140)
TSH SERPL-ACNC: 0.69 UIU/ML (ref 0.35–4.94)
VLDLC SERPL CALC-MCNC: 12 MG/DL

## 2023-08-24 PROCEDURE — 36415 COLL VENOUS BLD VENIPUNCTURE: CPT | Mod: 91

## 2023-08-24 PROCEDURE — 82728 ASSAY OF FERRITIN: CPT | Performed by: INTERNAL MEDICINE

## 2023-08-24 PROCEDURE — 80061 LIPID PANEL: CPT

## 2023-08-24 PROCEDURE — 83550 IRON BINDING TEST: CPT | Performed by: INTERNAL MEDICINE

## 2023-08-24 PROCEDURE — 63600175 PHARM REV CODE 636 W HCPCS: Mod: TB | Performed by: INTERNAL MEDICINE

## 2023-08-24 PROCEDURE — 96372 THER/PROPH/DIAG INJ SC/IM: CPT

## 2023-08-24 PROCEDURE — 84436 ASSAY OF TOTAL THYROXINE: CPT

## 2023-08-24 PROCEDURE — 36415 COLL VENOUS BLD VENIPUNCTURE: CPT

## 2023-08-24 PROCEDURE — 85014 HEMATOCRIT: CPT | Performed by: INTERNAL MEDICINE

## 2023-08-24 PROCEDURE — 84443 ASSAY THYROID STIM HORMONE: CPT

## 2023-08-24 PROCEDURE — 80053 COMPREHEN METABOLIC PANEL: CPT

## 2023-08-24 RX ADMIN — EPOETIN ALFA-EPBX 2500 UNITS: 10000 INJECTION, SOLUTION INTRAVENOUS; SUBCUTANEOUS at 11:08

## 2023-08-24 RX ADMIN — EPOETIN ALFA-EPBX 10000 UNITS: 10000 INJECTION, SOLUTION INTRAVENOUS; SUBCUTANEOUS at 11:08

## 2023-08-25 ENCOUNTER — HOSPITAL ENCOUNTER (OUTPATIENT)
Dept: WOUND CARE | Facility: HOSPITAL | Age: 73
Discharge: HOME | End: 2023-08-25
Attending: PEDIATRICS
Payer: MEDICARE

## 2023-08-25 VITALS
SYSTOLIC BLOOD PRESSURE: 186 MMHG | HEART RATE: 72 BPM | DIASTOLIC BLOOD PRESSURE: 79 MMHG | RESPIRATION RATE: 18 BRPM | OXYGEN SATURATION: 99 % | TEMPERATURE: 98 F

## 2023-08-25 DIAGNOSIS — L02.415 ABSCESS OF SKIN OF RIGHT KNEE: Primary | ICD-10-CM

## 2023-08-25 NOTE — PATIENT INSTRUCTIONS
Cleanse wound with: NS, soap and water, or wound cleanser   Lidocaine: Lidocaine 2% gel PRN in wound care center   Silver nitrate:   Periwound care: Skin prep PRN   Primary dressing:Aquacel ag rope  Secondary dressing: gauze, coban   Offloading:   Edema control: elevate at rest for edema control   Frequency:MWF in wound care center   Follow-up:nurse visit Friday Monday, md visit Wednesday     MRI to be ordered for Right knee.  Will be set up at [facility] outpatient radiology once approved

## 2023-08-30 ENCOUNTER — HOSPITAL ENCOUNTER (OUTPATIENT)
Dept: WOUND CARE | Facility: HOSPITAL | Age: 73
Discharge: HOME | End: 2023-08-30
Attending: PEDIATRICS
Payer: MEDICARE

## 2023-08-30 VITALS
SYSTOLIC BLOOD PRESSURE: 146 MMHG | RESPIRATION RATE: 18 BRPM | HEART RATE: 72 BPM | OXYGEN SATURATION: 96 % | TEMPERATURE: 99 F | DIASTOLIC BLOOD PRESSURE: 64 MMHG

## 2023-08-30 DIAGNOSIS — L02.415 ABSCESS OF SKIN OF RIGHT KNEE: Primary | ICD-10-CM

## 2023-08-30 DIAGNOSIS — M25.10: ICD-10-CM

## 2023-08-30 PROCEDURE — 99213 OFFICE O/P EST LOW 20 MIN: CPT

## 2023-08-30 PROCEDURE — 27000999 HC MEDICAL RECORD PHOTO DOCUMENTATION

## 2023-08-30 PROCEDURE — 99213 OFFICE O/P EST LOW 20 MIN: CPT | Mod: ,,, | Performed by: PEDIATRICS

## 2023-08-30 PROCEDURE — 99213 PR OFFICE/OUTPT VISIT, EST, LEVL III, 20-29 MIN: ICD-10-PCS | Mod: ,,, | Performed by: PEDIATRICS

## 2023-08-30 NOTE — PROGRESS NOTES
Subjective:       Patient ID: Rahel Bagley is a 73 y.o. female.    Chief Complaint: Non-healing Wound Follow Up (R knee ulceration.   Pt reports site continues to drain.  Hasn't had the MRI obtained, would prefer to have at [facility] or [facility] per patient request.  Here for re-evaluation/)    HPI  Review of Systems      Objective:      Temp:  [98.7 °F (37.1 °C)]   Pulse:  [72]   Resp:  [18]   BP: (146)/(64)   SpO2:  [96 %]   Physical Exam       Altered Skin Integrity 08/16/23 1532 Right anterior Knee Abscess (Active)   08/16/23 1532   Altered Skin Integrity Present on Admission - Did Patient arrive to the hospital with altered skin?: yes   Side: Right   Orientation: anterior   Location: Knee   Wound Number:    Is this injury device related?:    Primary Wound Type: Abscess   Description of Altered Skin Integrity:    Ankle-Brachial Index:    Pulses:    Removal Indication and Assessment:    Wound Outcome:    (Retired) Wound Length (cm):    (Retired) Wound Width (cm):    (Retired) Depth (cm):    Wound Description (Comments):    Removal Indications:    Wound Image   08/30/23 1510   Dressing Appearance Intact;Moist drainage 08/30/23 1510   Drainage Amount Large 08/30/23 1510   Drainage Characteristics/Odor Serosanguineous;No odor;Bleeding controlled 08/30/23 1510   Appearance Red;Not granulating;Hypergranulation;Smooth 08/30/23 1510   Tissue loss description Full thickness 08/30/23 1510   Red (%), Wound Tissue Color 100 % 08/30/23 1510   Periwound Area Intact 08/30/23 1510   Wound Edges Irregular 08/30/23 1510   Wound Length (cm) 1.5 cm 08/30/23 1510   Wound Width (cm) 1.8 cm 08/30/23 1510   Wound Surface Area (cm^2) 2.7 cm^2 08/30/23 1510   Care Antimicrobial agent;Cleansed with: 08/30/23 1510   Dressing Applied;Hydrofiber;Silver;Gauze;Elastic bandage 08/30/23 1510         Assessment:     Today the area is hyper granulated.  Tissue is somewhat slick looking.  There is copious serous drainage coming out from the  wound.  This possibly is a fistula.  Patient was not able to get the MRI done here in [facility] and she refused to go to [facility].  Try to schedule at another place.  Spoke with Dr. Redd nurse and she will communicate with him and get back to us.  Wound was covered with Aquacel Ag and gauze and elastic bandage.  This will be changed every 2 days and patient will return in 1 week for MD visit    ICD-10-CM ICD-9-CM   1. Abscess of skin of right knee  L02.415 682.6   2. Fistula into joint  M25.10 719.80         Plan:   Tissue pathology and/or culture taken:  [] Yes [x] No   Sharp debridement performed:   [] Yes [x] No   Labs ordered this visit:   [] Yes [x] No   Imaging ordered this visit:   [] Yes [x] No           Orders Placed This Encounter   Procedures    Change dressing     Cleanse wound with: NS or Wound Cleanser  Primary dressing: Apply Aquacel Ag over open area  Secondary dressing: gauze, elastic bandage    Edema control: Elevate for edema control  Frequency: change every 2 days and prn for drainage  Follow-up: 1 week in wound care center    Other Orders: Obtain MRI of Right knee at your preferred Diagnostic location.        Follow up in about 1 week (around 9/6/2023) for md visit.

## 2023-08-30 NOTE — PATIENT INSTRUCTIONS
Cleanse wound with: NS or Wound Cleanser  Primary dressing: Apply Aquacel Ag over open area  Secondary dressing: gauze, elastic bandage    Edema control: Elevate for edema control  Frequency: change every 2 days and prn for drainage  Follow-up: 1 week in wound care center    Other Orders: Obtain MRI of Right knee at your preferred Diagnostic location.

## 2023-09-08 ENCOUNTER — INFUSION (OUTPATIENT)
Dept: INFUSION THERAPY | Facility: HOSPITAL | Age: 73
End: 2023-09-08
Attending: INTERNAL MEDICINE
Payer: MEDICARE

## 2023-09-08 VITALS
SYSTOLIC BLOOD PRESSURE: 127 MMHG | RESPIRATION RATE: 20 BRPM | DIASTOLIC BLOOD PRESSURE: 60 MMHG | OXYGEN SATURATION: 95 % | TEMPERATURE: 98 F | BODY MASS INDEX: 30.76 KG/M2 | HEART RATE: 69 BPM | WEIGHT: 167.13 LBS | HEIGHT: 62 IN

## 2023-09-08 DIAGNOSIS — D63.1 ANEMIA OF CHRONIC KIDNEY FAILURE, STAGE 4 (SEVERE): Primary | ICD-10-CM

## 2023-09-08 DIAGNOSIS — N18.4 CHRONIC KIDNEY DISEASE, STAGE 4 (SEVERE): ICD-10-CM

## 2023-09-08 DIAGNOSIS — N17.9 ACUTE KIDNEY FAILURE, UNSPECIFIED: ICD-10-CM

## 2023-09-08 DIAGNOSIS — N18.4 ANEMIA OF CHRONIC KIDNEY FAILURE, STAGE 4 (SEVERE): Primary | ICD-10-CM

## 2023-09-08 LAB
ALBUMIN SERPL-MCNC: 2.6 G/DL (ref 3.4–4.8)
ALBUMIN/GLOB SERPL: 0.7 RATIO (ref 1.1–2)
ALP SERPL-CCNC: 57 UNIT/L (ref 40–150)
ALT SERPL-CCNC: 5 UNIT/L (ref 0–55)
AST SERPL-CCNC: 17 UNIT/L (ref 5–34)
BASOPHILS # BLD AUTO: 0.04 X10(3)/MCL
BASOPHILS NFR BLD AUTO: 0.9 %
BILIRUB SERPL-MCNC: 0.6 MG/DL
BUN SERPL-MCNC: 30.7 MG/DL (ref 9.8–20.1)
CALCIUM SERPL-MCNC: 9 MG/DL (ref 8.4–10.2)
CHLORIDE SERPL-SCNC: 102 MMOL/L (ref 98–107)
CO2 SERPL-SCNC: 27 MMOL/L (ref 23–31)
CREAT SERPL-MCNC: 1.63 MG/DL (ref 0.55–1.02)
EOSINOPHIL # BLD AUTO: 0.04 X10(3)/MCL (ref 0–0.9)
EOSINOPHIL NFR BLD AUTO: 0.9 %
ERYTHROCYTE [DISTWIDTH] IN BLOOD BY AUTOMATED COUNT: 15.7 % (ref 11.5–17)
GFR SERPLBLD CREATININE-BSD FMLA CKD-EPI: 33 MLS/MIN/1.73/M2
GLOBULIN SER-MCNC: 3.9 GM/DL (ref 2.4–3.5)
GLUCOSE SERPL-MCNC: 275 MG/DL (ref 82–115)
HCT VFR BLD AUTO: 29.9 % (ref 37–47)
HGB BLD-MCNC: 9 G/DL (ref 12–16)
HGB, POC: 8.7 G/DL (ref 12–16)
IMM GRANULOCYTES # BLD AUTO: 0.01 X10(3)/MCL (ref 0–0.04)
IMM GRANULOCYTES NFR BLD AUTO: 0.2 %
LYMPHOCYTES # BLD AUTO: 0.8 X10(3)/MCL (ref 0.6–4.6)
LYMPHOCYTES NFR BLD AUTO: 17.9 %
MCH RBC QN AUTO: 28 PG (ref 27–31)
MCHC RBC AUTO-ENTMCNC: 30.1 G/DL (ref 33–36)
MCV RBC AUTO: 93.1 FL (ref 80–94)
MONOCYTES # BLD AUTO: 0.53 X10(3)/MCL (ref 0.1–1.3)
MONOCYTES NFR BLD AUTO: 11.9 %
NEUTROPHILS # BLD AUTO: 3.05 X10(3)/MCL (ref 2.1–9.2)
NEUTROPHILS NFR BLD AUTO: 68.2 %
PLATELET # BLD AUTO: 305 X10(3)/MCL (ref 130–400)
PMV BLD AUTO: 10.4 FL (ref 7.4–10.4)
POTASSIUM SERPL-SCNC: 4.1 MMOL/L (ref 3.5–5.1)
PROT SERPL-MCNC: 6.5 GM/DL (ref 5.8–7.6)
RBC # BLD AUTO: 3.21 X10(6)/MCL (ref 4.2–5.4)
SODIUM SERPL-SCNC: 137 MMOL/L (ref 136–145)
WBC # SPEC AUTO: 4.47 X10(3)/MCL (ref 4.5–11.5)

## 2023-09-08 PROCEDURE — 85025 COMPLETE CBC W/AUTO DIFF WBC: CPT | Performed by: INTERNAL MEDICINE

## 2023-09-08 PROCEDURE — 63600175 PHARM REV CODE 636 W HCPCS: Mod: EC,TB | Performed by: INTERNAL MEDICINE

## 2023-09-08 PROCEDURE — 36415 COLL VENOUS BLD VENIPUNCTURE: CPT | Mod: 91

## 2023-09-08 PROCEDURE — 36415 COLL VENOUS BLD VENIPUNCTURE: CPT

## 2023-09-08 PROCEDURE — 80053 COMPREHEN METABOLIC PANEL: CPT

## 2023-09-08 PROCEDURE — 96372 THER/PROPH/DIAG INJ SC/IM: CPT

## 2023-09-08 RX ADMIN — EPOETIN ALFA-EPBX 12500 UNITS: 10000 INJECTION, SOLUTION INTRAVENOUS; SUBCUTANEOUS at 10:09

## 2023-09-15 ENCOUNTER — LAB VISIT (OUTPATIENT)
Dept: LAB | Facility: HOSPITAL | Age: 73
End: 2023-09-15
Attending: INTERNAL MEDICINE
Payer: MEDICARE

## 2023-09-15 DIAGNOSIS — K92.2 ACUTE GASTROINTESTINAL HEMORRHAGE: Primary | ICD-10-CM

## 2023-09-15 LAB
ERYTHROCYTE [DISTWIDTH] IN BLOOD BY AUTOMATED COUNT: 14.8 % (ref 11.5–17)
HCT VFR BLD AUTO: 31.7 % (ref 37–47)
HGB BLD-MCNC: 9.4 G/DL (ref 12–16)
MCH RBC QN AUTO: 27.2 PG (ref 27–31)
MCHC RBC AUTO-ENTMCNC: 29.7 G/DL (ref 33–36)
MCV RBC AUTO: 91.6 FL (ref 80–94)
PLATELET # BLD AUTO: 272 X10(3)/MCL (ref 130–400)
PMV BLD AUTO: 9.5 FL (ref 7.4–10.4)
RBC # BLD AUTO: 3.46 X10(6)/MCL (ref 4.2–5.4)
WBC # SPEC AUTO: 3.92 X10(3)/MCL (ref 4.5–11.5)

## 2023-09-15 PROCEDURE — 85027 COMPLETE CBC AUTOMATED: CPT

## 2023-09-15 PROCEDURE — 36415 COLL VENOUS BLD VENIPUNCTURE: CPT

## 2023-09-21 ENCOUNTER — INFUSION (OUTPATIENT)
Dept: INFUSION THERAPY | Facility: HOSPITAL | Age: 73
End: 2023-09-21
Attending: INTERNAL MEDICINE
Payer: MEDICARE

## 2023-09-21 VITALS
OXYGEN SATURATION: 97 % | HEART RATE: 67 BPM | TEMPERATURE: 98 F | DIASTOLIC BLOOD PRESSURE: 58 MMHG | SYSTOLIC BLOOD PRESSURE: 138 MMHG | BODY MASS INDEX: 30.76 KG/M2 | WEIGHT: 167.13 LBS | HEIGHT: 62 IN | RESPIRATION RATE: 20 BRPM

## 2023-09-21 DIAGNOSIS — D63.1 ANEMIA IN STAGE 3B CHRONIC KIDNEY DISEASE: ICD-10-CM

## 2023-09-21 DIAGNOSIS — N18.4 CHRONIC KIDNEY DISEASE, STAGE 4 (SEVERE): ICD-10-CM

## 2023-09-21 DIAGNOSIS — D63.1 ANEMIA OF CHRONIC KIDNEY FAILURE, STAGE 4 (SEVERE): ICD-10-CM

## 2023-09-21 DIAGNOSIS — N18.32 ANEMIA IN STAGE 3B CHRONIC KIDNEY DISEASE: Primary | ICD-10-CM

## 2023-09-21 DIAGNOSIS — N18.32 ANEMIA IN STAGE 3B CHRONIC KIDNEY DISEASE: ICD-10-CM

## 2023-09-21 DIAGNOSIS — D50.9 IRON DEFICIENCY ANEMIA, UNSPECIFIED IRON DEFICIENCY ANEMIA TYPE: ICD-10-CM

## 2023-09-21 DIAGNOSIS — N18.4 ANEMIA OF CHRONIC KIDNEY FAILURE, STAGE 4 (SEVERE): ICD-10-CM

## 2023-09-21 DIAGNOSIS — D63.1 ANEMIA IN STAGE 3B CHRONIC KIDNEY DISEASE: Primary | ICD-10-CM

## 2023-09-21 PROCEDURE — 25000003 PHARM REV CODE 250: Performed by: INTERNAL MEDICINE

## 2023-09-21 PROCEDURE — 96375 TX/PRO/DX INJ NEW DRUG ADDON: CPT

## 2023-09-21 PROCEDURE — 63600175 PHARM REV CODE 636 W HCPCS: Performed by: INTERNAL MEDICINE

## 2023-09-21 PROCEDURE — 96365 THER/PROPH/DIAG IV INF INIT: CPT

## 2023-09-21 PROCEDURE — A4216 STERILE WATER/SALINE, 10 ML: HCPCS | Performed by: INTERNAL MEDICINE

## 2023-09-21 RX ORDER — SODIUM CHLORIDE 0.9 % (FLUSH) 0.9 %
10 SYRINGE (ML) INJECTION
Status: CANCELLED | OUTPATIENT
Start: 2023-09-28

## 2023-09-21 RX ORDER — ACETAMINOPHEN 325 MG/1
650 TABLET ORAL
Status: COMPLETED | OUTPATIENT
Start: 2023-09-21 | End: 2023-09-21

## 2023-09-21 RX ORDER — SODIUM CHLORIDE 9 MG/ML
INJECTION, SOLUTION INTRAVENOUS CONTINUOUS
Status: CANCELLED | OUTPATIENT
Start: 2023-09-21

## 2023-09-21 RX ORDER — SODIUM CHLORIDE 0.9 % (FLUSH) 0.9 %
10 SYRINGE (ML) INJECTION
Status: CANCELLED | OUTPATIENT
Start: 2023-09-21

## 2023-09-21 RX ORDER — DIPHENHYDRAMINE HYDROCHLORIDE 50 MG/ML
25 INJECTION INTRAMUSCULAR; INTRAVENOUS
Status: CANCELLED
Start: 2023-09-21

## 2023-09-21 RX ORDER — EPINEPHRINE 0.3 MG/.3ML
0.3 INJECTION SUBCUTANEOUS ONCE AS NEEDED
Status: CANCELLED | OUTPATIENT
Start: 2023-09-28

## 2023-09-21 RX ORDER — SODIUM CHLORIDE 0.9 % (FLUSH) 0.9 %
10 SYRINGE (ML) INJECTION
Status: DISCONTINUED | OUTPATIENT
Start: 2023-09-21 | End: 2023-09-21 | Stop reason: HOSPADM

## 2023-09-21 RX ORDER — HEPARIN 100 UNIT/ML
5 SYRINGE INTRAVENOUS
Status: CANCELLED | OUTPATIENT
Start: 2023-09-28

## 2023-09-21 RX ORDER — SODIUM CHLORIDE 9 MG/ML
INJECTION, SOLUTION INTRAVENOUS CONTINUOUS
Status: CANCELLED | OUTPATIENT
Start: 2023-09-28

## 2023-09-21 RX ORDER — DIPHENHYDRAMINE HYDROCHLORIDE 50 MG/ML
50 INJECTION INTRAMUSCULAR; INTRAVENOUS ONCE AS NEEDED
Status: CANCELLED | OUTPATIENT
Start: 2023-09-28

## 2023-09-21 RX ORDER — DIPHENHYDRAMINE HYDROCHLORIDE 50 MG/ML
25 INJECTION INTRAMUSCULAR; INTRAVENOUS
Status: COMPLETED | OUTPATIENT
Start: 2023-09-21 | End: 2023-09-21

## 2023-09-21 RX ORDER — SODIUM CHLORIDE 9 MG/ML
INJECTION, SOLUTION INTRAVENOUS CONTINUOUS
Status: DISCONTINUED | OUTPATIENT
Start: 2023-09-21 | End: 2023-09-21 | Stop reason: HOSPADM

## 2023-09-21 RX ORDER — DIPHENHYDRAMINE HYDROCHLORIDE 50 MG/ML
25 INJECTION INTRAMUSCULAR; INTRAVENOUS
Status: CANCELLED
Start: 2023-09-28

## 2023-09-21 RX ORDER — HEPARIN 100 UNIT/ML
5 SYRINGE INTRAVENOUS
Status: DISCONTINUED | OUTPATIENT
Start: 2023-09-21 | End: 2023-09-21 | Stop reason: HOSPADM

## 2023-09-21 RX ORDER — ACETAMINOPHEN 325 MG/1
650 TABLET ORAL
Status: CANCELLED
Start: 2023-09-28

## 2023-09-21 RX ORDER — EPINEPHRINE 0.3 MG/.3ML
0.3 INJECTION SUBCUTANEOUS ONCE AS NEEDED
Status: CANCELLED | OUTPATIENT
Start: 2023-09-21

## 2023-09-21 RX ORDER — HEPARIN 100 UNIT/ML
5 SYRINGE INTRAVENOUS
Status: CANCELLED | OUTPATIENT
Start: 2023-09-21

## 2023-09-21 RX ORDER — ACETAMINOPHEN 325 MG/1
650 TABLET ORAL
Status: CANCELLED
Start: 2023-09-21

## 2023-09-21 RX ORDER — DIPHENHYDRAMINE HYDROCHLORIDE 50 MG/ML
50 INJECTION INTRAMUSCULAR; INTRAVENOUS ONCE AS NEEDED
Status: CANCELLED | OUTPATIENT
Start: 2023-09-21

## 2023-09-21 RX ADMIN — ACETAMINOPHEN 650 MG: 325 TABLET ORAL at 11:09

## 2023-09-21 RX ADMIN — FERRIC CARBOXYMALTOSE INJECTION 750 MG: 50 INJECTION, SOLUTION INTRAVENOUS at 11:09

## 2023-09-21 RX ADMIN — Medication 20 ML: at 12:09

## 2023-09-21 RX ADMIN — DIPHENHYDRAMINE HYDROCHLORIDE 25 MG: 50 INJECTION INTRAMUSCULAR; INTRAVENOUS at 11:09

## 2023-09-28 ENCOUNTER — INFUSION (OUTPATIENT)
Dept: INFUSION THERAPY | Facility: HOSPITAL | Age: 73
End: 2023-09-28
Attending: INTERNAL MEDICINE
Payer: MEDICARE

## 2023-09-28 ENCOUNTER — HOSPITAL ENCOUNTER (OUTPATIENT)
Dept: WOUND CARE | Facility: HOSPITAL | Age: 73
Discharge: HOME | End: 2023-09-28
Attending: PEDIATRICS
Payer: MEDICARE

## 2023-09-28 VITALS
HEART RATE: 77 BPM | OXYGEN SATURATION: 98 % | TEMPERATURE: 98 F | DIASTOLIC BLOOD PRESSURE: 68 MMHG | WEIGHT: 167.13 LBS | HEIGHT: 62 IN | BODY MASS INDEX: 30.76 KG/M2 | RESPIRATION RATE: 20 BRPM | SYSTOLIC BLOOD PRESSURE: 135 MMHG

## 2023-09-28 DIAGNOSIS — N18.4 CHRONIC KIDNEY DISEASE, STAGE 4 (SEVERE): ICD-10-CM

## 2023-09-28 DIAGNOSIS — D50.9 IRON DEFICIENCY ANEMIA, UNSPECIFIED IRON DEFICIENCY ANEMIA TYPE: ICD-10-CM

## 2023-09-28 DIAGNOSIS — D63.1 ANEMIA OF CHRONIC KIDNEY FAILURE, STAGE 4 (SEVERE): ICD-10-CM

## 2023-09-28 DIAGNOSIS — N18.32 ANEMIA IN STAGE 3B CHRONIC KIDNEY DISEASE: Primary | ICD-10-CM

## 2023-09-28 DIAGNOSIS — B99.9 GRANULOMA DUE TO INFECTION: Primary | ICD-10-CM

## 2023-09-28 DIAGNOSIS — N18.4 ANEMIA OF CHRONIC KIDNEY FAILURE, STAGE 4 (SEVERE): ICD-10-CM

## 2023-09-28 DIAGNOSIS — D63.1 ANEMIA IN STAGE 3B CHRONIC KIDNEY DISEASE: Primary | ICD-10-CM

## 2023-09-28 PROCEDURE — 63600175 PHARM REV CODE 636 W HCPCS: Mod: JZ,TB | Performed by: INTERNAL MEDICINE

## 2023-09-28 PROCEDURE — 17250 CHEM CAUT OF GRANLTJ TISSUE: CPT

## 2023-09-28 PROCEDURE — 17250 CHEM CAUT OF GRANLTJ TISSUE: CPT | Mod: ,,, | Performed by: SURGERY

## 2023-09-28 PROCEDURE — 96375 TX/PRO/DX INJ NEW DRUG ADDON: CPT

## 2023-09-28 PROCEDURE — 99499 UNLISTED E&M SERVICE: CPT | Mod: ,,, | Performed by: SURGERY

## 2023-09-28 PROCEDURE — A4216 STERILE WATER/SALINE, 10 ML: HCPCS | Performed by: INTERNAL MEDICINE

## 2023-09-28 PROCEDURE — 17250 PR CHEM CAUTERY GRANULATN TISSUE: ICD-10-PCS | Mod: ,,, | Performed by: SURGERY

## 2023-09-28 PROCEDURE — 27000999 HC MEDICAL RECORD PHOTO DOCUMENTATION

## 2023-09-28 PROCEDURE — 96365 THER/PROPH/DIAG IV INF INIT: CPT

## 2023-09-28 PROCEDURE — 25000003 PHARM REV CODE 250: Performed by: INTERNAL MEDICINE

## 2023-09-28 PROCEDURE — 99213 OFFICE O/P EST LOW 20 MIN: CPT | Mod: 25

## 2023-09-28 PROCEDURE — 99499 NO LOS: ICD-10-PCS | Mod: ,,, | Performed by: SURGERY

## 2023-09-28 RX ORDER — SODIUM CHLORIDE 9 MG/ML
INJECTION, SOLUTION INTRAVENOUS CONTINUOUS
Status: CANCELLED | OUTPATIENT
Start: 2023-09-28

## 2023-09-28 RX ORDER — DIPHENHYDRAMINE HYDROCHLORIDE 50 MG/ML
25 INJECTION INTRAMUSCULAR; INTRAVENOUS
Status: CANCELLED
Start: 2023-09-28

## 2023-09-28 RX ORDER — DIPHENHYDRAMINE HYDROCHLORIDE 50 MG/ML
50 INJECTION INTRAMUSCULAR; INTRAVENOUS ONCE AS NEEDED
Status: DISCONTINUED | OUTPATIENT
Start: 2023-09-28 | End: 2023-09-28 | Stop reason: HOSPADM

## 2023-09-28 RX ORDER — DIPHENHYDRAMINE HYDROCHLORIDE 50 MG/ML
25 INJECTION INTRAMUSCULAR; INTRAVENOUS
Status: COMPLETED | OUTPATIENT
Start: 2023-09-28 | End: 2023-09-28

## 2023-09-28 RX ORDER — SODIUM CHLORIDE 9 MG/ML
INJECTION, SOLUTION INTRAVENOUS CONTINUOUS
Status: DISCONTINUED | OUTPATIENT
Start: 2023-09-28 | End: 2023-09-28 | Stop reason: HOSPADM

## 2023-09-28 RX ORDER — DIPHENHYDRAMINE HYDROCHLORIDE 50 MG/ML
50 INJECTION INTRAMUSCULAR; INTRAVENOUS ONCE AS NEEDED
OUTPATIENT
Start: 2023-09-28

## 2023-09-28 RX ORDER — EPINEPHRINE 0.3 MG/.3ML
0.3 INJECTION SUBCUTANEOUS ONCE AS NEEDED
OUTPATIENT
Start: 2023-09-28

## 2023-09-28 RX ORDER — ACETAMINOPHEN 325 MG/1
650 TABLET ORAL
Status: CANCELLED
Start: 2023-09-28

## 2023-09-28 RX ORDER — SILVER NITRATE 38.21; 12.74 MG/1; MG/1
STICK TOPICAL
Status: DISCONTINUED
Start: 2023-09-28 | End: 2023-09-29 | Stop reason: HOSPADM

## 2023-09-28 RX ORDER — SODIUM CHLORIDE 0.9 % (FLUSH) 0.9 %
10 SYRINGE (ML) INJECTION
Status: CANCELLED | OUTPATIENT
Start: 2023-09-28

## 2023-09-28 RX ORDER — HEPARIN 100 UNIT/ML
5 SYRINGE INTRAVENOUS
Status: CANCELLED | OUTPATIENT
Start: 2023-09-28

## 2023-09-28 RX ORDER — HEPARIN 100 UNIT/ML
5 SYRINGE INTRAVENOUS
Status: DISCONTINUED | OUTPATIENT
Start: 2023-09-28 | End: 2023-09-28 | Stop reason: HOSPADM

## 2023-09-28 RX ORDER — SODIUM CHLORIDE 0.9 % (FLUSH) 0.9 %
10 SYRINGE (ML) INJECTION
Status: DISCONTINUED | OUTPATIENT
Start: 2023-09-28 | End: 2023-09-28 | Stop reason: HOSPADM

## 2023-09-28 RX ORDER — EPINEPHRINE 0.3 MG/.3ML
0.3 INJECTION SUBCUTANEOUS ONCE AS NEEDED
Status: DISCONTINUED | OUTPATIENT
Start: 2023-09-28 | End: 2023-09-28 | Stop reason: HOSPADM

## 2023-09-28 RX ORDER — ACETAMINOPHEN 325 MG/1
650 TABLET ORAL
Status: COMPLETED | OUTPATIENT
Start: 2023-09-28 | End: 2023-09-28

## 2023-09-28 RX ADMIN — Medication 20 ML: at 12:09

## 2023-09-28 RX ADMIN — FERRIC CARBOXYMALTOSE INJECTION 750 MG: 50 INJECTION, SOLUTION INTRAVENOUS at 11:09

## 2023-09-28 RX ADMIN — Medication 10 ML: at 11:09

## 2023-09-28 RX ADMIN — ACETAMINOPHEN 650 MG: 325 TABLET ORAL at 11:09

## 2023-09-28 RX ADMIN — DIPHENHYDRAMINE HYDROCHLORIDE 25 MG: 50 INJECTION INTRAMUSCULAR; INTRAVENOUS at 11:09

## 2023-09-28 NOTE — PROGRESS NOTES
Subjective:       Patient ID: Rahel Bagley is a 73 y.o. female.    Chief Complaint: Non-healing Wound Follow Up (R knee ulceration.   Follow up with md for re-evaluation and treatment )    HPI  Review of Systems      Objective:      Temp:  [98.1 °F (36.7 °C)]   Pulse:  [77]   Resp:  [20]   BP: (135)/(68)   SpO2:  [98 %]   Physical Exam       Altered Skin Integrity 08/16/23 1532 Right anterior Knee Abscess (Active)   08/16/23 1532   Altered Skin Integrity Present on Admission - Did Patient arrive to the hospital with altered skin?: yes   Side: Right   Orientation: anterior   Location: Knee   Wound Number:    Is this injury device related?:    Primary Wound Type: Abscess   Description of Altered Skin Integrity:    Ankle-Brachial Index:    Pulses:    Removal Indication and Assessment:    Wound Outcome:    (Retired) Wound Length (cm):    (Retired) Wound Width (cm):    (Retired) Depth (cm):    Wound Description (Comments):    Removal Indications:    Wound Image   09/28/23 1345   Dressing Appearance Intact;Moist drainage 09/28/23 1345   Drainage Amount Large 09/28/23 1345   Drainage Characteristics/Odor Sanguineous;Serosanguineous 09/28/23 1345   Appearance Red;Smooth;Hypergranulation 09/28/23 1345   Tissue loss description Full thickness 09/28/23 1345   Red (%), Wound Tissue Color 100 % 09/28/23 1345   Periwound Area Denuded;Redness 09/28/23 1345   Wound Edges Rolled/closed 09/28/23 1345   Wound Length (cm) 1.4 cm 09/28/23 1345   Wound Width (cm) 1.8 cm 09/28/23 1345   Wound Surface Area (cm^2) 2.52 cm^2 09/28/23 1345   Care Cleansed with:;Sterile normal saline;Applied:;Other (see comments) 09/28/23 1345   Dressing Applied;Absorptive Pad;Elastic bandage 09/28/23 1345         Assessment:         ICD-10-CM ICD-9-CM   1. Granuloma due to infection  B99.9 136.9       Patient presents for follow-up today with a granuloma of the right knee.  I have reviewed the patient's records and photographs and workups appears at 1  time she had an abscess in this area and what appears to be a prepatellar bursa which is now coalesced into a granuloma there is no sign of secondary infection in fact previous cultures that were done I reviewed were negative.  Therefore I am recommending that we use chemical cauterization to try a get this granuloma to involute after being consented for silver nitrate sticks were used to cauterize this granuloma Hydrofera blue dressing with Ace wrap was applied patient will follow up next week for nurse visit and re-evaluation  Plan:   Tissue pathology and/or culture taken:  [] Yes [x] No   Sharp debridement performed:   [] Yes [x] No   Labs ordered this visit:   [] Yes [x] No   Imaging ordered this visit:   [] Yes [x] No           Orders Placed This Encounter   Procedures    Change dressing     Cleanse wound with: NS, soap and water, or wound cleanser  Lidocaine: Lidocaine 2% gel PRN in wound care center  Silver nitrate hypergranulation tissue  Periwound care: Skin prep PRN  Primary dressing: hydrofera blue  Secondary dressing: abd , ace wrap  Offloading:  Edema control: elevate for edema control  Frequency: nurse visit Monday, md visit Wednesday     Standing Status:   Standing     Number of Occurrences:   4     Standing Expiration Date:   10/28/2023        Follow up in about 4 days (around 10/2/2023) for nurse visit Monday, md visit wednesday.

## 2023-09-28 NOTE — PATIENT INSTRUCTIONS
Silver nitrate applied to hypergranulation tissue today in wound care center  Hydrofera blue, abd pad, ace wrap  Leave in place and return for nurse visit on Monday for dressing change   Will re-cauterize if needed at next visit.      Nurse visit Monday, MD visit in 1 week.

## 2023-10-02 ENCOUNTER — HOSPITAL ENCOUNTER (OUTPATIENT)
Dept: WOUND CARE | Facility: HOSPITAL | Age: 73
Discharge: HOME | End: 2023-10-02
Attending: PEDIATRICS
Payer: MEDICARE

## 2023-10-02 VITALS
HEART RATE: 67 BPM | DIASTOLIC BLOOD PRESSURE: 64 MMHG | TEMPERATURE: 99 F | OXYGEN SATURATION: 99 % | SYSTOLIC BLOOD PRESSURE: 152 MMHG | RESPIRATION RATE: 18 BRPM

## 2023-10-02 DIAGNOSIS — B99.9 GRANULOMA DUE TO INFECTION: ICD-10-CM

## 2023-10-02 PROCEDURE — 27000999 HC MEDICAL RECORD PHOTO DOCUMENTATION

## 2023-10-02 PROCEDURE — 99213 OFFICE O/P EST LOW 20 MIN: CPT

## 2023-10-02 RX ORDER — SILVER NITRATE 38.21; 12.74 MG/1; MG/1
STICK TOPICAL
Status: DISPENSED
Start: 2023-10-02 | End: 2023-10-02

## 2023-10-02 NOTE — PATIENT INSTRUCTIONS
Silver nitrate applied to hypergranulation tissue today in wound care center  Hydrofera blue, abd pad, ace wrap  Leave in place and return for MD visit on Wednesday   Will re-cauterize if needed at next visit.    May change secondary dressing as needed for excessive drainage

## 2023-10-02 NOTE — PROGRESS NOTES
[facility]      Subjective:     Patient seen in clinic today.  Dressing changed as ordered.      Assessment:          Altered Skin Integrity 08/16/23 1532 Right anterior Knee Abscess (Active)   08/16/23 1532   Altered Skin Integrity Present on Admission - Did Patient arrive to the hospital with altered skin?: yes   Side: Right   Orientation: anterior   Location: Knee   Wound Number:    Is this injury device related?:    Primary Wound Type: Abscess   Description of Altered Skin Integrity:    Ankle-Brachial Index:    Pulses:    Removal Indication and Assessment:    Wound Outcome:    (Retired) Wound Length (cm):    (Retired) Wound Width (cm):    (Retired) Depth (cm):    Wound Description (Comments):    Removal Indications:    Dressing Appearance Intact;Moist drainage 10/02/23 1048   Drainage Amount Large 10/02/23 1048   Drainage Characteristics/Odor Serosanguineous;No odor;Bleeding controlled 10/02/23 1048   Appearance Red;Pink;Hypergranulation;Smooth 10/02/23 1048   Tissue loss description Full thickness 10/02/23 1048   Red (%), Wound Tissue Color 100 % 10/02/23 1048   Periwound Area Denuded;Redness 10/02/23 1048   Wound Edges Rolled/closed 10/02/23 1048   Wound Length (cm) 1 cm 10/02/23 1048   Wound Width (cm) 1.3 cm 10/02/23 1048   Wound Surface Area (cm^2) 1.3 cm^2 10/02/23 1048   Care Cleansed with:;Antimicrobial agent;Applied:;Other (see comments) 10/02/23 1048   Dressing Applied;Methylene blue/gentian violet;Absorptive Pad;Elastic bandage 10/02/23 1048         ICD-10-CM ICD-9-CM   1. Granuloma due to infection  B99.9 136.9           Orders Placed This Encounter   Procedures    Change dressing     Cleanse wound with: NS, soap and water, or wound cleanser  Lidocaine: Lidocaine 2% gel PRN in wound care center  Silver nitrate hypergranulation tissue  Periwound care: Skin prep PRN  Primary dressing: hydrofera blue  Secondary dressing: abd , ace wrap  Offloading:  Edema control: elevate for edema  control  Frequency: nurse visit Monday, md visit Wednesday     Standing Status:   Standing     Number of Occurrences:   1

## 2023-10-04 ENCOUNTER — HOSPITAL ENCOUNTER (OUTPATIENT)
Dept: WOUND CARE | Facility: HOSPITAL | Age: 73
Discharge: HOME | End: 2023-10-04
Attending: PEDIATRICS
Payer: MEDICARE

## 2023-10-04 VITALS
HEART RATE: 61 BPM | OXYGEN SATURATION: 97 % | SYSTOLIC BLOOD PRESSURE: 157 MMHG | DIASTOLIC BLOOD PRESSURE: 63 MMHG | RESPIRATION RATE: 18 BRPM | TEMPERATURE: 98 F

## 2023-10-04 DIAGNOSIS — B99.9 GRANULOMA DUE TO INFECTION: Primary | ICD-10-CM

## 2023-10-04 PROCEDURE — 99213 PR OFFICE/OUTPT VISIT, EST, LEVL III, 20-29 MIN: ICD-10-PCS | Mod: 25,,, | Performed by: PEDIATRICS

## 2023-10-04 PROCEDURE — 17250 CHEM CAUT OF GRANLTJ TISSUE: CPT

## 2023-10-04 PROCEDURE — 27000999 HC MEDICAL RECORD PHOTO DOCUMENTATION

## 2023-10-04 PROCEDURE — 99213 OFFICE O/P EST LOW 20 MIN: CPT

## 2023-10-04 PROCEDURE — 99213 OFFICE O/P EST LOW 20 MIN: CPT | Mod: 25,,, | Performed by: PEDIATRICS

## 2023-10-04 PROCEDURE — 17250 CHEM CAUT OF GRANLTJ TISSUE: CPT | Mod: ,,, | Performed by: PEDIATRICS

## 2023-10-04 PROCEDURE — 17250 PR CHEM CAUTERY GRANULATN TISSUE: ICD-10-PCS | Mod: ,,, | Performed by: PEDIATRICS

## 2023-10-04 RX ORDER — SILVER NITRATE 38.21; 12.74 MG/1; MG/1
STICK TOPICAL
Status: DISCONTINUED
Start: 2023-10-04 | End: 2023-10-05 | Stop reason: HOSPADM

## 2023-10-04 NOTE — PROGRESS NOTES
Subjective:       Patient ID: Rahel Bagley is a 73 y.o. female.    Chief Complaint: Non-healing Wound Follow Up (R knee ulceration.   Follow up with md for re-evaluation and treatment with md)    HPI  Review of Systems      Objective:      Temp:  [98.1 °F (36.7 °C)]   Pulse:  [61]   Resp:  [18]   BP: (157)/(63)   SpO2:  [97 %]   Physical Exam       Altered Skin Integrity 08/16/23 1532 Right anterior Knee Abscess (Active)   08/16/23 1532   Altered Skin Integrity Present on Admission - Did Patient arrive to the hospital with altered skin?: yes   Side: Right   Orientation: anterior   Location: Knee   Wound Number:    Is this injury device related?:    Primary Wound Type: Abscess   Description of Altered Skin Integrity:    Ankle-Brachial Index:    Pulses:    Removal Indication and Assessment:    Wound Outcome:    (Retired) Wound Length (cm):    (Retired) Wound Width (cm):    (Retired) Depth (cm):    Wound Description (Comments):    Removal Indications:    Wound Image    10/04/23 1303   Dressing Appearance Intact;Moist drainage 10/04/23 1303   Drainage Amount Large 10/04/23 1303   Drainage Characteristics/Odor Serosanguineous;Yellow;No odor;Bleeding controlled 10/04/23 1303   Appearance Pink;Red;Hypergranulation;Smooth 10/04/23 1303   Tissue loss description Full thickness 10/04/23 1303   Red (%), Wound Tissue Color 100 % 10/04/23 1303   Periwound Area Scar tissue;Moist 10/04/23 1303   Wound Edges Irregular;Jagged 10/04/23 1303   Wound Length (cm) 0.9 cm 10/04/23 1303   Wound Width (cm) 0.8 cm 10/04/23 1303   Wound Depth (cm) 0.1 cm 10/04/23 1303   Wound Volume (cm^3) 0.072 cm^3 10/04/23 1303   Wound Surface Area (cm^2) 0.72 cm^2 10/04/23 1303   Care Cleansed with:;Antimicrobial agent;Applied:;Other (see comments) 10/04/23 1303   Dressing Applied;Methylene blue/gentian violet;Gauze;Elastic bandage 10/04/23 1303         Assessment:     Today is 0.9 cm x 0.8 cm with a depth of 0.1 cm.  This to have some  serosanguineous drainage.  The tissue is red and hyper granulated.  Areas cleaned and silver nitrate used to cauterize the hyper granular tissue.  Hydrofera blue was applied to wound and abdominal pad and Ace wrap.  Patient will leave in place and will return on Friday for a nurse visit and Wednesday for MD visit.    ICD-10-CM ICD-9-CM   1. Granuloma due to infection  B99.9 136.9         Plan:   Tissue pathology and/or culture taken:  [] Yes [x] No   Sharp debridement performed:   [] Yes [x] No   Labs ordered this visit:   [] Yes [x] No   Imaging ordered this visit:   [] Yes [x] No           Orders Placed This Encounter   Procedures    Change dressing     Silver nitrate applied to hypergranulation tissue today in wound care center   Hydrofera blue, abd pad, ace wrap   Leave in place and return for nurse visit Friday, MD visit on Wednesday    Will re-cauterize if needed at next visit if needed  May change secondary dressing as needed for excessive drainage        Follow up in about 2 days (around 10/6/2023) for nurse visit Friday, md visit Wednesday.

## 2023-10-04 NOTE — PATIENT INSTRUCTIONS
Silver nitrate applied to hypergranulation tissue today in wound care center   Hydrofera blue, abd pad, ace wrap   Leave in place and return for nurse visit Friday, MD visit on Wednesday    Will re-cauterize if needed at next visit if needed  May change secondary dressing as needed if excess drainage occurs

## 2023-10-06 ENCOUNTER — HOSPITAL ENCOUNTER (OUTPATIENT)
Dept: WOUND CARE | Facility: HOSPITAL | Age: 73
Discharge: HOME | End: 2023-10-06
Attending: PEDIATRICS
Payer: MEDICARE

## 2023-10-06 VITALS
OXYGEN SATURATION: 96 % | RESPIRATION RATE: 18 BRPM | HEART RATE: 76 BPM | DIASTOLIC BLOOD PRESSURE: 61 MMHG | TEMPERATURE: 99 F | SYSTOLIC BLOOD PRESSURE: 157 MMHG

## 2023-10-06 DIAGNOSIS — B99.9 GRANULOMA DUE TO INFECTION: Primary | ICD-10-CM

## 2023-10-06 PROCEDURE — 17250 CHEM CAUT OF GRANLTJ TISSUE: CPT

## 2023-10-06 PROCEDURE — 99213 OFFICE O/P EST LOW 20 MIN: CPT

## 2023-10-06 RX ORDER — SILVER NITRATE 38.21; 12.74 MG/1; MG/1
STICK TOPICAL
Status: DISPENSED
Start: 2023-10-06 | End: 2023-10-06

## 2023-10-06 NOTE — PROGRESS NOTES
[facility]      Subjective:     Patient seen in clinic today.  Dressing changed as ordered.      Assessment:          Altered Skin Integrity 08/16/23 1532 Right anterior Knee Abscess (Active)   08/16/23 1532   Altered Skin Integrity Present on Admission - Did Patient arrive to the hospital with altered skin?: yes   Side: Right   Orientation: anterior   Location: Knee   Wound Number:    Is this injury device related?:    Primary Wound Type: Abscess   Description of Altered Skin Integrity:    Ankle-Brachial Index:    Pulses:    Removal Indication and Assessment:    Wound Outcome:    (Retired) Wound Length (cm):    (Retired) Wound Width (cm):    (Retired) Depth (cm):    Wound Description (Comments):    Removal Indications:    Wound Image    10/06/23 1039   Dressing Appearance Intact;Moist drainage 10/06/23 1039   Drainage Amount Large 10/06/23 1039   Drainage Characteristics/Odor Serosanguineous;No odor;Bleeding controlled 10/06/23 1039   Appearance Red;Hypergranulation 10/06/23 1039   Tissue loss description Full thickness 10/06/23 1039   Red (%), Wound Tissue Color 100 % 10/06/23 1039   Periwound Area Scar tissue;Denuded 10/06/23 1039   Wound Edges Irregular 10/06/23 1039   Wound Length (cm) 0.9 cm 10/06/23 1039   Wound Width (cm) 0.8 cm 10/06/23 1039   Wound Depth (cm) 1 cm 10/06/23 1039   Wound Volume (cm^3) 0.72 cm^3 10/06/23 1039   Wound Surface Area (cm^2) 0.72 cm^2 10/06/23 1039   Care Cleansed with:;Sterile normal saline 10/06/23 1039   Dressing Applied;Methylene blue/gentian violet;Gauze;Absorptive Pad;Elastic bandage 10/06/23 1039         ICD-10-CM ICD-9-CM   1. Granuloma due to infection  B99.9 136.9           Orders Placed This Encounter   Procedures    Change dressing     Cleanse wound with: NS, soap and water, or wound cleanser  Lidocaine: Lidocaine 2% gel PRN in wound care center  Silver nitrate hypergranulation tissue  Periwound care: Skin prep PRN  Primary dressing: hydrofera  blue  Secondary dressing: abd , ace wrap  Offloading:  Edema control: elevate for edema control  Frequency: nurse visit Monday, md visit Wednesday     Standing Status:   Standing     Number of Occurrences:   1

## 2023-10-06 NOTE — PATIENT INSTRUCTIONS
Silver nitrate applied to hypergranulation tissue today in wound care center   Hydrofera blue, abd pad, ace wrap   Leave in place and return for nurse visit Monday, MD visit on Wednesday    Will re-cauterize if needed at next visit if needed  May change secondary dressing as needed if excess drainage occurs

## 2023-10-09 ENCOUNTER — HOSPITAL ENCOUNTER (OUTPATIENT)
Dept: WOUND CARE | Facility: HOSPITAL | Age: 73
Discharge: HOME | End: 2023-10-09
Attending: PEDIATRICS
Payer: MEDICARE

## 2023-10-09 VITALS
OXYGEN SATURATION: 98 % | RESPIRATION RATE: 18 BRPM | TEMPERATURE: 98 F | DIASTOLIC BLOOD PRESSURE: 63 MMHG | HEART RATE: 67 BPM | SYSTOLIC BLOOD PRESSURE: 145 MMHG

## 2023-10-09 DIAGNOSIS — B99.9 GRANULOMA DUE TO INFECTION: ICD-10-CM

## 2023-10-09 DIAGNOSIS — N18.4 CHRONIC KIDNEY DISEASE, STAGE 4 (SEVERE): Primary | ICD-10-CM

## 2023-10-09 PROCEDURE — 17250 CHEM CAUT OF GRANLTJ TISSUE: CPT

## 2023-10-09 RX ORDER — SILVER NITRATE 38.21; 12.74 MG/1; MG/1
STICK TOPICAL
Status: DISPENSED
Start: 2023-10-09 | End: 2023-10-09

## 2023-10-09 NOTE — PATIENT INSTRUCTIONS
Silver nitrate applied to hypergranulation tissue today in wound care center   Hydrofera blue,2x2 gauze, silicone foam, gauze, abd, ace, tubigrip  Leave in place and return for nurse visit Monday, MD visit on Wednesday    Will re-cauterize if needed at next visit if needed  May change secondary dressing as needed if excess drainage occurs

## 2023-10-11 ENCOUNTER — HOSPITAL ENCOUNTER (OUTPATIENT)
Dept: WOUND CARE | Facility: HOSPITAL | Age: 73
Discharge: HOME | End: 2023-10-11
Attending: PEDIATRICS
Payer: MEDICARE

## 2023-10-11 VITALS
TEMPERATURE: 99 F | DIASTOLIC BLOOD PRESSURE: 82 MMHG | SYSTOLIC BLOOD PRESSURE: 165 MMHG | RESPIRATION RATE: 18 BRPM | OXYGEN SATURATION: 99 % | HEART RATE: 70 BPM

## 2023-10-11 DIAGNOSIS — B99.9 GRANULOMA DUE TO INFECTION: Primary | ICD-10-CM

## 2023-10-11 PROCEDURE — 27000999 HC MEDICAL RECORD PHOTO DOCUMENTATION

## 2023-10-11 PROCEDURE — 99213 PR OFFICE/OUTPT VISIT, EST, LEVL III, 20-29 MIN: ICD-10-PCS | Mod: ,,, | Performed by: PEDIATRICS

## 2023-10-11 PROCEDURE — 99213 OFFICE O/P EST LOW 20 MIN: CPT | Mod: ,,, | Performed by: PEDIATRICS

## 2023-10-11 PROCEDURE — 99214 OFFICE O/P EST MOD 30 MIN: CPT

## 2023-10-11 PROCEDURE — 87070 CULTURE OTHR SPECIMN AEROBIC: CPT

## 2023-10-11 RX ORDER — CLINDAMYCIN HYDROCHLORIDE 300 MG/1
300 CAPSULE ORAL 3 TIMES DAILY
Qty: 30 CAPSULE | Refills: 0 | Status: SHIPPED | OUTPATIENT
Start: 2023-10-11 | End: 2023-10-21

## 2023-10-11 NOTE — PATIENT INSTRUCTIONS
Hemostatic gauze applied to open area, covered with gauze, abd, wrapped with ace wrap.    Leave dressing in place and return for nurse visit on Friday and Monday, md visit on Wednesday     antibiotic and take as prescribed.   New antibiotics may be ordered if needed when final culture results returned.     May change secondary dressing as needed if excess drainage occurs    If Warmth , redness or pain rapidly increase, or if you have fever and chills, seek emergency care.

## 2023-10-11 NOTE — PROGRESS NOTES
Subjective:       Patient ID: Rahel Bagley is a 73 y.o. female.    Chief Complaint: Non-healing Wound Follow Up (R knee ulceration.   Follow up with md for re-evaluation and treatment)    HPI  Review of Systems      Objective:      Temp:  [99.3 °F (37.4 °C)]   Pulse:  [70]   Resp:  [18]   BP: (165)/(82)   SpO2:  [99 %]   Physical Exam       Altered Skin Integrity 08/16/23 1532 Right anterior Knee Abscess (Active)   08/16/23 1532   Altered Skin Integrity Present on Admission - Did Patient arrive to the hospital with altered skin?: yes   Side: Right   Orientation: anterior   Location: Knee   Wound Number:    Is this injury device related?:    Primary Wound Type: Abscess   Description of Altered Skin Integrity:    Ankle-Brachial Index:    Pulses:    Removal Indication and Assessment:    Wound Outcome:    (Retired) Wound Length (cm):    (Retired) Wound Width (cm):    (Retired) Depth (cm):    Wound Description (Comments):    Removal Indications:    Wound Image     10/11/23 1557   Dressing Appearance Intact;Saturated 10/11/23 1557   Drainage Amount Copious 10/11/23 1557   Drainage Characteristics/Odor Serosanguineous;Creamy;No odor;Bleeding uncontrolled 10/11/23 1557   Appearance Red;Hypergranulation;Smooth 10/11/23 1557   Tissue loss description Full thickness 10/11/23 1557   Red (%), Wound Tissue Color 100 % 10/11/23 1557   Periwound Area Warm;Redness;Edematous 10/11/23 1557   Wound Edges Irregular 10/11/23 1557   Wound Length (cm) 1 cm 10/11/23 1557   Wound Width (cm) 1 cm 10/11/23 1557   Wound Depth (cm) 1 cm 10/11/23 1557   Wound Volume (cm^3) 1 cm^3 10/11/23 1557   Wound Surface Area (cm^2) 1 cm^2 10/11/23 1557   Care Cleansed with:;Sterile normal saline 10/11/23 1557   Dressing Applied 10/11/23 1557         Assessment:     Today wound is much larger.  This is 1 cm x 1 cm and has a depth of 1 cm.  This is continues the draining a serosanguineous fluid.  It is also bleeding.  Tissue is hyper granular.   Surrounding area of the wound this is erythematous with increased warmth.  Also pain.  Patient has a temperature of 99.2°.  Culture was taken.  Patient will be started on clindamycin.  Patient has had increased drainage over aperoid of several weeks.  Orthopedic surgeon wanted an MRI but Cardiology refused.  Cardiology does not want anything done until January.  This wound has gotten worse over this period of time and appears to be getting infected.  Patient was started on clindamycin.  Patient has kidney problems and MRI must be done without contrast.  We will attempt to consult the cardiologist to see if we can do an MRI.  We are concerned about the fact that patient might beginning more infected in the knee joint with this continued drainage.  Also concerned about increased infection affecting the patient's heart valves.  Patient had Omnicef that applied to stop the bleeding.  Patient is on Plavix.  Patient will return in 2 days for a nurse visit and in 1 week for MD visit.  Spoke with Dr. Seals's nurse and it was expressed to her that it is possible that this will need to be addressed in the near future.  The nurse said she would speak to the doctor but says his reply will be the same.  The nurse said the problem might be secondary to her defibrillator.  Dr. Seals does not want to stopped the Plavix until January.  We will contact her orthopedic surgeon and discussed the case with them.  Patient will return on Friday and then in 1 week for physician visit.    ICD-10-CM ICD-9-CM   1. Granuloma due to infection  B99.9 136.9         Plan:   Tissue pathology and/or culture taken:  [x] Yes [] No   Sharp debridement performed:   [] Yes [x] No Esther  Labs ordered this visit:   [] Yes [x] No   Imaging ordered this visit:   [] Yes [x] No           Orders Placed This Encounter   Procedures    Wound Culture    Change dressing     Hemostatic gauze applied to open area, covered with gauze, abd, wrapped with ace wrap.     Leave dressing in place and return for nurse visit on Friday and Monday, md visit on Wednesday     antibiotic and take as prescribed.   New antibiotics may be ordered if needed when final culture results returned.     May change secondary dressing as needed if excess drainage occurs    If Warmth , redness or pain rapidly increase, or if you have fever and chills, seek emergency care.        Follow up in about 2 days (around 10/13/2023) for nurse visit Friday, Monday, md visit Wednesday.

## 2023-10-12 ENCOUNTER — INFUSION (OUTPATIENT)
Dept: INFUSION THERAPY | Facility: HOSPITAL | Age: 73
End: 2023-10-12
Attending: INTERNAL MEDICINE
Payer: MEDICARE

## 2023-10-12 VITALS
OXYGEN SATURATION: 99 % | DIASTOLIC BLOOD PRESSURE: 74 MMHG | RESPIRATION RATE: 18 BRPM | HEIGHT: 62 IN | HEART RATE: 84 BPM | TEMPERATURE: 98 F | WEIGHT: 167.13 LBS | SYSTOLIC BLOOD PRESSURE: 157 MMHG | BODY MASS INDEX: 30.76 KG/M2

## 2023-10-12 DIAGNOSIS — D63.1 ANEMIA OF CHRONIC KIDNEY FAILURE, STAGE 4 (SEVERE): Primary | ICD-10-CM

## 2023-10-12 DIAGNOSIS — N18.4 ANEMIA OF CHRONIC KIDNEY FAILURE, STAGE 4 (SEVERE): Primary | ICD-10-CM

## 2023-10-12 DIAGNOSIS — N18.4 CHRONIC KIDNEY DISEASE, STAGE 4 (SEVERE): ICD-10-CM

## 2023-10-12 LAB
HCT VFR BLD AUTO: 38.6 % (ref 37–47)
HGB BLD-MCNC: 11.1 G/DL (ref 12–16)
HGB, POC: 11 G/DL (ref 12–16)

## 2023-10-12 PROCEDURE — 85014 HEMATOCRIT: CPT | Performed by: INTERNAL MEDICINE

## 2023-10-12 PROCEDURE — 36415 COLL VENOUS BLD VENIPUNCTURE: CPT

## 2023-10-13 ENCOUNTER — HOSPITAL ENCOUNTER (OUTPATIENT)
Dept: WOUND CARE | Facility: HOSPITAL | Age: 73
Discharge: HOME | End: 2023-10-13
Attending: PEDIATRICS
Payer: MEDICARE

## 2023-10-13 VITALS
SYSTOLIC BLOOD PRESSURE: 131 MMHG | TEMPERATURE: 98 F | OXYGEN SATURATION: 97 % | RESPIRATION RATE: 18 BRPM | HEART RATE: 70 BPM | DIASTOLIC BLOOD PRESSURE: 60 MMHG

## 2023-10-13 DIAGNOSIS — B99.9 GRANULOMA DUE TO INFECTION: Primary | ICD-10-CM

## 2023-10-13 PROCEDURE — 99213 OFFICE O/P EST LOW 20 MIN: CPT

## 2023-10-13 PROCEDURE — 27000999 HC MEDICAL RECORD PHOTO DOCUMENTATION

## 2023-10-13 NOTE — PATIENT INSTRUCTIONS
Hemostatic gauze applied to open area, covered with gauze, abd, wrapped with ace wrap.    Leave dressing in place and return for nurse visit on Friday and Monday, md visit on Wednesday     Continue antibiotic as prescribed  May change secondary dressing as needed if excess drainage occurs     If Warmth , redness or pain rapidly increase, or if you have fever and chills, seek emergency care.

## 2023-10-13 NOTE — PROGRESS NOTES
[facility]      Subjective:     Patient seen in clinic today.  Dressing changed as ordered.      Assessment:          Altered Skin Integrity 08/16/23 1532 Right anterior Knee Abscess (Active)   08/16/23 1532   Altered Skin Integrity Present on Admission - Did Patient arrive to the hospital with altered skin?: yes   Side: Right   Orientation: anterior   Location: Knee   Wound Number:    Is this injury device related?:    Primary Wound Type: Abscess   Description of Altered Skin Integrity:    Ankle-Brachial Index:    Pulses:    Removal Indication and Assessment:    Wound Outcome:    (Retired) Wound Length (cm):    (Retired) Wound Width (cm):    (Retired) Depth (cm):    Wound Description (Comments):    Removal Indications:    Wound Image   10/13/23 1130   Dressing Appearance Intact;Moist drainage 10/13/23 1130   Drainage Amount Large 10/13/23 1130   Drainage Characteristics/Odor Creamy;Serosanguineous;No odor;Bleeding controlled 10/13/23 1130   Appearance Red;Not granulating;Smooth 10/13/23 1130   Tissue loss description Full thickness 10/13/23 1130   Red (%), Wound Tissue Color 100 % 10/13/23 1130   Periwound Area Redness;Scar tissue 10/13/23 1130   Wound Edges Defined 10/13/23 1130   Wound Length (cm) 1 cm 10/13/23 1130   Wound Width (cm) 1 cm 10/13/23 1130   Wound Depth (cm) 0.5 cm 10/13/23 1130   Wound Volume (cm^3) 0.5 cm^3 10/13/23 1130   Wound Surface Area (cm^2) 1 cm^2 10/13/23 1130   Care Cleansed with:;Soap and water;Antimicrobial agent 10/13/23 1130   Dressing Applied;Other (comment);Gauze;Absorptive Pad;Elastic bandage 10/13/23 1130         ICD-10-CM ICD-9-CM   1. Granuloma due to infection  B99.9 136.9           Orders Placed This Encounter   Procedures    Change dressing     Cleanse with Soap and water, apply vashe moistened gauze to open area for 2 min,   Rinse with NS, pat dry, apply Hemostatic gauze applied to open area, covered with gauze, abd, wrapped with ace wrap.  Leave dressing in  place and return for nurse visit, dressing change on Monday, md visit on Wednesday     Continue antibiotic as prescribed  May change secondary dressing as needed if excess drainage occurs     If Warmth , redness or pain rapidly increase, or if you have fever and chills, seek emergency care.

## 2023-10-15 LAB — BACTERIA WND CULT: NORMAL

## 2023-10-16 ENCOUNTER — HOSPITAL ENCOUNTER (OUTPATIENT)
Dept: WOUND CARE | Facility: HOSPITAL | Age: 73
Discharge: HOME | End: 2023-10-16
Attending: PEDIATRICS
Payer: MEDICARE

## 2023-10-16 VITALS
RESPIRATION RATE: 18 BRPM | HEART RATE: 69 BPM | DIASTOLIC BLOOD PRESSURE: 66 MMHG | TEMPERATURE: 99 F | SYSTOLIC BLOOD PRESSURE: 133 MMHG | OXYGEN SATURATION: 98 %

## 2023-10-16 DIAGNOSIS — L97.912 NONHEALING ULCER OF RIGHT LOWER LEG WITH FAT LAYER EXPOSED: ICD-10-CM

## 2023-10-16 DIAGNOSIS — B99.9 GRANULOMA DUE TO INFECTION: Primary | ICD-10-CM

## 2023-10-16 PROCEDURE — 99213 OFFICE O/P EST LOW 20 MIN: CPT

## 2023-10-16 PROCEDURE — 27000999 HC MEDICAL RECORD PHOTO DOCUMENTATION

## 2023-10-16 NOTE — PROGRESS NOTES
[facility]      Subjective:     Patient seen in clinic today.  Dressing changed as ordered.      Assessment:          Altered Skin Integrity 08/16/23 1532 Right anterior Knee Abscess (Active)   08/16/23 1532   Altered Skin Integrity Present on Admission - Did Patient arrive to the hospital with altered skin?: yes   Side: Right   Orientation: anterior   Location: Knee   Wound Number:    Is this injury device related?:    Primary Wound Type: Abscess   Description of Altered Skin Integrity:    Ankle-Brachial Index:    Pulses:    Removal Indication and Assessment:    Wound Outcome:    (Retired) Wound Length (cm):    (Retired) Wound Width (cm):    (Retired) Depth (cm):    Wound Description (Comments):    Removal Indications:    Wound Image    10/16/23 1056   Dressing Appearance Intact;Moist drainage 10/16/23 1056   Drainage Amount Copious 10/16/23 1056   Drainage Characteristics/Odor Serosanguineous;Creamy;No odor;Bleeding controlled 10/16/23 1056   Appearance Red;Not granulating;Smooth 10/16/23 1056   Tissue loss description Full thickness 10/16/23 1056   Red (%), Wound Tissue Color 100 % 10/16/23 1056   Periwound Area Scar tissue;Swelling;Warm;Redness 10/16/23 1056   Wound Edges Defined 10/16/23 1056   Wound Length (cm) 1.5 cm 10/16/23 1056   Wound Width (cm) 1.5 cm 10/16/23 1056   Wound Depth (cm) 0.5 cm 10/16/23 1056   Wound Volume (cm^3) 1.125 cm^3 10/16/23 1056   Wound Surface Area (cm^2) 2.25 cm^2 10/16/23 1056   Care Cleansed with:;Antimicrobial agent 10/16/23 1056   Dressing Applied;Other (comment) 10/16/23 1056         ICD-10-CM ICD-9-CM   1. Granuloma due to infection  B99.9 136.9   2. Nonhealing ulcer of right lower leg with fat layer exposed  L97.912 707.19         Plan:   Pt reports increased pain to area.   Swelling, erythema, warmth and drainage remain. Dr. Douglas notified of assessment per telephone.   Orders for wound care remain the same at this time.   Pt reports taking antibiotics as  prescribed.   Culture results no growth.  Message left with Dr. Reyes's office on 10/11/23 with assessment of site and wound care physician's concern for fistula formation.   Awaiting call back.   Instructed pt to continue antibiotics as prescribed, seek emergency care if increased pain, warmth, erythema or fever/chills occur.   Pt verbalized understanding.          Orders Placed This Encounter   Procedures    Change dressing     Hemostatic gauze applied to open area, covered with gauze, abd, wrapped with ace wrap.    Leave dressing in place and return for nurse visit on Friday and Monday, md visit on Wednesday     Continue antibiotic as prescribed  May change secondary dressing as needed if excess drainage occurs     If Warmth , redness or pain rapidly increase, or if you have fever and chills, seek emergency care.

## 2023-10-18 ENCOUNTER — HOSPITAL ENCOUNTER (OUTPATIENT)
Dept: WOUND CARE | Facility: HOSPITAL | Age: 73
Discharge: HOME | End: 2023-10-18
Attending: PEDIATRICS
Payer: MEDICARE

## 2023-10-18 VITALS
SYSTOLIC BLOOD PRESSURE: 138 MMHG | TEMPERATURE: 99 F | RESPIRATION RATE: 18 BRPM | OXYGEN SATURATION: 97 % | HEART RATE: 64 BPM | DIASTOLIC BLOOD PRESSURE: 64 MMHG

## 2023-10-18 DIAGNOSIS — M25.10: Primary | ICD-10-CM

## 2023-10-18 PROCEDURE — 99213 OFFICE O/P EST LOW 20 MIN: CPT | Mod: ,,, | Performed by: PEDIATRICS

## 2023-10-18 PROCEDURE — 99214 OFFICE O/P EST MOD 30 MIN: CPT

## 2023-10-18 PROCEDURE — 27000999 HC MEDICAL RECORD PHOTO DOCUMENTATION

## 2023-10-18 PROCEDURE — 99213 PR OFFICE/OUTPT VISIT, EST, LEVL III, 20-29 MIN: ICD-10-PCS | Mod: ,,, | Performed by: PEDIATRICS

## 2023-10-18 NOTE — PROGRESS NOTES
Subjective:       Patient ID: Rahel Bagley is a 73 y.o. female.    Chief Complaint: Non-healing Wound Follow Up (R knee ulceration.   Pt reports it continues to drain large amount, red and swollen.   Here for follow up with md )    HPI  Review of Systems      Objective:      Temp:  [99 °F (37.2 °C)]   Pulse:  [64]   Resp:  [18]   BP: (138)/(64)   SpO2:  [97 %]   Physical Exam       Altered Skin Integrity 08/16/23 1532 Right anterior Knee Abscess (Active)   08/16/23 1532   Altered Skin Integrity Present on Admission - Did Patient arrive to the hospital with altered skin?: yes   Side: Right   Orientation: anterior   Location: Knee   Wound Number:    Is this injury device related?:    Primary Wound Type: Abscess   Description of Altered Skin Integrity:    Ankle-Brachial Index:    Pulses:    Removal Indication and Assessment:    Wound Outcome:    (Retired) Wound Length (cm):    (Retired) Wound Width (cm):    (Retired) Depth (cm):    Wound Description (Comments):    Removal Indications:    Wound Image   10/18/23 1121   Dressing Appearance Intact;Saturated 10/18/23 1121   Drainage Amount Copious 10/18/23 1121   Drainage Characteristics/Odor Sanguineous;Serosanguineous;Creamy 10/18/23 1121   Appearance Red;Not granulating;Granulating;Slough 10/18/23 1121   Tissue loss description Full thickness 10/18/23 1121   Red (%), Wound Tissue Color 100 % 10/18/23 1121   Periwound Area Scar tissue 10/18/23 1121   Wound Edges Defined 10/18/23 1121   Wound Length (cm) 1.5 cm 10/18/23 1121   Wound Width (cm) 1.5 cm 10/18/23 1121   Wound Depth (cm) 1 cm 10/18/23 1121   Wound Volume (cm^3) 2.25 cm^3 10/18/23 1121   Wound Surface Area (cm^2) 2.25 cm^2 10/18/23 1121   Care Cleansed with:;Antimicrobial agent 10/18/23 1121   Dressing Applied;Gauze;Absorptive Pad;Elastic bandage 10/18/23 1121         Assessment:     Again today the area is still draining a serosanguineous fluid.  This appears to be joint fluid.  When the knee is flex it  drains more.  Cultures are negative.  Spoke with Dr. Redd office and his nurse arranged for her to have an appointment with them on the 24th.  We have clearance now for MRI without contrast.  This was ordered by us today.  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  Area was cleaned and bandaged.  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 You should contact your local radiology department to determine if this is the case.)?     Answer:   Yes     Order Specific Question:   Does the patient have an aneurysm or surgical clip, pump, nerve/brain stimulator, middle/inner ear prosthesis, or other metal implant or foreign object (bullet, shrapnel)? If they have a card related to their implant, ask them to bring it. Issues related to the implant may cause the MRI to be delayed.     Answer:   Yes     Order Specific Question:   Will the patient require sedation?     Answer:   No     Order Specific Question:   May the Radiologist modify the order per protocol to meet the clinical needs of the patient?     Answer:   Yes     Order Specific Question:   Does the patient have on a skin patch for medication with aluminized backing?      Answer:   No     Order Specific Question:   Does the patient wear a continuous glucose monitor?     Answer:   No     Order Specific Question:   Is this part of a Research Study?     Answer:   No    MRI Knee Without Contrast Right     Standing Status:   Standing     Number of Occurrences:   1     Order Specific Question:   Does the patient have a pacemaker or a defibrillator (Note: Some facilities may not be able to schedule an MRI for patients with pacemakers and defibrillators. You should contact your local radiology department to determine if this is the case.)?     Answer:   Yes     Order Specific Question:   Does the patient have an aneurysm or surgical clip, pump, nerve/brain stimulator, middle/inner ear prosthesis, or other metal implant or foreign object (bullet, shrapnel)? If they have a card related to their implant, ask them to bring it. Issues related to the implant may cause the MRI to be delayed.     Answer:   Yes     Order Specific Question:   Will the patient require sedation?     Answer:   No     Order Specific Question:   May the Radiologist modify the order per protocol to meet the clinical needs of the patient?     Answer:   Yes     Order Specific Question:   Does the patient have on a skin patch for medication with aluminized backing?     Answer:   No     Order Specific Question:   Does the patient wear a continuous glucose monitor?     Answer:   No     Order Specific Question:   Is this part of a Research Study?     Answer:   No        Follow up for nurse visit Friday/monday.

## 2023-10-20 ENCOUNTER — HOSPITAL ENCOUNTER (OUTPATIENT)
Dept: WOUND CARE | Facility: HOSPITAL | Age: 73
Discharge: HOME | End: 2023-10-20
Attending: PEDIATRICS
Payer: MEDICARE

## 2023-10-20 VITALS
DIASTOLIC BLOOD PRESSURE: 58 MMHG | HEART RATE: 67 BPM | RESPIRATION RATE: 18 BRPM | SYSTOLIC BLOOD PRESSURE: 147 MMHG | OXYGEN SATURATION: 97 % | TEMPERATURE: 99 F

## 2023-10-20 DIAGNOSIS — M25.10: Primary | ICD-10-CM

## 2023-10-20 PROCEDURE — 99213 OFFICE O/P EST LOW 20 MIN: CPT

## 2023-10-20 PROCEDURE — 27000999 HC MEDICAL RECORD PHOTO DOCUMENTATION

## 2023-10-20 NOTE — PATIENT INSTRUCTIONS
Hemostatic gauze applied to open area, covered with gauze, abd, wrapped with ace wrap.    Leave dressing in place and return for nurse visit on Friday and Monday, md visit on Wednesday     Continue antibiotic until complete  May change secondary dressing as needed if excess drainage occurs     If Warmth , redness or pain rapidly increase, or if you have fever and chills, seek emergency care.       Have MRI preformed when scheduled, and follow up with Dr Reyes in his office OCTOBER 24th @ 2pm

## 2023-10-20 NOTE — PROGRESS NOTES
[facility]      Subjective:     Patient seen in clinic today.  Dressing changed as ordered.      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    Leave dressing in place and return for nurse visit on Friday and Monday, md visit on Wednesday     Continue antibiotic until complete  May change secondary dressing as needed if excess drainage occurs     If Warmth , redness or pain rapidly increase, or if you have fever and chills, seek emergency care.       Have MRI preformed when scheduled, and follow up with Dr Reyes in his office OCTOBER 24th @ 2pm

## 2023-10-23 ENCOUNTER — HOSPITAL ENCOUNTER (OUTPATIENT)
Dept: WOUND CARE | Facility: HOSPITAL | Age: 73
Discharge: HOME | End: 2023-10-23
Attending: PEDIATRICS
Payer: MEDICARE

## 2023-10-23 VITALS
TEMPERATURE: 98 F | OXYGEN SATURATION: 98 % | RESPIRATION RATE: 18 BRPM | SYSTOLIC BLOOD PRESSURE: 146 MMHG | DIASTOLIC BLOOD PRESSURE: 67 MMHG | HEART RATE: 73 BPM

## 2023-10-23 DIAGNOSIS — M25.10: Primary | ICD-10-CM

## 2023-10-23 PROCEDURE — 99213 OFFICE O/P EST LOW 20 MIN: CPT

## 2023-10-23 PROCEDURE — 27000999 HC MEDICAL RECORD PHOTO DOCUMENTATION

## 2023-10-23 NOTE — PROGRESS NOTES
[facility]      Subjective:     Patient seen in clinic today.  Dressing changed as ordered.      Assessment:          Altered Skin Integrity 08/16/23 1532 Right anterior Knee Abscess (Active)   08/16/23 1532   Altered Skin Integrity Present on Admission - Did Patient arrive to the hospital with altered skin?: yes   Side: Right   Orientation: anterior   Location: Knee   Wound Number:    Is this injury device related?:    Primary Wound Type: Abscess   Description of Altered Skin Integrity:    Ankle-Brachial Index:    Pulses:    Removal Indication and Assessment:    Wound Outcome:    (Retired) Wound Length (cm):    (Retired) Wound Width (cm):    (Retired) Depth (cm):    Wound Description (Comments):    Removal Indications:    Wound Image   10/23/23 1134   Dressing Appearance Intact;Saturated 10/23/23 1134   Drainage Amount Copious 10/23/23 1134   Drainage Characteristics/Odor Sanguineous;Creamy 10/23/23 1134   Appearance Red;Smooth;Not granulating;Hypergranulation 10/23/23 1134   Tissue loss description Full thickness 10/23/23 1134   Red (%), Wound Tissue Color 100 % 10/23/23 1134   Periwound Area Redness;Edematous 10/23/23 1134   Wound Edges Defined 10/23/23 1134   Wound Length (cm) 1.3 cm 10/23/23 1134   Wound Width (cm) 1.5 cm 10/23/23 1134   Wound Depth (cm) 1 cm 10/23/23 1134   Wound Volume (cm^3) 1.95 cm^3 10/23/23 1134   Wound Surface Area (cm^2) 1.95 cm^2 10/23/23 1134   Care Cleansed with:;Antimicrobial agent 10/23/23 1134   Dressing Applied;Gauze;Absorptive Pad;Elastic bandage 10/23/23 1134         ICD-10-CM ICD-9-CM   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    Leave dressing in place and return for nurse visit on Friday and Monday, md visit on Wednesday     Continue antibiotic until complete  May change secondary dressing as needed if  excess drainage occurs

## 2023-10-23 NOTE — PATIENT INSTRUCTIONS
Hemostatic gauze applied to open area, covered with gauze, abd, wrapped with ace wrap.    Leave dressing in place and return for nurse visit on Friday and Monday, md visit on Wednesday     Continue antibiotic until complete  May change secondary dressing as needed if excess drainage occurs

## 2023-10-25 ENCOUNTER — HOSPITAL ENCOUNTER (OUTPATIENT)
Dept: WOUND CARE | Facility: HOSPITAL | Age: 73
Discharge: HOME | End: 2023-10-25
Attending: PEDIATRICS
Payer: MEDICARE

## 2023-10-25 ENCOUNTER — INFUSION (OUTPATIENT)
Dept: INFUSION THERAPY | Facility: HOSPITAL | Age: 73
End: 2023-10-25
Attending: INTERNAL MEDICINE
Payer: MEDICARE

## 2023-10-25 VITALS
DIASTOLIC BLOOD PRESSURE: 60 MMHG | HEART RATE: 73 BPM | OXYGEN SATURATION: 97 % | TEMPERATURE: 98 F | SYSTOLIC BLOOD PRESSURE: 133 MMHG | BODY MASS INDEX: 30.76 KG/M2 | RESPIRATION RATE: 18 BRPM | HEIGHT: 62 IN | WEIGHT: 167.13 LBS

## 2023-10-25 VITALS
TEMPERATURE: 99 F | HEART RATE: 66 BPM | DIASTOLIC BLOOD PRESSURE: 60 MMHG | OXYGEN SATURATION: 98 % | SYSTOLIC BLOOD PRESSURE: 143 MMHG | RESPIRATION RATE: 18 BRPM

## 2023-10-25 DIAGNOSIS — D63.1 ANEMIA OF CHRONIC KIDNEY FAILURE, STAGE 4 (SEVERE): Primary | ICD-10-CM

## 2023-10-25 DIAGNOSIS — N18.4 ANEMIA OF CHRONIC KIDNEY FAILURE, STAGE 4 (SEVERE): Primary | ICD-10-CM

## 2023-10-25 DIAGNOSIS — M25.10: Primary | ICD-10-CM

## 2023-10-25 DIAGNOSIS — N18.4 CHRONIC KIDNEY DISEASE, STAGE 4 (SEVERE): ICD-10-CM

## 2023-10-25 LAB
HCT VFR BLD AUTO: 33.9 % (ref 37–47)
HGB BLD-MCNC: 10 G/DL (ref 12–16)
HGB, POC: 9.5 G/DL (ref 12–16)

## 2023-10-25 PROCEDURE — 96372 THER/PROPH/DIAG INJ SC/IM: CPT

## 2023-10-25 PROCEDURE — 63600175 PHARM REV CODE 636 W HCPCS: Mod: JZ,EC,TB | Performed by: INTERNAL MEDICINE

## 2023-10-25 PROCEDURE — 99213 PR OFFICE/OUTPT VISIT, EST, LEVL III, 20-29 MIN: ICD-10-PCS | Mod: ,,, | Performed by: PEDIATRICS

## 2023-10-25 PROCEDURE — 85014 HEMATOCRIT: CPT | Performed by: INTERNAL MEDICINE

## 2023-10-25 PROCEDURE — 99213 OFFICE O/P EST LOW 20 MIN: CPT | Mod: ,,, | Performed by: PEDIATRICS

## 2023-10-25 PROCEDURE — 36415 COLL VENOUS BLD VENIPUNCTURE: CPT

## 2023-10-25 PROCEDURE — 27000999 HC MEDICAL RECORD PHOTO DOCUMENTATION

## 2023-10-25 PROCEDURE — 99213 OFFICE O/P EST LOW 20 MIN: CPT

## 2023-10-25 RX ADMIN — EPOETIN ALFA-EPBX 10000 UNITS: 10000 INJECTION, SOLUTION INTRAVENOUS; SUBCUTANEOUS at 01:10

## 2023-10-25 NOTE — PROGRESS NOTES
Subjective:       Patient ID: Rahel Bagley is a 73 y.o. female.    Chief Complaint: Non-healing Wound Follow Up (R knee , suspected fistula.   Follow up with md for re-evaluation.   Reports MRI scheduled tomorrow. )    HPI  Review of Systems      Objective:      Temp:  [98.2 °F (36.8 °C)-99.4 °F (37.4 °C)]   Pulse:  [66-73]   Resp:  [18]   BP: (133-143)/(60)   SpO2:  [97 %-98 %]   Physical Exam       Altered Skin Integrity 08/16/23 1532 Right anterior Knee Abscess (Active)   08/16/23 1532   Altered Skin Integrity Present on Admission - Did Patient arrive to the hospital with altered skin?: yes   Side: Right   Orientation: anterior   Location: Knee   Wound Number:    Is this injury device related?:    Primary Wound Type: Abscess   Description of Altered Skin Integrity:    Ankle-Brachial Index:    Pulses:    Removal Indication and Assessment:    Wound Outcome:    (Retired) Wound Length (cm):    (Retired) Wound Width (cm):    (Retired) Depth (cm):    Wound Description (Comments):    Removal Indications:    Wound Image   10/25/23 1439   Dressing Appearance Intact;Moist drainage 10/25/23 1439   Drainage Amount Large 10/25/23 1439   Drainage Characteristics/Odor Clots;Creamy;Sanguineous;Serosanguineous;No odor 10/25/23 1439   Appearance Red;Not granulating;Smooth 10/25/23 1439   Tissue loss description Full thickness 10/25/23 1439   Red (%), Wound Tissue Color 100 % 10/25/23 1439   Periwound Area Redness;Edematous 10/25/23 1439   Wound Edges Jagged 10/25/23 1439   Wound Length (cm) 1.3 cm 10/25/23 1439   Wound Width (cm) 1.5 cm 10/25/23 1439   Wound Depth (cm) 1 cm 10/25/23 1439   Wound Volume (cm^3) 1.95 cm^3 10/25/23 1439   Wound Surface Area (cm^2) 1.95 cm^2 10/25/23 1439   Care Cleansed with:;Antimicrobial agent 10/25/23 1439   Dressing Applied;Absorptive Pad;Elastic bandage 10/25/23 1439         Assessment:     Today wound is still hyper granulated.  There is still a serosanguineous fluid that is draining.   Area was cleaned and hemostat gauze was applied and covered with gauze and wrapped with an Ace wrap.  Patient will return on Friday and Monday for nurse visit in 1 week for physician visit.  Patient is to have an MRI tomorrow and is to see the orthopedic surgeon next week.    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    Leave dressing in place and return for nurse visit on Friday and Monday, md visit on Wednesday    May change secondary dressing as needed if excess drainage occurs        Follow up in about 2 days (around 10/27/2023) for nurse visit Friday, Monday , md visit 1 week.

## 2023-10-25 NOTE — PATIENT INSTRUCTIONS
Hemostatic gauze applied to open area, covered with gauze, abd, wrapped with ace wrap.    Leave dressing in place and return for nurse visit on Friday and Monday, md visit on Wednesday    May change secondary dressing as needed if excess drainage occurs

## 2023-10-27 ENCOUNTER — HOSPITAL ENCOUNTER (OUTPATIENT)
Dept: WOUND CARE | Facility: HOSPITAL | Age: 73
Discharge: HOME | End: 2023-10-27
Attending: PEDIATRICS
Payer: MEDICARE

## 2023-10-27 VITALS
HEART RATE: 68 BPM | RESPIRATION RATE: 18 BRPM | TEMPERATURE: 97 F | SYSTOLIC BLOOD PRESSURE: 125 MMHG | DIASTOLIC BLOOD PRESSURE: 54 MMHG | OXYGEN SATURATION: 98 %

## 2023-10-27 DIAGNOSIS — M25.10: Primary | ICD-10-CM

## 2023-10-27 PROCEDURE — 27000999 HC MEDICAL RECORD PHOTO DOCUMENTATION

## 2023-10-27 PROCEDURE — 99213 OFFICE O/P EST LOW 20 MIN: CPT

## 2023-10-27 NOTE — PROGRESS NOTES
[facility]      Subjective:     Patient seen in clinic today.  Dressing changed as ordered.      Assessment:          Altered Skin Integrity 08/16/23 1532 Right anterior Knee Abscess (Active)   08/16/23 1532   Altered Skin Integrity Present on Admission - Did Patient arrive to the hospital with altered skin?: yes   Side: Right   Orientation: anterior   Location: Knee   Wound Number:    Is this injury device related?:    Primary Wound Type: Abscess   Description of Altered Skin Integrity:    Ankle-Brachial Index:    Pulses:    Removal Indication and Assessment:    Wound Outcome:    (Retired) Wound Length (cm):    (Retired) Wound Width (cm):    (Retired) Depth (cm):    Wound Description (Comments):    Removal Indications:    Dressing Appearance Intact;Moist drainage 10/27/23 1129   Drainage Amount Large 10/27/23 1129   Drainage Characteristics/Odor Clots;Creamy;Sanguineous;Serosanguineous;Bleeding controlled;No odor 10/27/23 1129   Appearance Red;Not granulating;Smooth 10/27/23 1129   Tissue loss description Full thickness 10/27/23 1129   Red (%), Wound Tissue Color 100 % 10/27/23 1129   Periwound Area Redness;Edematous 10/27/23 1129   Wound Edges Jagged 10/27/23 1129   Wound Length (cm) 1.3 cm 10/27/23 1129   Wound Width (cm) 1.5 cm 10/27/23 1129   Wound Depth (cm) 1 cm 10/27/23 1129   Wound Volume (cm^3) 1.95 cm^3 10/27/23 1129   Wound Surface Area (cm^2) 1.95 cm^2 10/27/23 1129   Care Cleansed with:;Sterile normal saline 10/27/23 1129   Dressing Applied;Absorptive Pad;Elastic bandage 10/27/23 1129         ICD-10-CM ICD-9-CM   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    Leave dressing in place and return for nurse visit on Friday and Monday, md visit on Wednesday     May change secondary dressing as needed if excess drainage occurs

## 2023-10-30 ENCOUNTER — HOSPITAL ENCOUNTER (OUTPATIENT)
Dept: WOUND CARE | Facility: HOSPITAL | Age: 73
Discharge: HOME | End: 2023-10-30
Attending: PEDIATRICS
Payer: MEDICARE

## 2023-10-30 VITALS
SYSTOLIC BLOOD PRESSURE: 124 MMHG | DIASTOLIC BLOOD PRESSURE: 74 MMHG | TEMPERATURE: 98 F | OXYGEN SATURATION: 95 % | HEART RATE: 65 BPM | RESPIRATION RATE: 20 BRPM

## 2023-10-30 DIAGNOSIS — M25.10: Primary | ICD-10-CM

## 2023-10-30 PROCEDURE — 27000999 HC MEDICAL RECORD PHOTO DOCUMENTATION

## 2023-10-30 PROCEDURE — 99213 OFFICE O/P EST LOW 20 MIN: CPT

## 2023-10-30 NOTE — PATIENT INSTRUCTIONS
Hemostatic gauze applied to open area, covered with gauze, abd, wrapped with ace wrap.    Leave dressing in place until you got to appointment with Dr. Reyes on Thursday  May change secondary dressing as needed if excess drainage occurs    Follow up visit with wound clinic will be made after visit with Dr. Reyes on Thursday

## 2023-10-30 NOTE — PROGRESS NOTES
[facility]      Subjective:     Patient seen in clinic today.  Dressing changed as ordered.      Assessment:          Altered Skin Integrity 08/16/23 1532 Right anterior Knee Abscess (Active)   08/16/23 1532   Altered Skin Integrity Present on Admission - Did Patient arrive to the hospital with altered skin?: yes   Side: Right   Orientation: anterior   Location: Knee   Wound Number:    Is this injury device related?:    Primary Wound Type: Abscess   Description of Altered Skin Integrity:    Ankle-Brachial Index:    Pulses:    Removal Indication and Assessment:    Wound Outcome:    (Retired) Wound Length (cm):    (Retired) Wound Width (cm):    (Retired) Depth (cm):    Wound Description (Comments):    Removal Indications:    Wound Image   10/30/23 1105   Dressing Appearance Intact;Moist drainage 10/30/23 1105   Drainage Amount Large 10/30/23 1105   Drainage Characteristics/Odor Creamy;Serosanguineous;No odor;Bleeding controlled;Sanguineous 10/30/23 1105   Appearance Red;Not granulating 10/30/23 1105   Tissue loss description Full thickness 10/30/23 1105   Red (%), Wound Tissue Color 100 % 10/30/23 1105   Periwound Area Edematous;Scar tissue;Redness 10/30/23 1105   Wound Edges Irregular 10/30/23 1105   Wound Length (cm) 1.3 cm 10/30/23 1105   Wound Width (cm) 1.5 cm 10/30/23 1105   Wound Depth (cm) 1 cm 10/30/23 1105   Wound Volume (cm^3) 1.95 cm^3 10/30/23 1105   Wound Surface Area (cm^2) 1.95 cm^2 10/30/23 1105   Care Cleansed with:;Antimicrobial agent 10/30/23 1105   Dressing Applied;Gauze;Absorptive Pad;Elastic bandage 10/30/23 1105         ICD-10-CM ICD-9-CM   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

## 2023-12-05 ENCOUNTER — INFUSION (OUTPATIENT)
Dept: INFUSION THERAPY | Facility: HOSPITAL | Age: 73
End: 2023-12-05
Attending: INTERNAL MEDICINE
Payer: MEDICARE

## 2023-12-05 VITALS
OXYGEN SATURATION: 97 % | WEIGHT: 167.13 LBS | HEART RATE: 69 BPM | RESPIRATION RATE: 18 BRPM | HEIGHT: 62 IN | DIASTOLIC BLOOD PRESSURE: 65 MMHG | TEMPERATURE: 98 F | SYSTOLIC BLOOD PRESSURE: 148 MMHG | BODY MASS INDEX: 30.76 KG/M2

## 2023-12-05 DIAGNOSIS — N18.4 ANEMIA OF CHRONIC KIDNEY FAILURE, STAGE 4 (SEVERE): Primary | ICD-10-CM

## 2023-12-05 DIAGNOSIS — N18.4 CHRONIC KIDNEY DISEASE, STAGE 4 (SEVERE): ICD-10-CM

## 2023-12-05 DIAGNOSIS — D63.1 ANEMIA OF CHRONIC KIDNEY FAILURE, STAGE 4 (SEVERE): Primary | ICD-10-CM

## 2023-12-05 LAB
BASOPHILS # BLD AUTO: 0.02 X10(3)/MCL
BASOPHILS NFR BLD AUTO: 0.4 %
EOSINOPHIL # BLD AUTO: 0.07 X10(3)/MCL (ref 0–0.9)
EOSINOPHIL NFR BLD AUTO: 1.5 %
ERYTHROCYTE [DISTWIDTH] IN BLOOD BY AUTOMATED COUNT: 16 % (ref 11.5–17)
FERRITIN SERPL-MCNC: 1034.7 NG/ML (ref 4.63–204)
HCT VFR BLD AUTO: 32.2 % (ref 37–47)
HGB BLD-MCNC: 9.8 G/DL (ref 12–16)
HGB, POC: 9.9 G/DL (ref 12–16)
IMM GRANULOCYTES # BLD AUTO: 0.01 X10(3)/MCL (ref 0–0.04)
IMM GRANULOCYTES NFR BLD AUTO: 0.2 %
IRON SATN MFR SERPL: 22 % (ref 20–50)
IRON SERPL-MCNC: 45 UG/DL (ref 50–170)
LYMPHOCYTES # BLD AUTO: 0.75 X10(3)/MCL (ref 0.6–4.6)
LYMPHOCYTES NFR BLD AUTO: 16 %
MCH RBC QN AUTO: 28.9 PG (ref 27–31)
MCHC RBC AUTO-ENTMCNC: 30.4 G/DL (ref 33–36)
MCV RBC AUTO: 95 FL (ref 80–94)
MONOCYTES # BLD AUTO: 0.49 X10(3)/MCL (ref 0.1–1.3)
MONOCYTES NFR BLD AUTO: 10.4 %
NEUTROPHILS # BLD AUTO: 3.36 X10(3)/MCL (ref 2.1–9.2)
NEUTROPHILS NFR BLD AUTO: 71.5 %
PLATELET # BLD AUTO: 272 X10(3)/MCL (ref 130–400)
PMV BLD AUTO: 10 FL (ref 7.4–10.4)
RBC # BLD AUTO: 3.39 X10(6)/MCL (ref 4.2–5.4)
TIBC SERPL-MCNC: 160 UG/DL (ref 70–310)
TIBC SERPL-MCNC: 205 UG/DL (ref 250–450)
TRANSFERRIN SERPL-MCNC: 190 MG/DL (ref 173–360)
WBC # SPEC AUTO: 4.7 X10(3)/MCL (ref 4.5–11.5)

## 2023-12-05 PROCEDURE — 36415 COLL VENOUS BLD VENIPUNCTURE: CPT

## 2023-12-05 PROCEDURE — 63600175 PHARM REV CODE 636 W HCPCS: Mod: JZ,EC,TB | Performed by: INTERNAL MEDICINE

## 2023-12-05 PROCEDURE — 96372 THER/PROPH/DIAG INJ SC/IM: CPT

## 2023-12-05 PROCEDURE — 83540 ASSAY OF IRON: CPT

## 2023-12-05 PROCEDURE — 85025 COMPLETE CBC W/AUTO DIFF WBC: CPT | Performed by: INTERNAL MEDICINE

## 2023-12-05 PROCEDURE — 82728 ASSAY OF FERRITIN: CPT | Performed by: INTERNAL MEDICINE

## 2023-12-05 RX ADMIN — EPOETIN ALFA-EPBX 10000 UNITS: 10000 INJECTION, SOLUTION INTRAVENOUS; SUBCUTANEOUS at 01:12

## 2023-12-18 ENCOUNTER — HOSPITAL ENCOUNTER (EMERGENCY)
Facility: HOSPITAL | Age: 73
Discharge: HOME | End: 2023-12-18
Attending: FAMILY MEDICINE
Payer: MEDICARE

## 2023-12-18 VITALS
HEART RATE: 76 BPM | HEIGHT: 62 IN | TEMPERATURE: 99 F | WEIGHT: 162 LBS | RESPIRATION RATE: 18 BRPM | BODY MASS INDEX: 29.81 KG/M2 | OXYGEN SATURATION: 97 % | SYSTOLIC BLOOD PRESSURE: 136 MMHG | DIASTOLIC BLOOD PRESSURE: 60 MMHG

## 2023-12-18 DIAGNOSIS — L03.90 CELLULITIS, UNSPECIFIED CELLULITIS SITE: ICD-10-CM

## 2023-12-18 DIAGNOSIS — Z51.89 VISIT FOR WOUND CHECK: Primary | ICD-10-CM

## 2023-12-18 PROCEDURE — 96372 THER/PROPH/DIAG INJ SC/IM: CPT | Performed by: FAMILY MEDICINE

## 2023-12-18 PROCEDURE — 99284 EMERGENCY DEPT VISIT MOD MDM: CPT

## 2023-12-18 PROCEDURE — 63600175 PHARM REV CODE 636 W HCPCS: Performed by: FAMILY MEDICINE

## 2023-12-18 RX ORDER — CEFTRIAXONE 1 G/1
1 INJECTION, POWDER, FOR SOLUTION INTRAMUSCULAR; INTRAVENOUS
Status: COMPLETED | OUTPATIENT
Start: 2023-12-18 | End: 2023-12-18

## 2023-12-18 RX ORDER — CLINDAMYCIN HYDROCHLORIDE 150 MG/1
300 CAPSULE ORAL 4 TIMES DAILY
Qty: 56 CAPSULE | Refills: 0 | Status: SHIPPED | OUTPATIENT
Start: 2023-12-18 | End: 2023-12-25

## 2023-12-18 RX ADMIN — CEFTRIAXONE SODIUM 1 G: 1 INJECTION, POWDER, FOR SOLUTION INTRAMUSCULAR; INTRAVENOUS at 12:12
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
## 2023-12-20 ENCOUNTER — INFUSION (OUTPATIENT)
Dept: INFUSION THERAPY | Facility: HOSPITAL | Age: 73
End: 2023-12-20
Attending: INTERNAL MEDICINE
Payer: MEDICARE

## 2023-12-20 VITALS
DIASTOLIC BLOOD PRESSURE: 82 MMHG | HEART RATE: 76 BPM | SYSTOLIC BLOOD PRESSURE: 149 MMHG | TEMPERATURE: 98 F | HEIGHT: 62 IN | RESPIRATION RATE: 18 BRPM | OXYGEN SATURATION: 96 % | WEIGHT: 162.06 LBS | BODY MASS INDEX: 29.82 KG/M2

## 2023-12-20 DIAGNOSIS — N18.4 ANEMIA OF CHRONIC KIDNEY FAILURE, STAGE 4 (SEVERE): Primary | ICD-10-CM

## 2023-12-20 DIAGNOSIS — D63.1 ANEMIA OF CHRONIC KIDNEY FAILURE, STAGE 4 (SEVERE): Primary | ICD-10-CM

## 2023-12-20 DIAGNOSIS — N18.4 CHRONIC KIDNEY DISEASE, STAGE 4 (SEVERE): ICD-10-CM

## 2023-12-20 LAB
HCT VFR BLD AUTO: 31.5 % (ref 37–47)
HGB BLD-MCNC: 9.5 G/DL (ref 12–16)
HGB, POC: 8.8 G/DL (ref 12–16)

## 2023-12-20 PROCEDURE — 96372 THER/PROPH/DIAG INJ SC/IM: CPT

## 2023-12-20 PROCEDURE — 36415 COLL VENOUS BLD VENIPUNCTURE: CPT

## 2023-12-20 PROCEDURE — 85014 HEMATOCRIT: CPT | Performed by: INTERNAL MEDICINE

## 2023-12-20 PROCEDURE — 63600175 PHARM REV CODE 636 W HCPCS: Mod: EC,TB | Performed by: INTERNAL MEDICINE

## 2023-12-20 RX ADMIN — ERYTHROPOIETIN 12600 UNITS: 20000 INJECTION, SOLUTION INTRAVENOUS; SUBCUTANEOUS at 10:12

## 2023-12-20 NOTE — PROGRESS NOTES
Hgb 8.8 -procrit increased by 25% per md order-pt received 12,500units   Orientation to room/Bed in low position, brakes on

## 2024-01-01 ENCOUNTER — HOSPITAL ENCOUNTER (INPATIENT)
Facility: HOSPITAL | Age: 74
LOS: 1 days | DRG: 871 | End: 2024-08-27
Attending: STUDENT IN AN ORGANIZED HEALTH CARE EDUCATION/TRAINING PROGRAM | Admitting: INTERNAL MEDICINE
Payer: MEDICARE

## 2024-01-01 VITALS
OXYGEN SATURATION: 100 % | BODY MASS INDEX: 25.76 KG/M2 | SYSTOLIC BLOOD PRESSURE: 105 MMHG | HEART RATE: 80 BPM | WEIGHT: 140 LBS | RESPIRATION RATE: 18 BRPM | DIASTOLIC BLOOD PRESSURE: 59 MMHG | HEIGHT: 62 IN | TEMPERATURE: 98 F

## 2024-01-01 DIAGNOSIS — I21.4 NSTEMI (NON-ST ELEVATED MYOCARDIAL INFARCTION): ICD-10-CM

## 2024-01-01 DIAGNOSIS — E16.2 HYPOGLYCEMIA: ICD-10-CM

## 2024-01-01 DIAGNOSIS — A41.9 SEPSIS, DUE TO UNSPECIFIED ORGANISM, UNSPECIFIED WHETHER ACUTE ORGAN DYSFUNCTION PRESENT: ICD-10-CM

## 2024-01-01 DIAGNOSIS — R41.89 UNRESPONSIVE: Primary | ICD-10-CM

## 2024-01-01 DIAGNOSIS — Z97.8 ENDOTRACHEALLY INTUBATED: ICD-10-CM

## 2024-01-01 DIAGNOSIS — I50.9 CHF (CONGESTIVE HEART FAILURE): ICD-10-CM

## 2024-01-01 DIAGNOSIS — R00.0 TACHYCARDIA: ICD-10-CM

## 2024-01-01 DIAGNOSIS — I49.3 PVC (PREMATURE VENTRICULAR CONTRACTION): ICD-10-CM

## 2024-01-01 LAB
ABO + RH BLD: NORMAL
ABORH RETYPE: NORMAL
ALBUMIN SERPL-MCNC: 2.5 G/DL (ref 3.4–4.8)
ALBUMIN SERPL-MCNC: 2.7 G/DL (ref 3.4–4.8)
ALBUMIN SERPL-MCNC: 3.2 G/DL (ref 3.4–4.8)
ALBUMIN/GLOB SERPL: 1 RATIO (ref 1.1–2)
ALBUMIN/GLOB SERPL: 1 RATIO (ref 1.1–2)
ALBUMIN/GLOB SERPL: 1.1 RATIO (ref 1.1–2)
ALLENS TEST BLOOD GAS (OHS): ABNORMAL
ALLENS TEST BLOOD GAS (OHS): YES
ALP SERPL-CCNC: 50 UNIT/L (ref 40–150)
ALP SERPL-CCNC: 55 UNIT/L (ref 40–150)
ALP SERPL-CCNC: 64 UNIT/L (ref 40–150)
ALT SERPL-CCNC: 169 UNIT/L (ref 0–55)
ALT SERPL-CCNC: 322 UNIT/L (ref 0–55)
ALT SERPL-CCNC: 344 UNIT/L (ref 0–55)
AMORPH URATE CRY URNS QL MICRO: ABNORMAL /UL
ANION GAP SERPL CALC-SCNC: 13 MEQ/L
ANION GAP SERPL CALC-SCNC: 18 MEQ/L
ANION GAP SERPL CALC-SCNC: 22 MMOL/L (ref 8–16)
ANION GAP SERPL CALC-SCNC: 28 MEQ/L
AST SERPL-CCNC: 1027 UNIT/L (ref 5–34)
AST SERPL-CCNC: 1124 UNIT/L (ref 5–34)
AST SERPL-CCNC: 464 UNIT/L (ref 5–34)
BACTERIA #/AREA URNS AUTO: ABNORMAL /HPF
BASE EXCESS BLD CALC-SCNC: 17.5 MMOL/L (ref -2–2)
BASE EXCESS BLD CALC-SCNC: 7.9 MMOL/L (ref -2–2)
BASOPHILS # BLD AUTO: 0.03 X10(3)/MCL
BASOPHILS # BLD AUTO: 0.03 X10(3)/MCL
BASOPHILS NFR BLD AUTO: 0.4 %
BASOPHILS NFR BLD AUTO: 0.4 %
BILIRUB SERPL-MCNC: 4.3 MG/DL
BILIRUB SERPL-MCNC: 4.9 MG/DL
BILIRUB SERPL-MCNC: 5.1 MG/DL
BILIRUB UR QL STRIP.AUTO: ABNORMAL
BLD PROD TYP BPU: NORMAL
BLOOD GAS SAMPLE TYPE (OHS): ABNORMAL
BLOOD GAS SAMPLE TYPE (OHS): ABNORMAL
BLOOD UNIT EXPIRATION DATE: NORMAL
BLOOD UNIT TYPE CODE: 5100
BNP BLD-MCNC: 6232 PG/ML
BUN SERPL-MCNC: 68.7 MG/DL (ref 9.8–20.1)
BUN SERPL-MCNC: 69.6 MG/DL (ref 9.8–20.1)
BUN SERPL-MCNC: 71 MG/DL (ref 6–30)
BUN SERPL-MCNC: 72.4 MG/DL (ref 9.8–20.1)
CA-I BLD-SCNC: 1 MMOL/L (ref 1.12–1.23)
CA-I BLD-SCNC: 1.16 MMOL/L (ref 1.12–1.23)
CALCIUM SERPL-MCNC: 10.5 MG/DL (ref 8.4–10.2)
CALCIUM SERPL-MCNC: 9.3 MG/DL (ref 8.4–10.2)
CALCIUM SERPL-MCNC: 9.5 MG/DL (ref 8.4–10.2)
CHLORIDE SERPL-SCNC: 90 MMOL/L (ref 98–107)
CHLORIDE SERPL-SCNC: 90 MMOL/L (ref 98–107)
CHLORIDE SERPL-SCNC: 92 MMOL/L (ref 95–110)
CHLORIDE SERPL-SCNC: 92 MMOL/L (ref 98–107)
CLARITY UR: ABNORMAL
CO2 BLDA-SCNC: 34.8 MMOL/L
CO2 BLDA-SCNC: 42.7 MMOL/L
CO2 SERPL-SCNC: 22 MMOL/L (ref 23–31)
CO2 SERPL-SCNC: 29 MMOL/L (ref 23–31)
CO2 SERPL-SCNC: 31 MMOL/L (ref 23–31)
COHGB MFR BLDA: 2.4 % (ref 0.5–1.5)
COHGB MFR BLDA: 2.5 % (ref 0.5–1.5)
COLOR UR AUTO: YELLOW
CREAT SERPL-MCNC: 1.93 MG/DL (ref 0.55–1.02)
CREAT SERPL-MCNC: 2.02 MG/DL (ref 0.55–1.02)
CREAT SERPL-MCNC: 2.17 MG/DL (ref 0.55–1.02)
CREAT SERPL-MCNC: 2.4 MG/DL (ref 0.5–1.4)
CREAT/UREA NIT SERPL: 33
CREAT/UREA NIT SERPL: 34
CREAT/UREA NIT SERPL: 36
CROSSMATCH INTERPRETATION: NORMAL
DISPENSE STATUS: NORMAL
DRAWN BY BLOOD GAS (OHS): ABNORMAL
DRAWN BY BLOOD GAS (OHS): ABNORMAL
EOSINOPHIL # BLD AUTO: 0 X10(3)/MCL (ref 0–0.9)
EOSINOPHIL # BLD AUTO: 0.01 X10(3)/MCL (ref 0–0.9)
EOSINOPHIL NFR BLD AUTO: 0 %
EOSINOPHIL NFR BLD AUTO: 0.1 %
ERYTHROCYTE [DISTWIDTH] IN BLOOD BY AUTOMATED COUNT: 20 % (ref 11.5–17)
ERYTHROCYTE [DISTWIDTH] IN BLOOD BY AUTOMATED COUNT: 20.8 % (ref 11.5–17)
FLUAV AG UPPER RESP QL IA.RAPID: NOT DETECTED
FLUBV AG UPPER RESP QL IA.RAPID: NOT DETECTED
GFR SERPLBLD CREATININE-BSD FMLA CKD-EPI: 23 ML/MIN/1.73/M2
GFR SERPLBLD CREATININE-BSD FMLA CKD-EPI: 25 ML/MIN/1.73/M2
GFR SERPLBLD CREATININE-BSD FMLA CKD-EPI: 27 ML/MIN/1.73/M2
GLOBULIN SER-MCNC: 2.3 GM/DL (ref 2.4–3.5)
GLOBULIN SER-MCNC: 2.6 GM/DL (ref 2.4–3.5)
GLOBULIN SER-MCNC: 3.3 GM/DL (ref 2.4–3.5)
GLUCOSE SERPL-MCNC: 161 MG/DL (ref 70–110)
GLUCOSE SERPL-MCNC: 161 MG/DL (ref 82–115)
GLUCOSE SERPL-MCNC: 234 MG/DL (ref 82–115)
GLUCOSE SERPL-MCNC: 306 MG/DL (ref 82–115)
GLUCOSE UR QL STRIP: ABNORMAL
GROUP & RH: NORMAL
HCO3 BLDA-SCNC: 33.3 MMOL/L (ref 22–26)
HCO3 BLDA-SCNC: 41.3 MMOL/L (ref 22–26)
HCT VFR BLD AUTO: 32.5 % (ref 37–47)
HCT VFR BLD AUTO: 44.5 % (ref 37–47)
HCT VFR BLD CALC: 46 %PCV (ref 36–54)
HGB BLD-MCNC: 10.8 G/DL (ref 12–16)
HGB BLD-MCNC: 13.9 G/DL (ref 12–16)
HGB BLD-MCNC: 16 G/DL
HGB UR QL STRIP: ABNORMAL
HYALINE CASTS #/AREA URNS LPF: ABNORMAL /LPF
IMM GRANULOCYTES # BLD AUTO: 0.02 X10(3)/MCL (ref 0–0.04)
IMM GRANULOCYTES # BLD AUTO: 0.03 X10(3)/MCL (ref 0–0.04)
IMM GRANULOCYTES NFR BLD AUTO: 0.3 %
IMM GRANULOCYTES NFR BLD AUTO: 0.4 %
INDIRECT COOMBS: NORMAL
INHALED O2 CONCENTRATION: 50 %
INHALED O2 CONCENTRATION: 50 %
INR PPP: 7.3
INR PPP: >15
INR PPP: >15
KETONES UR QL STRIP: NEGATIVE
LACTATE SERPL-SCNC: 1.3 MMOL/L (ref 0.5–2.2)
LACTATE SERPL-SCNC: 12.2 MMOL/L (ref 0.5–2.2)
LACTATE SERPL-SCNC: 5.5 MMOL/L (ref 0.5–2.2)
LACTATE SERPL-SCNC: 7.8 MMOL/L (ref 0.5–2.2)
LEUKOCYTE ESTERASE UR QL STRIP: 75
LYMPHOCYTES # BLD AUTO: 0.51 X10(3)/MCL (ref 0.6–4.6)
LYMPHOCYTES # BLD AUTO: 0.72 X10(3)/MCL (ref 0.6–4.6)
LYMPHOCYTES NFR BLD AUTO: 10.1 %
LYMPHOCYTES NFR BLD AUTO: 6.3 %
MAGNESIUM SERPL-MCNC: 2.1 MG/DL (ref 1.6–2.6)
MAGNESIUM SERPL-MCNC: 2.2 MG/DL (ref 1.6–2.6)
MAGNESIUM SERPL-MCNC: 2.7 MG/DL (ref 1.6–2.6)
MCH RBC QN AUTO: 30.1 PG (ref 27–31)
MCH RBC QN AUTO: 30.1 PG (ref 27–31)
MCHC RBC AUTO-ENTMCNC: 31.2 G/DL (ref 33–36)
MCHC RBC AUTO-ENTMCNC: 33.2 G/DL (ref 33–36)
MCV RBC AUTO: 90.5 FL (ref 80–94)
MCV RBC AUTO: 96.3 FL (ref 80–94)
MECH RR (OHS): 20 B/MIN
MECH RR (OHS): 20 B/MIN
METHGB MFR BLDA: 1 % (ref 0.4–1.5)
METHGB MFR BLDA: 1.1 % (ref 0.4–1.5)
MODE (OHS): AC
MODE (OHS): AC
MONOCYTES # BLD AUTO: 0.54 X10(3)/MCL (ref 0.1–1.3)
MONOCYTES # BLD AUTO: 0.65 X10(3)/MCL (ref 0.1–1.3)
MONOCYTES NFR BLD AUTO: 6.7 %
MONOCYTES NFR BLD AUTO: 9.1 %
MRSA PCR SCRN (OHS): NOT DETECTED
MUCOUS THREADS URNS QL MICRO: ABNORMAL /LPF
NEUTROPHILS # BLD AUTO: 5.74 X10(3)/MCL (ref 2.1–9.2)
NEUTROPHILS # BLD AUTO: 6.98 X10(3)/MCL (ref 2.1–9.2)
NEUTROPHILS NFR BLD AUTO: 80.1 %
NEUTROPHILS NFR BLD AUTO: 86.1 %
NITRITE UR QL STRIP: NEGATIVE
NRBC BLD AUTO-RTO: 0 %
NRBC BLD AUTO-RTO: 1.4 %
O2 HB BLOOD GAS (OHS): 93.6 % (ref 94–97)
O2 HB BLOOD GAS (OHS): 97.4 % (ref 94–97)
OHS QRS DURATION: 142 MS
OHS QRS DURATION: 184 MS
OHS QTC CALCULATION: 491 MS
OHS QTC CALCULATION: 541 MS
OXYGEN DEVICE BLOOD GAS (OHS): ABNORMAL
OXYGEN DEVICE BLOOD GAS (OHS): ABNORMAL
OXYHGB MFR BLDA: 10.8 G/DL (ref 12–16)
OXYHGB MFR BLDA: 11.7 G/DL (ref 12–16)
PCO2 BLDA: 44 MMHG (ref 35–45)
PCO2 BLDA: 49 MMHG (ref 35–45)
PEEP RESPIRATORY: 5 CMH2O
PEEP RESPIRATORY: 5 CMH2O
PH BLDA: 7.44 [PH] (ref 7.35–7.45)
PH BLDA: 7.58 [PH] (ref 7.35–7.45)
PH UR STRIP: 5.5 [PH]
PHOSPHATE SERPL-MCNC: 3.3 MG/DL (ref 2.3–4.7)
PHOSPHATE SERPL-MCNC: 4.4 MG/DL (ref 2.3–4.7)
PLATELET # BLD AUTO: 118 X10(3)/MCL (ref 130–400)
PLATELET # BLD AUTO: 82 X10(3)/MCL (ref 130–400)
PLATELETS.RETICULATED NFR BLD AUTO: 6.8 % (ref 0.9–11.2)
PMV BLD AUTO: 12.5 FL (ref 7.4–10.4)
PMV BLD AUTO: 12.5 FL (ref 7.4–10.4)
PO2 BLDA: 216 MMHG (ref 80–100)
PO2 BLDA: 73 MMHG (ref 80–100)
POC IONIZED CALCIUM: 0.97 MMOL/L (ref 1.06–1.42)
POC TCO2 (MEASURED): 26 MMOL/L (ref 23–29)
POCT GLUCOSE: 166 MG/DL (ref 70–110)
POCT GLUCOSE: 337 MG/DL (ref 70–110)
POCT GLUCOSE: >500 MG/DL (ref 70–110)
POTASSIUM BLD-SCNC: 4.9 MMOL/L (ref 3.5–5.1)
POTASSIUM BLOOD GAS (OHS): 3.5 MMOL/L (ref 3.5–5)
POTASSIUM BLOOD GAS (OHS): 3.8 MMOL/L (ref 3.5–5)
POTASSIUM SERPL-SCNC: 3.8 MMOL/L (ref 3.5–5.1)
POTASSIUM SERPL-SCNC: 4.5 MMOL/L (ref 3.5–5.1)
POTASSIUM SERPL-SCNC: 5.5 MMOL/L (ref 3.5–5.1)
PROT SERPL-MCNC: 4.8 GM/DL (ref 5.8–7.6)
PROT SERPL-MCNC: 5.3 GM/DL (ref 5.8–7.6)
PROT SERPL-MCNC: 6.5 GM/DL (ref 5.8–7.6)
PROT UR QL STRIP: ABNORMAL
PROTHROMBIN TIME: 62.2 SECONDS (ref 12.5–14.5)
PROTHROMBIN TIME: >120 SECONDS (ref 12.5–14.5)
PROTHROMBIN TIME: >120 SECONDS (ref 12.5–14.5)
RBC # BLD AUTO: 3.59 X10(6)/MCL (ref 4.2–5.4)
RBC # BLD AUTO: 4.62 X10(6)/MCL (ref 4.2–5.4)
RBC #/AREA URNS AUTO: ABNORMAL /HPF
RSV A 5' UTR RNA NPH QL NAA+PROBE: NOT DETECTED
SAMPLE SITE BLOOD GAS (OHS): ABNORMAL
SAMPLE SITE BLOOD GAS (OHS): ABNORMAL
SAMPLE: ABNORMAL
SAO2 % BLDA: 95 %
SAO2 % BLDA: 99.8 %
SARS-COV-2 RNA RESP QL NAA+PROBE: NOT DETECTED
SODIUM BLD-SCNC: 134 MMOL/L (ref 136–145)
SODIUM BLOOD GAS (OHS): 130 MMOL/L (ref 137–145)
SODIUM BLOOD GAS (OHS): 132 MMOL/L (ref 137–145)
SODIUM SERPL-SCNC: 136 MMOL/L (ref 136–145)
SODIUM SERPL-SCNC: 137 MMOL/L (ref 136–145)
SODIUM SERPL-SCNC: 140 MMOL/L (ref 136–145)
SP GR UR STRIP.AUTO: 1.01 (ref 1–1.03)
SPECIMEN OUTDATE: NORMAL
SPONT+MECH VT ON VENT: 450 ML
SPONT+MECH VT ON VENT: 450 ML
SQUAMOUS #/AREA URNS LPF: ABNORMAL /HPF
T4 FREE SERPL-MCNC: 1.3 NG/DL (ref 0.7–1.48)
TROPONIN I SERPL-MCNC: 0.29 NG/ML (ref 0–0.04)
TROPONIN I SERPL-MCNC: 1.96 NG/ML (ref 0–0.04)
TROPONIN I SERPL-MCNC: 1.99 NG/ML (ref 0–0.04)
TSH SERPL-ACNC: 5.33 UIU/ML (ref 0.35–4.94)
UNIT NUMBER: NORMAL
UROBILINOGEN UR STRIP-ACNC: NORMAL
WBC # BLD AUTO: 7.16 X10(3)/MCL (ref 4.5–11.5)
WBC # BLD AUTO: 8.1 X10(3)/MCL (ref 4.5–11.5)
WBC #/AREA URNS AUTO: ABNORMAL /HPF
YEAST BUDDING URNS QL: ABNORMAL /HPF

## 2024-01-01 PROCEDURE — 36620 INSERTION CATHETER ARTERY: CPT

## 2024-01-01 PROCEDURE — 85025 COMPLETE CBC W/AUTO DIFF WBC: CPT | Performed by: STUDENT IN AN ORGANIZED HEALTH CARE EDUCATION/TRAINING PROGRAM

## 2024-01-01 PROCEDURE — 93010 ELECTROCARDIOGRAM REPORT: CPT | Mod: ,,, | Performed by: INTERNAL MEDICINE

## 2024-01-01 PROCEDURE — 85610 PROTHROMBIN TIME: CPT

## 2024-01-01 PROCEDURE — 27100171 HC OXYGEN HIGH FLOW UP TO 24 HOURS

## 2024-01-01 PROCEDURE — 81001 URINALYSIS AUTO W/SCOPE: CPT | Performed by: STUDENT IN AN ORGANIZED HEALTH CARE EDUCATION/TRAINING PROGRAM

## 2024-01-01 PROCEDURE — 82803 BLOOD GASES ANY COMBINATION: CPT

## 2024-01-01 PROCEDURE — 83735 ASSAY OF MAGNESIUM: CPT

## 2024-01-01 PROCEDURE — 86850 RBC ANTIBODY SCREEN: CPT | Performed by: INTERNAL MEDICINE

## 2024-01-01 PROCEDURE — 63600175 PHARM REV CODE 636 W HCPCS: Performed by: STUDENT IN AN ORGANIZED HEALTH CARE EDUCATION/TRAINING PROGRAM

## 2024-01-01 PROCEDURE — 83605 ASSAY OF LACTIC ACID: CPT | Performed by: STUDENT IN AN ORGANIZED HEALTH CARE EDUCATION/TRAINING PROGRAM

## 2024-01-01 PROCEDURE — 94003 VENT MGMT INPAT SUBQ DAY: CPT

## 2024-01-01 PROCEDURE — 63600175 PHARM REV CODE 636 W HCPCS

## 2024-01-01 PROCEDURE — 31500 INSERT EMERGENCY AIRWAY: CPT

## 2024-01-01 PROCEDURE — 94760 N-INVAS EAR/PLS OXIMETRY 1: CPT

## 2024-01-01 PROCEDURE — 80053 COMPREHEN METABOLIC PANEL: CPT | Performed by: STUDENT IN AN ORGANIZED HEALTH CARE EDUCATION/TRAINING PROGRAM

## 2024-01-01 PROCEDURE — 93005 ELECTROCARDIOGRAM TRACING: CPT

## 2024-01-01 PROCEDURE — 84443 ASSAY THYROID STIM HORMONE: CPT | Performed by: STUDENT IN AN ORGANIZED HEALTH CARE EDUCATION/TRAINING PROGRAM

## 2024-01-01 PROCEDURE — 36600 WITHDRAWAL OF ARTERIAL BLOOD: CPT

## 2024-01-01 PROCEDURE — 25000003 PHARM REV CODE 250: Performed by: STUDENT IN AN ORGANIZED HEALTH CARE EDUCATION/TRAINING PROGRAM

## 2024-01-01 PROCEDURE — 25000003 PHARM REV CODE 250

## 2024-01-01 PROCEDURE — P9017 PLASMA 1 DONOR FRZ W/IN 8 HR: HCPCS

## 2024-01-01 PROCEDURE — 99900035 HC TECH TIME PER 15 MIN (STAT)

## 2024-01-01 PROCEDURE — 36415 COLL VENOUS BLD VENIPUNCTURE: CPT

## 2024-01-01 PROCEDURE — 85025 COMPLETE CBC W/AUTO DIFF WBC: CPT

## 2024-01-01 PROCEDURE — 93010 ELECTROCARDIOGRAM REPORT: CPT | Mod: 76,,, | Performed by: INTERNAL MEDICINE

## 2024-01-01 PROCEDURE — 27200966 HC CLOSED SUCTION SYSTEM

## 2024-01-01 PROCEDURE — 5A1935Z RESPIRATORY VENTILATION, LESS THAN 24 CONSECUTIVE HOURS: ICD-10-PCS | Performed by: STUDENT IN AN ORGANIZED HEALTH CARE EDUCATION/TRAINING PROGRAM

## 2024-01-01 PROCEDURE — 87641 MR-STAPH DNA AMP PROBE: CPT | Performed by: STUDENT IN AN ORGANIZED HEALTH CARE EDUCATION/TRAINING PROGRAM

## 2024-01-01 PROCEDURE — 63600175 PHARM REV CODE 636 W HCPCS: Performed by: INTERNAL MEDICINE

## 2024-01-01 PROCEDURE — 51702 INSERT TEMP BLADDER CATH: CPT

## 2024-01-01 PROCEDURE — 80053 COMPREHEN METABOLIC PANEL: CPT

## 2024-01-01 PROCEDURE — 83605 ASSAY OF LACTIC ACID: CPT

## 2024-01-01 PROCEDURE — 27100080 HC AIRWAY ADAPTER-END TIDAL CO2

## 2024-01-01 PROCEDURE — 99900031 HC PATIENT EDUCATION (STAT)

## 2024-01-01 PROCEDURE — 96365 THER/PROPH/DIAG IV INF INIT: CPT

## 2024-01-01 PROCEDURE — 0241U COVID/RSV/FLU A&B PCR: CPT | Performed by: STUDENT IN AN ORGANIZED HEALTH CARE EDUCATION/TRAINING PROGRAM

## 2024-01-01 PROCEDURE — 85610 PROTHROMBIN TIME: CPT | Performed by: STUDENT IN AN ORGANIZED HEALTH CARE EDUCATION/TRAINING PROGRAM

## 2024-01-01 PROCEDURE — 83880 ASSAY OF NATRIURETIC PEPTIDE: CPT

## 2024-01-01 PROCEDURE — 87070 CULTURE OTHR SPECIMN AEROBIC: CPT | Performed by: INTERNAL MEDICINE

## 2024-01-01 PROCEDURE — 84484 ASSAY OF TROPONIN QUANT: CPT | Performed by: STUDENT IN AN ORGANIZED HEALTH CARE EDUCATION/TRAINING PROGRAM

## 2024-01-01 PROCEDURE — 84100 ASSAY OF PHOSPHORUS: CPT

## 2024-01-01 PROCEDURE — 94002 VENT MGMT INPAT INIT DAY: CPT

## 2024-01-01 PROCEDURE — 87040 BLOOD CULTURE FOR BACTERIA: CPT | Performed by: STUDENT IN AN ORGANIZED HEALTH CARE EDUCATION/TRAINING PROGRAM

## 2024-01-01 PROCEDURE — 86901 BLOOD TYPING SEROLOGIC RH(D): CPT | Performed by: INTERNAL MEDICINE

## 2024-01-01 PROCEDURE — 86900 BLOOD TYPING SEROLOGIC ABO: CPT | Performed by: INTERNAL MEDICINE

## 2024-01-01 PROCEDURE — 36415 COLL VENOUS BLD VENIPUNCTURE: CPT | Performed by: INTERNAL MEDICINE

## 2024-01-01 PROCEDURE — 84439 ASSAY OF FREE THYROXINE: CPT

## 2024-01-01 PROCEDURE — 27000513 HC SENSOR FLOTRAC

## 2024-01-01 PROCEDURE — 84484 ASSAY OF TROPONIN QUANT: CPT

## 2024-01-01 PROCEDURE — 99291 CRITICAL CARE FIRST HOUR: CPT

## 2024-01-01 PROCEDURE — 99900026 HC AIRWAY MAINTENANCE (STAT)

## 2024-01-01 PROCEDURE — 83605 ASSAY OF LACTIC ACID: CPT | Performed by: INTERNAL MEDICINE

## 2024-01-01 PROCEDURE — 83735 ASSAY OF MAGNESIUM: CPT | Performed by: STUDENT IN AN ORGANIZED HEALTH CARE EDUCATION/TRAINING PROGRAM

## 2024-01-01 PROCEDURE — 20000000 HC ICU ROOM

## 2024-01-01 PROCEDURE — 0BH17EZ INSERTION OF ENDOTRACHEAL AIRWAY INTO TRACHEA, VIA NATURAL OR ARTIFICIAL OPENING: ICD-10-PCS | Performed by: STUDENT IN AN ORGANIZED HEALTH CARE EDUCATION/TRAINING PROGRAM

## 2024-01-01 PROCEDURE — 37799 UNLISTED PX VASCULAR SURGERY: CPT

## 2024-01-01 RX ORDER — SODIUM CHLORIDE, SODIUM LACTATE, POTASSIUM CHLORIDE, CALCIUM CHLORIDE 600; 310; 30; 20 MG/100ML; MG/100ML; MG/100ML; MG/100ML
INJECTION, SOLUTION INTRAVENOUS CONTINUOUS
Status: DISCONTINUED | OUTPATIENT
Start: 2024-01-01 | End: 2024-01-01

## 2024-01-01 RX ORDER — PROPOFOL 10 MG/ML
INJECTION, EMULSION INTRAVENOUS
Status: DISPENSED
Start: 2024-01-01 | End: 2024-01-01

## 2024-01-01 RX ORDER — PROPOFOL 10 MG/ML
0-50 INJECTION, EMULSION INTRAVENOUS CONTINUOUS
Status: DISCONTINUED | OUTPATIENT
Start: 2024-01-01 | End: 2024-01-01

## 2024-01-01 RX ORDER — NOREPINEPHRINE BITARTRATE/D5W 8 MG/250ML
0-3 PLASTIC BAG, INJECTION (ML) INTRAVENOUS CONTINUOUS
Status: DISCONTINUED | OUTPATIENT
Start: 2024-01-01 | End: 2024-01-01

## 2024-01-01 RX ORDER — MORPHINE SULFATE 4 MG/ML
4 INJECTION, SOLUTION INTRAMUSCULAR; INTRAVENOUS
Status: COMPLETED | OUTPATIENT
Start: 2024-01-01 | End: 2024-01-01

## 2024-01-01 RX ORDER — ROCURONIUM BROMIDE 10 MG/ML
INJECTION, SOLUTION INTRAVENOUS CODE/TRAUMA/SEDATION MEDICATION
Status: DISCONTINUED | OUTPATIENT
Start: 2024-01-01 | End: 2024-01-01

## 2024-01-01 RX ORDER — LORAZEPAM 2 MG/ML
2 INJECTION INTRAMUSCULAR
Status: DISCONTINUED | OUTPATIENT
Start: 2024-01-01 | End: 2024-01-01 | Stop reason: HOSPADM

## 2024-01-01 RX ORDER — MUPIROCIN 20 MG/G
OINTMENT TOPICAL 2 TIMES DAILY
Status: DISCONTINUED | OUTPATIENT
Start: 2024-08-28 | End: 2024-01-01

## 2024-01-01 RX ORDER — GLUCAGON 1 MG
1 KIT INJECTION
Status: DISCONTINUED | OUTPATIENT
Start: 2024-01-01 | End: 2024-01-01

## 2024-01-01 RX ORDER — PANTOPRAZOLE SODIUM 40 MG/10ML
80 INJECTION, POWDER, LYOPHILIZED, FOR SOLUTION INTRAVENOUS ONCE
Status: COMPLETED | OUTPATIENT
Start: 2024-01-01 | End: 2024-01-01

## 2024-01-01 RX ORDER — INSULIN ASPART 100 [IU]/ML
0-5 INJECTION, SOLUTION INTRAVENOUS; SUBCUTANEOUS EVERY 6 HOURS PRN
Status: DISCONTINUED | OUTPATIENT
Start: 2024-01-01 | End: 2024-01-01

## 2024-01-01 RX ORDER — PANTOPRAZOLE SODIUM 40 MG/10ML
40 INJECTION, POWDER, LYOPHILIZED, FOR SOLUTION INTRAVENOUS DAILY
Status: DISCONTINUED | OUTPATIENT
Start: 2024-01-01 | End: 2024-01-01

## 2024-01-01 RX ORDER — HYDROCODONE BITARTRATE AND ACETAMINOPHEN 500; 5 MG/1; MG/1
TABLET ORAL
Status: DISCONTINUED | OUTPATIENT
Start: 2024-01-01 | End: 2024-01-01

## 2024-01-01 RX ORDER — ETOMIDATE 2 MG/ML
INJECTION INTRAVENOUS CODE/TRAUMA/SEDATION MEDICATION
Status: DISCONTINUED | OUTPATIENT
Start: 2024-01-01 | End: 2024-01-01

## 2024-01-01 RX ORDER — PANTOPRAZOLE SODIUM 40 MG/10ML
40 INJECTION, POWDER, LYOPHILIZED, FOR SOLUTION INTRAVENOUS 2 TIMES DAILY
Status: DISCONTINUED | OUTPATIENT
Start: 2024-01-01 | End: 2024-01-01

## 2024-01-01 RX ADMIN — PROPOFOL 5 MCG/KG/MIN: 10 INJECTION, EMULSION INTRAVENOUS at 08:08

## 2024-01-01 RX ADMIN — SODIUM CHLORIDE, POTASSIUM CHLORIDE, SODIUM LACTATE AND CALCIUM CHLORIDE: 600; 310; 30; 20 INJECTION, SOLUTION INTRAVENOUS at 10:08

## 2024-01-01 RX ADMIN — MORPHINE SULFATE 4 MG: 4 INJECTION, SOLUTION INTRAMUSCULAR; INTRAVENOUS at 12:08

## 2024-01-01 RX ADMIN — VANCOMYCIN HYDROCHLORIDE 1250 MG: 1.25 INJECTION, POWDER, LYOPHILIZED, FOR SOLUTION INTRAVENOUS at 09:08

## 2024-01-01 RX ADMIN — LORAZEPAM 2 MG: 2 INJECTION INTRAMUSCULAR; INTRAVENOUS at 11:08

## 2024-01-01 RX ADMIN — PANTOPRAZOLE SODIUM 80 MG: 40 INJECTION, POWDER, LYOPHILIZED, FOR SOLUTION INTRAVENOUS at 11:08

## 2024-01-01 RX ADMIN — LORAZEPAM 2 MG: 2 INJECTION INTRAMUSCULAR; INTRAVENOUS at 12:08

## 2024-01-01 RX ADMIN — NOREPINEPHRINE BITARTRATE 0.02 MCG/KG/MIN: 8 INJECTION, SOLUTION INTRAVENOUS at 11:08

## 2024-01-01 RX ADMIN — MORPHINE SULFATE 4 MG: 4 INJECTION, SOLUTION INTRAMUSCULAR; INTRAVENOUS at 11:08

## 2024-01-01 RX ADMIN — SODIUM CHLORIDE, POTASSIUM CHLORIDE, SODIUM LACTATE AND CALCIUM CHLORIDE: 600; 310; 30; 20 INJECTION, SOLUTION INTRAVENOUS at 11:08

## 2024-01-01 RX ADMIN — DEXTROSE MONOHYDRATE 1000 ML: 100 INJECTION, SOLUTION INTRAVENOUS at 06:08

## 2024-01-01 RX ADMIN — PIPERACILLIN AND TAZOBACTAM 4.5 G: 4; .5 INJECTION, POWDER, LYOPHILIZED, FOR SOLUTION INTRAVENOUS; PARENTERAL at 04:08

## 2024-01-01 RX ADMIN — ETOMIDATE 20 MG: 2 INJECTION INTRAVENOUS at 06:08

## 2024-01-01 RX ADMIN — PROPOFOL 30 MCG/KG/MIN: 10 INJECTION, EMULSION INTRAVENOUS at 06:08

## 2024-01-01 RX ADMIN — ROCURONIUM BROMIDE 100 MG: 10 SOLUTION INTRAVENOUS at 06:08

## 2024-01-01 RX ADMIN — PANTOPRAZOLE SODIUM 40 MG: 40 INJECTION, POWDER, LYOPHILIZED, FOR SOLUTION INTRAVENOUS at 08:08

## 2024-01-01 RX ADMIN — SODIUM CHLORIDE, POTASSIUM CHLORIDE, SODIUM LACTATE AND CALCIUM CHLORIDE 1905 ML: 600; 310; 30; 20 INJECTION, SOLUTION INTRAVENOUS at 07:08

## 2024-01-01 RX ADMIN — PIPERACILLIN AND TAZOBACTAM 4.5 G: 4; .5 INJECTION, POWDER, LYOPHILIZED, FOR SOLUTION INTRAVENOUS; PARENTERAL at 07:08

## 2024-01-04 ENCOUNTER — INFUSION (OUTPATIENT)
Dept: INFUSION THERAPY | Facility: HOSPITAL | Age: 74
End: 2024-01-04
Attending: INTERNAL MEDICINE
Payer: MEDICARE

## 2024-01-04 VITALS
RESPIRATION RATE: 18 BRPM | DIASTOLIC BLOOD PRESSURE: 58 MMHG | TEMPERATURE: 98 F | HEART RATE: 73 BPM | WEIGHT: 162.06 LBS | HEIGHT: 62 IN | OXYGEN SATURATION: 98 % | SYSTOLIC BLOOD PRESSURE: 151 MMHG | BODY MASS INDEX: 29.82 KG/M2

## 2024-01-04 DIAGNOSIS — N18.4 ANEMIA OF CHRONIC KIDNEY FAILURE, STAGE 4 (SEVERE): Primary | ICD-10-CM

## 2024-01-04 DIAGNOSIS — N18.4 CHRONIC KIDNEY DISEASE, STAGE 4 (SEVERE): ICD-10-CM

## 2024-01-04 DIAGNOSIS — D63.1 ANEMIA OF CHRONIC KIDNEY FAILURE, STAGE 4 (SEVERE): Primary | ICD-10-CM

## 2024-01-04 LAB
HCT VFR BLD AUTO: 34.3 % (ref 37–47)
HGB BLD-MCNC: 10.4 G/DL (ref 12–16)
HGB, POC: 10.2 G/DL (ref 12–16)

## 2024-01-04 PROCEDURE — 85018 HEMOGLOBIN: CPT | Performed by: INTERNAL MEDICINE

## 2024-01-04 PROCEDURE — 63600175 PHARM REV CODE 636 W HCPCS: Mod: JZ,EC,TB | Performed by: INTERNAL MEDICINE

## 2024-01-04 PROCEDURE — 36415 COLL VENOUS BLD VENIPUNCTURE: CPT

## 2024-01-04 PROCEDURE — 96372 THER/PROPH/DIAG INJ SC/IM: CPT

## 2024-01-04 RX ADMIN — EPOETIN ALFA-EPBX 10000 UNITS: 10000 INJECTION, SOLUTION INTRAVENOUS; SUBCUTANEOUS at 11:01

## 2024-01-10 ENCOUNTER — LAB VISIT (OUTPATIENT)
Dept: LAB | Facility: HOSPITAL | Age: 74
End: 2024-01-10
Attending: INTERNAL MEDICINE
Payer: MEDICARE

## 2024-01-10 DIAGNOSIS — I50.22 CHRONIC SYSTOLIC HEART FAILURE: Primary | ICD-10-CM

## 2024-01-10 DIAGNOSIS — Z79.899 ENCOUNTER FOR LONG-TERM (CURRENT) USE OF OTHER MEDICATIONS: ICD-10-CM

## 2024-01-10 LAB
ALBUMIN SERPL-MCNC: 2.9 G/DL (ref 3.4–4.8)
ALBUMIN/GLOB SERPL: 0.8 RATIO (ref 1.1–2)
ALP SERPL-CCNC: 95 UNIT/L (ref 40–150)
ALT SERPL-CCNC: 9 UNIT/L (ref 0–55)
AST SERPL-CCNC: 20 UNIT/L (ref 5–34)
BILIRUB SERPL-MCNC: 0.9 MG/DL
BUN SERPL-MCNC: 19.3 MG/DL (ref 9.8–20.1)
CALCIUM SERPL-MCNC: 9.6 MG/DL (ref 8.4–10.2)
CHLORIDE SERPL-SCNC: 107 MMOL/L (ref 98–107)
CO2 SERPL-SCNC: 27 MMOL/L (ref 23–31)
CREAT SERPL-MCNC: 0.97 MG/DL (ref 0.55–1.02)
ERYTHROCYTE [DISTWIDTH] IN BLOOD BY AUTOMATED COUNT: 14.5 % (ref 11.5–17)
GFR SERPLBLD CREATININE-BSD FMLA CKD-EPI: >60 MLS/MIN/1.73/M2
GLOBULIN SER-MCNC: 3.6 GM/DL (ref 2.4–3.5)
GLUCOSE SERPL-MCNC: 156 MG/DL (ref 82–115)
HCT VFR BLD AUTO: 31.3 % (ref 37–47)
HGB BLD-MCNC: 9.4 G/DL (ref 12–16)
MCH RBC QN AUTO: 28.7 PG (ref 27–31)
MCHC RBC AUTO-ENTMCNC: 30 G/DL (ref 33–36)
MCV RBC AUTO: 95.4 FL (ref 80–94)
PLATELET # BLD AUTO: 236 X10(3)/MCL (ref 130–400)
PMV BLD AUTO: 11.1 FL (ref 7.4–10.4)
POTASSIUM SERPL-SCNC: 3.8 MMOL/L (ref 3.5–5.1)
PROT SERPL-MCNC: 6.5 GM/DL (ref 5.8–7.6)
RBC # BLD AUTO: 3.28 X10(6)/MCL (ref 4.2–5.4)
SODIUM SERPL-SCNC: 143 MMOL/L (ref 136–145)
WBC # SPEC AUTO: 3.08 X10(3)/MCL (ref 4.5–11.5)

## 2024-01-10 PROCEDURE — 36415 COLL VENOUS BLD VENIPUNCTURE: CPT

## 2024-01-10 PROCEDURE — 85027 COMPLETE CBC AUTOMATED: CPT

## 2024-01-10 PROCEDURE — 80053 COMPREHEN METABOLIC PANEL: CPT

## 2024-01-18 ENCOUNTER — INFUSION (OUTPATIENT)
Dept: INFUSION THERAPY | Facility: HOSPITAL | Age: 74
End: 2024-01-18
Attending: INTERNAL MEDICINE
Payer: MEDICARE

## 2024-01-18 VITALS
DIASTOLIC BLOOD PRESSURE: 69 MMHG | SYSTOLIC BLOOD PRESSURE: 159 MMHG | BODY MASS INDEX: 29.82 KG/M2 | OXYGEN SATURATION: 97 % | TEMPERATURE: 98 F | RESPIRATION RATE: 18 BRPM | HEIGHT: 62 IN | HEART RATE: 74 BPM | WEIGHT: 162.06 LBS

## 2024-01-18 DIAGNOSIS — D63.1 ANEMIA OF CHRONIC KIDNEY FAILURE, STAGE 4 (SEVERE): Primary | ICD-10-CM

## 2024-01-18 DIAGNOSIS — N18.4 ANEMIA OF CHRONIC KIDNEY FAILURE, STAGE 4 (SEVERE): Primary | ICD-10-CM

## 2024-01-18 DIAGNOSIS — N18.4 CHRONIC KIDNEY DISEASE, STAGE 4 (SEVERE): ICD-10-CM

## 2024-01-18 LAB
HCT VFR BLD AUTO: 36.2 % (ref 37–47)
HGB BLD-MCNC: 10.6 G/DL (ref 12–16)
HGB, POC: 10.8 G/DL (ref 12–16)

## 2024-01-18 PROCEDURE — 36415 COLL VENOUS BLD VENIPUNCTURE: CPT

## 2024-01-18 PROCEDURE — 85018 HEMOGLOBIN: CPT | Performed by: INTERNAL MEDICINE

## 2024-01-30 ENCOUNTER — INFUSION (OUTPATIENT)
Dept: INFUSION THERAPY | Facility: HOSPITAL | Age: 74
End: 2024-01-30
Attending: INTERNAL MEDICINE
Payer: MEDICARE

## 2024-01-30 VITALS
TEMPERATURE: 99 F | BODY MASS INDEX: 29.82 KG/M2 | HEIGHT: 62 IN | RESPIRATION RATE: 18 BRPM | DIASTOLIC BLOOD PRESSURE: 64 MMHG | WEIGHT: 162.06 LBS | OXYGEN SATURATION: 98 % | HEART RATE: 70 BPM | SYSTOLIC BLOOD PRESSURE: 127 MMHG

## 2024-01-30 DIAGNOSIS — N18.4 CHRONIC KIDNEY DISEASE, STAGE 4 (SEVERE): ICD-10-CM

## 2024-01-30 DIAGNOSIS — D63.1 ANEMIA OF CHRONIC KIDNEY FAILURE, STAGE 4 (SEVERE): Primary | ICD-10-CM

## 2024-01-30 DIAGNOSIS — N18.4 ANEMIA OF CHRONIC KIDNEY FAILURE, STAGE 4 (SEVERE): Primary | ICD-10-CM

## 2024-01-30 LAB
HCT VFR BLD AUTO: 33.2 % (ref 37–47)
HGB BLD-MCNC: 9.9 G/DL (ref 12–16)
HGB, POC: 9.6 G/DL (ref 12–16)

## 2024-01-30 PROCEDURE — 85018 HEMOGLOBIN: CPT | Performed by: INTERNAL MEDICINE

## 2024-01-30 PROCEDURE — 63600175 PHARM REV CODE 636 W HCPCS: Mod: JZ,EC,TB | Performed by: INTERNAL MEDICINE

## 2024-01-30 PROCEDURE — 96372 THER/PROPH/DIAG INJ SC/IM: CPT

## 2024-01-30 PROCEDURE — 36415 COLL VENOUS BLD VENIPUNCTURE: CPT

## 2024-01-30 RX ADMIN — EPOETIN ALFA-EPBX 10000 UNITS: 10000 INJECTION, SOLUTION INTRAVENOUS; SUBCUTANEOUS at 11:01

## 2024-02-14 ENCOUNTER — INFUSION (OUTPATIENT)
Dept: INFUSION THERAPY | Facility: HOSPITAL | Age: 74
End: 2024-02-14
Attending: INTERNAL MEDICINE
Payer: MEDICARE

## 2024-02-14 VITALS
HEIGHT: 62 IN | SYSTOLIC BLOOD PRESSURE: 158 MMHG | OXYGEN SATURATION: 97 % | RESPIRATION RATE: 18 BRPM | DIASTOLIC BLOOD PRESSURE: 70 MMHG | BODY MASS INDEX: 29.82 KG/M2 | TEMPERATURE: 98 F | WEIGHT: 162.06 LBS | HEART RATE: 71 BPM

## 2024-02-14 DIAGNOSIS — N18.4 ANEMIA OF CHRONIC KIDNEY FAILURE, STAGE 4 (SEVERE): Primary | ICD-10-CM

## 2024-02-14 DIAGNOSIS — N18.4 CHRONIC KIDNEY DISEASE, STAGE 4 (SEVERE): ICD-10-CM

## 2024-02-14 DIAGNOSIS — D63.1 ANEMIA OF CHRONIC KIDNEY FAILURE, STAGE 4 (SEVERE): Primary | ICD-10-CM

## 2024-02-14 LAB
HCT VFR BLD AUTO: 35.6 % (ref 37–47)
HGB BLD-MCNC: 10.8 G/DL (ref 12–16)
HGB, POC: 11.2 G/DL (ref 12–16)

## 2024-02-14 PROCEDURE — 85018 HEMOGLOBIN: CPT | Performed by: INTERNAL MEDICINE

## 2024-02-14 PROCEDURE — 36415 COLL VENOUS BLD VENIPUNCTURE: CPT

## 2024-02-28 ENCOUNTER — INFUSION (OUTPATIENT)
Dept: INFUSION THERAPY | Facility: HOSPITAL | Age: 74
End: 2024-02-28
Attending: INTERNAL MEDICINE
Payer: MEDICARE

## 2024-02-28 VITALS
TEMPERATURE: 99 F | OXYGEN SATURATION: 96 % | HEIGHT: 62 IN | WEIGHT: 162.06 LBS | BODY MASS INDEX: 29.82 KG/M2 | SYSTOLIC BLOOD PRESSURE: 174 MMHG | DIASTOLIC BLOOD PRESSURE: 69 MMHG | HEART RATE: 88 BPM | RESPIRATION RATE: 18 BRPM

## 2024-02-28 DIAGNOSIS — D63.1 ANEMIA OF CHRONIC KIDNEY FAILURE, STAGE 4 (SEVERE): Primary | ICD-10-CM

## 2024-02-28 DIAGNOSIS — N18.4 ANEMIA OF CHRONIC KIDNEY FAILURE, STAGE 4 (SEVERE): Primary | ICD-10-CM

## 2024-02-28 DIAGNOSIS — N18.4 CHRONIC KIDNEY DISEASE, STAGE 4 (SEVERE): ICD-10-CM

## 2024-02-28 LAB
BASOPHILS # BLD AUTO: 0.02 X10(3)/MCL
BASOPHILS NFR BLD AUTO: 0.4 %
EOSINOPHIL # BLD AUTO: 0.1 X10(3)/MCL (ref 0–0.9)
EOSINOPHIL NFR BLD AUTO: 1.9 %
ERYTHROCYTE [DISTWIDTH] IN BLOOD BY AUTOMATED COUNT: 14 % (ref 11.5–17)
FERRITIN SERPL-MCNC: 484.4 NG/ML (ref 4.63–204)
HCT VFR BLD AUTO: 39.4 % (ref 37–47)
HGB BLD-MCNC: 12.3 G/DL (ref 12–16)
HGB, POC: 12.1 G/DL (ref 12–16)
IMM GRANULOCYTES # BLD AUTO: 0.01 X10(3)/MCL (ref 0–0.04)
IMM GRANULOCYTES NFR BLD AUTO: 0.2 %
IRON SATN MFR SERPL: 25 % (ref 20–50)
IRON SERPL-MCNC: 42 UG/DL (ref 50–170)
LYMPHOCYTES # BLD AUTO: 1.19 X10(3)/MCL (ref 0.6–4.6)
LYMPHOCYTES NFR BLD AUTO: 23.2 %
MCH RBC QN AUTO: 27.6 PG (ref 27–31)
MCHC RBC AUTO-ENTMCNC: 31.2 G/DL (ref 33–36)
MCV RBC AUTO: 88.3 FL (ref 80–94)
MONOCYTES # BLD AUTO: 0.4 X10(3)/MCL (ref 0.1–1.3)
MONOCYTES NFR BLD AUTO: 7.8 %
NEUTROPHILS # BLD AUTO: 3.41 X10(3)/MCL (ref 2.1–9.2)
NEUTROPHILS NFR BLD AUTO: 66.5 %
PLATELET # BLD AUTO: 229 X10(3)/MCL (ref 130–400)
PMV BLD AUTO: 11.4 FL (ref 7.4–10.4)
RBC # BLD AUTO: 4.46 X10(6)/MCL (ref 4.2–5.4)
TIBC SERPL-MCNC: 123 UG/DL (ref 70–310)
TIBC SERPL-MCNC: 165 UG/DL (ref 250–450)
TRANSFERRIN SERPL-MCNC: 128 MG/DL (ref 173–360)
WBC # SPEC AUTO: 5.13 X10(3)/MCL (ref 4.5–11.5)

## 2024-02-28 PROCEDURE — 85025 COMPLETE CBC W/AUTO DIFF WBC: CPT | Performed by: INTERNAL MEDICINE

## 2024-02-28 PROCEDURE — 82728 ASSAY OF FERRITIN: CPT | Performed by: INTERNAL MEDICINE

## 2024-02-28 PROCEDURE — 83540 ASSAY OF IRON: CPT

## 2024-02-28 PROCEDURE — 36415 COLL VENOUS BLD VENIPUNCTURE: CPT

## 2024-05-04 ENCOUNTER — HOSPITAL ENCOUNTER (EMERGENCY)
Facility: HOSPITAL | Age: 74
Discharge: HOME | End: 2024-05-04
Attending: EMERGENCY MEDICINE
Payer: MEDICARE

## 2024-05-04 VITALS
RESPIRATION RATE: 18 BRPM | SYSTOLIC BLOOD PRESSURE: 162 MMHG | HEART RATE: 70 BPM | HEIGHT: 62 IN | OXYGEN SATURATION: 100 % | BODY MASS INDEX: 29.82 KG/M2 | DIASTOLIC BLOOD PRESSURE: 76 MMHG | TEMPERATURE: 98 F

## 2024-05-04 DIAGNOSIS — Z51.89 VISIT FOR WOUND CHECK: Primary | ICD-10-CM

## 2024-05-04 DIAGNOSIS — Z48.02 ENCOUNTER FOR STAPLE REMOVAL: ICD-10-CM

## 2024-05-04 PROCEDURE — 25000003 PHARM REV CODE 250: Performed by: EMERGENCY MEDICINE

## 2024-05-04 PROCEDURE — 15853 REMOVAL SUTR/STAPL XREQ ANES: CPT

## 2024-05-04 PROCEDURE — 99283 EMERGENCY DEPT VISIT LOW MDM: CPT

## 2024-05-04 RX ORDER — LIDOCAINE AND PRILOCAINE 25; 25 MG/G; MG/G
CREAM TOPICAL
Status: DISCONTINUED | OUTPATIENT
Start: 2024-05-04 | End: 2024-05-04

## 2024-05-04 RX ORDER — LIDOCAINE 40 MG/G
CREAM TOPICAL
Status: DISCONTINUED | OUTPATIENT
Start: 2024-05-04 | End: 2024-05-04

## 2024-05-04 RX ORDER — LIDOCAINE 40 MG/G
CREAM TOPICAL ONCE
Status: DISCONTINUED | OUTPATIENT
Start: 2024-05-04 | End: 2024-05-04

## 2024-05-04 RX ORDER — LIDOCAINE 40 MG/G
CREAM TOPICAL ONCE
Status: COMPLETED | OUTPATIENT
Start: 2024-05-04 | End: 2024-05-04

## 2024-05-04 RX ADMIN — BACITRACIN ZINC, NEOMYCIN, POLYMYXIN B 3 EACH: 400; 3.5; 5 OINTMENT TOPICAL at 02:05

## 2024-05-04 RX ADMIN — LIDOCAINE: 40 CREAM TOPICAL at 01:05

## 2024-05-04 NOTE — DISCHARGE INSTRUCTIONS
Wash with antibacterial soap and water.  Apply Bactroban ointment.  Follow-up with your physician in 3-4 days for recheck.  Return to the ER for worsening symptoms or condition

## 2024-05-04 NOTE — ED PROVIDER NOTES
Encounter Date: 5/4/2024       History     Chief Complaint   Patient presents with    Wound Check     Pt had a R AKA done on 3/29/24 and is here for removal of the remaining staples.  Pt states the home health nurse attempted to remove them at home but she could not tolerate the pain     Patient presents to ER today to have staples removed from her right AKA.  Patient states she is 8 staples that need to be removed.  Home health has been taking the staples out.  Patient states these are too painful.  Patient states felt health nurse tried applying a numbing cream and this did not help.    The history is provided by the patient.     Review of patient's allergies indicates:   Allergen Reactions    Fish containing products Anaphylaxis    Shellfish containing products Anaphylaxis    Statins-hmg-coa reductase inhibitors Hives and Swelling    Phenergan [promethazine] Hives    Wool Blisters    Iodine and iodide containing products Rash     Iodine and seafood allergy    Onion Nausea And Vomiting and Other (See Comments)     High Blood pressure and Headaches    Opioids - morphine analogues Nausea And Vomiting    Tetanus vaccines and toxoid Nausea And Vomiting    Zofran [ondansetron hcl] Nausea And Vomiting     Past Medical History:   Diagnosis Date    Chronic kidney disease, unspecified     Pt states stage 4    Diabetes     Diverticulitis     Essential (primary) hypertension     Heart attack     Hypercholesteremia     Hypothyroidism     Mixed hyperlipidemia     Nonrheumatic mitral (valve) insufficiency     Obesity, unspecified     Paroxysmal atrial fibrillation     Unspecified osteoarthritis, unspecified site      Past Surgical History:   Procedure Laterality Date    APPENDECTOMY      CORONARY ARTERY BYPASS GRAFT      DEBRIDEMENT Right 12/21/2021    Muscle and/or fascia    HYSTERECTOMY      INSERTION OF PACEMAKER      KNEE SURGERY      LEFT HEART CATHETERIZATION Left 6/28/2023    Procedure: Left heart cath;  Surgeon: Khris TANG  MD Anastasia;  Location: [facility] CATH LAB;  Service: Cardiology;  Laterality: Left;    MITRAL VALVE SURGERY      SHOULDER SURGERY      Stent in heart       STENT, DRUG ELUTING, SINGLE VESSEL, CORONARY  6/28/2023    Procedure: Stent, Drug Eluting, Single Vessel, Coronary;  Surgeon: Khris Oconnor MD;  Location: [facility] CATH LAB;  Service: Cardiology;;    TONSILLECTOMY       Family History   Problem Relation Name Age of Onset    Heart attack Mother      Heart attacks under age 50 Father      Cancer Sister       Social History     Tobacco Use    Smoking status: Never    Smokeless tobacco: Never   Substance Use Topics    Alcohol use: Not Currently    Drug use: Never     Review of Systems   Skin:         Eight staples noted right AKA   All other systems reviewed and are negative.      Physical Exam     Initial Vitals [05/04/24 1309]   BP Pulse Resp Temp SpO2   (!) 162/76 70 18 98.2 °F (36.8 °C) 100 %      MAP       --         Physical Exam    Nursing note and vitals reviewed.  Constitutional: She appears well-developed and well-nourished.   HENT:   Head: Normocephalic and atraumatic.   Nose: Nose normal.   Mouth/Throat: Oropharynx is clear and moist.   Eyes: Conjunctivae and EOM are normal. Pupils are equal, round, and reactive to light.   Neck: Neck supple.   Normal range of motion.  Cardiovascular:  Normal rate, regular rhythm and intact distal pulses.           Musculoskeletal:         General: Normal range of motion.      Cervical back: Normal range of motion and neck supple.     Neurological: She is alert and oriented to person, place, and time. She has normal strength.   Skin: Skin is warm and dry. Capillary refill takes less than 2 seconds.   Right AKA noted.  Surgical incision is well healed and approximated. 7 staples removed.   Psychiatric: She has a normal mood and affect. Her behavior is normal. Judgment and thought content normal.         ED Course   Procedures  Labs Reviewed - No data to display       Imaging  Results    None          Medications   neomycin-bacitracnZn-polymyxnB packet (has no administration in time range)   LIDOcaine 4 % cream ( Topical (Top) Given 5/4/24 1323)     Medical Decision Making  Staple removal, wound infection    Risk  OTC drugs.  Risk Details: 7 staples removed.  Wound edges approximated.  Incision cleaned with sterile saline.  Dressed with Bactroban and dressing.  Patient tolerated well.  Patient to continue her current medications as prescribed.  Follow up with her primary care doctor next week.  Patient verbalized understanding agrees to treatment plan.  Patient to return to ER for worsening symptoms or condition                                      Clinical Impression:  Final diagnoses:  [Z51.89] Visit for wound check (Primary)  [Z48.02] Encounter for staple removal          ED Disposition Condition    Discharge Stable          ED Prescriptions    None       Follow-up Information       Follow up With Specialties Details Why Contact Info    Marla Taveras MD Family Medicine  3-4 days [address]  [address]  [facility] [address]  [phone]               Yolanda Car, PA  05/04/24 7266

## 2024-05-09 ENCOUNTER — HOSPITAL ENCOUNTER (EMERGENCY)
Facility: HOSPITAL | Age: 74
Discharge: HOME | End: 2024-05-09
Attending: FAMILY MEDICINE
Payer: MEDICARE

## 2024-05-09 VITALS
HEIGHT: 62 IN | DIASTOLIC BLOOD PRESSURE: 80 MMHG | TEMPERATURE: 98 F | OXYGEN SATURATION: 97 % | RESPIRATION RATE: 16 BRPM | BODY MASS INDEX: 27.6 KG/M2 | SYSTOLIC BLOOD PRESSURE: 150 MMHG | HEART RATE: 70 BPM | WEIGHT: 150 LBS

## 2024-05-09 DIAGNOSIS — Z48.02 REMOVAL OF STAPLE: Primary | ICD-10-CM

## 2024-05-09 PROCEDURE — 99283 EMERGENCY DEPT VISIT LOW MDM: CPT

## 2024-05-09 RX ORDER — CEPHALEXIN 500 MG/1
500 CAPSULE ORAL 4 TIMES DAILY
Qty: 16 CAPSULE | Refills: 0 | Status: SHIPPED | OUTPATIENT
Start: 2024-05-09 | End: 2024-05-09

## 2024-05-09 RX ORDER — CEPHALEXIN 500 MG/1
500 CAPSULE ORAL 4 TIMES DAILY
Qty: 16 CAPSULE | Refills: 0 | Status: SHIPPED | OUTPATIENT
Start: 2024-05-09 | End: 2024-05-13

## 2024-05-09 NOTE — ED PROVIDER NOTES
Encounter Date: 5/9/2024       History     Chief Complaint   Patient presents with    Suture / Staple Removal     Was seen Saturday for suture removal and instructed to come back today for removal 2 more.     Rahel, 74-year-old female status post below-the-knee amputation on the right.  Surgery was in the end of March and the patient is just now having staples removed.  This is the 2nd visit to staples removed.  Previous visit 8 staples were removed patient states that there are 2 remaining.  Site is healing well no drainage small amount of redness.  Patient denies any alleviating or aggravating symptoms.  Denies pain at present.      Allergies: include fish shellfish statins Phenergan cool iodine onion opiates tetanus and Zofran    Past medical history includes AFib diabetes obesity diverticulitis cholesterol hypertension thyroid MI mitral valve chronic kidney disease    Past surgical history debridement mitral valve surgery coronary artery bypass pacemaker stents hysterectomy appendectomy tonsillectomy shoulder surgery knee surgery left heart catheterization stent.    Social history patient denies smoker and alcohol          The history is provided by the patient. No  was used.     Review of patient's allergies indicates:   Allergen Reactions    Fish containing products Anaphylaxis    Shellfish containing products Anaphylaxis    Statins-hmg-coa reductase inhibitors Hives and Swelling    Phenergan [promethazine] Hives    Wool Blisters    Iodine and iodide containing products Rash     Iodine and seafood allergy    Onion Nausea And Vomiting and Other (See Comments)     High Blood pressure and Headaches    Opioids - morphine analogues Nausea And Vomiting    Tetanus vaccines and toxoid Nausea And Vomiting    Zofran [ondansetron hcl] Nausea And Vomiting     Past Medical History:   Diagnosis Date    Chronic kidney disease, unspecified     Pt states stage 4    Diabetes     Diverticulitis     Essential  (primary) hypertension     Heart attack     Hypercholesteremia     Hypothyroidism     Mixed hyperlipidemia     Nonrheumatic mitral (valve) insufficiency     Obesity, unspecified     Paroxysmal atrial fibrillation     Unspecified osteoarthritis, unspecified site      Past Surgical History:   Procedure Laterality Date    APPENDECTOMY      CORONARY ARTERY BYPASS GRAFT      DEBRIDEMENT Right 12/21/2021    Muscle and/or fascia    HYSTERECTOMY      INSERTION OF PACEMAKER      KNEE SURGERY      LEFT HEART CATHETERIZATION Left 6/28/2023    Procedure: Left heart cath;  Surgeon: Khris Oconnor MD;  Location: [facility] CATH LAB;  Service: Cardiology;  Laterality: Left;    MITRAL VALVE SURGERY      SHOULDER SURGERY      Stent in heart       STENT, DRUG ELUTING, SINGLE VESSEL, CORONARY  6/28/2023    Procedure: Stent, Drug Eluting, Single Vessel, Coronary;  Surgeon: Khris Oconnor MD;  Location: [facility] CATH LAB;  Service: Cardiology;;    TONSILLECTOMY       Family History   Problem Relation Name Age of Onset    Heart attack Mother      Heart attacks under age 50 Father      Cancer Sister       Social History     Tobacco Use    Smoking status: Never    Smokeless tobacco: Never   Substance Use Topics    Alcohol use: Not Currently    Drug use: Never     Review of Systems   Constitutional: Negative.    HENT: Negative.     Eyes: Negative.    Respiratory: Negative.     Cardiovascular: Negative.    Gastrointestinal: Negative.    Endocrine: Negative.    Genitourinary: Negative.    Musculoskeletal:  Positive for gait problem.   Skin:  Positive for wound.   Allergic/Immunologic: Negative.    Hematological: Negative.    Psychiatric/Behavioral: Negative.     All other systems reviewed and are negative.      Physical Exam     Initial Vitals [05/09/24 1432]   BP Pulse Resp Temp SpO2   (!) 150/80 70 16 98 °F (36.7 °C) 97 %      MAP       --         Physical Exam    Nursing note and vitals reviewed.  Constitutional: She appears well-developed and  well-nourished.   HENT:   Head: Normocephalic and atraumatic.   Right Ear: External ear normal.   Left Ear: External ear normal.   Nose: Nose normal.   Mouth/Throat: Oropharynx is clear and moist.   Eyes: Conjunctivae and EOM are normal. Pupils are equal, round, and reactive to light.   Neck: Neck supple.   Normal range of motion.  Cardiovascular:  Normal rate, regular rhythm, normal heart sounds and intact distal pulses.           Pulmonary/Chest: Breath sounds normal.   Abdominal: Abdomen is soft. Bowel sounds are normal.   Musculoskeletal:         General: Tenderness and edema present.      Cervical back: Normal range of motion and neck supple.     Neurological: She is alert and oriented to person, place, and time. She has normal reflexes. GCS score is 15. GCS eye subscore is 4. GCS verbal subscore is 5. GCS motor subscore is 6.   Skin: Skin is warm and dry. Capillary refill takes less than 2 seconds. There is erythema.   Psychiatric: She has a normal mood and affect. Her behavior is normal. Judgment and thought content normal.         ED Course   Suture Removal    Date/Time: 5/9/2024 6:44 PM  Location procedure was performed: [facility] EMERGENCY DEPARTMENT    Performed by: Shelly Thomas NP  Authorized by: Evert Mathew MD  Body area: lower extremity  Location details: right knee  Description of findings: Healing right knee below amputation   Wound Appearance: clean, warm, normal color, tender and no drainage  Staples Removed: 2  Post-removal: bandaid applied  Complications: No  Specimens: No  Implants: No  Patient tolerance: Patient tolerated the procedure well with no immediate complications        Labs Reviewed - No data to display       Imaging Results    None          Medications - No data to display  Medical Decision Making  Medical decision-making      Differential diagnosis:  Wound closure, staple removal,     Risk  Prescription drug management.                                      Clinical  Impression:  Final diagnoses:  [Z48.02] Removal of staple (Primary)          ED Disposition Condition    Discharge Stable          ED Prescriptions       Medication Sig Dispense Start Date End Date Auth. Provider    cephALEXin (KEFLEX) 500 MG capsule  (Status: Discontinued) Take 1 capsule (500 mg total) by mouth 4 (four) times daily. for 4 days 16 capsule 5/9/2024 5/9/2024 Shelly Thomas NP    cephALEXin (KEFLEX) 500 MG capsule Take 1 capsule (500 mg total) by mouth 4 (four) times daily. for 4 days 16 capsule 5/9/2024 5/13/2024 Shelly Thomas NP          Follow-up Information    None          Shelly Thomas NP  05/09/24 9296

## 2024-05-24 ENCOUNTER — LAB VISIT (OUTPATIENT)
Dept: LAB | Facility: HOSPITAL | Age: 74
End: 2024-05-24
Attending: INTERNAL MEDICINE
Payer: MEDICARE

## 2024-05-24 DIAGNOSIS — E21.3 HYPERPARATHYROIDISM, UNSPECIFIED: ICD-10-CM

## 2024-05-24 DIAGNOSIS — N18.32 CHRONIC KIDNEY DISEASE (CKD) STAGE G3B/A1, MODERATELY DECREASED GLOMERULAR FILTRATION RATE (GFR) BETWEEN 30-44 ML/MIN/1.73 SQUARE METER AND ALBUMINURIA CREATININE RATIO LESS THAN 30 MG/G: ICD-10-CM

## 2024-05-24 DIAGNOSIS — C22.0 HCC (HEPATOCELLULAR CARCINOMA): ICD-10-CM

## 2024-05-24 DIAGNOSIS — E55.9 AVITAMINOSIS D: ICD-10-CM

## 2024-05-24 DIAGNOSIS — E87.5 HYPERPOTASSEMIA: ICD-10-CM

## 2024-05-24 DIAGNOSIS — D63.1 ANEMIA OF CHRONIC RENAL FAILURE, UNSPECIFIED CKD STAGE: Primary | ICD-10-CM

## 2024-05-24 DIAGNOSIS — N18.9 ANEMIA OF CHRONIC RENAL FAILURE, UNSPECIFIED CKD STAGE: Primary | ICD-10-CM

## 2024-05-24 LAB
25(OH)D3+25(OH)D2 SERPL-MCNC: 32 NG/ML (ref 30–80)
ALBUMIN SERPL-MCNC: 3.5 G/DL (ref 3.4–4.8)
ALBUMIN/GLOB SERPL: 1.1 RATIO (ref 1.1–2)
ALP SERPL-CCNC: 46 UNIT/L (ref 40–150)
ALT SERPL-CCNC: 9 UNIT/L (ref 0–55)
ANION GAP SERPL CALC-SCNC: 8 MEQ/L
AST SERPL-CCNC: 21 UNIT/L (ref 5–34)
BASOPHILS # BLD AUTO: 0.03 X10(3)/MCL
BASOPHILS NFR BLD AUTO: 1 %
BILIRUB SERPL-MCNC: 0.8 MG/DL
BUN SERPL-MCNC: 33.1 MG/DL (ref 9.8–20.1)
CALCIUM SERPL-MCNC: 10.2 MG/DL (ref 8.4–10.2)
CHLORIDE SERPL-SCNC: 109 MMOL/L (ref 98–107)
CO2 SERPL-SCNC: 28 MMOL/L (ref 23–31)
CREAT SERPL-MCNC: 1.15 MG/DL (ref 0.55–1.02)
CREAT/UREA NIT SERPL: 29
EOSINOPHIL # BLD AUTO: 0.13 X10(3)/MCL (ref 0–0.9)
EOSINOPHIL NFR BLD AUTO: 4.4 %
ERYTHROCYTE [DISTWIDTH] IN BLOOD BY AUTOMATED COUNT: 13.2 % (ref 11.5–17)
FERRITIN SERPL-MCNC: 172.1 NG/ML (ref 4.63–204)
GFR SERPLBLD CREATININE-BSD FMLA CKD-EPI: 50 ML/MIN/1.73/M2
GLOBULIN SER-MCNC: 3.3 GM/DL (ref 2.4–3.5)
GLUCOSE SERPL-MCNC: 103 MG/DL (ref 82–115)
HCT VFR BLD AUTO: 35.9 % (ref 37–47)
HGB BLD-MCNC: 11.3 G/DL (ref 12–16)
IMM GRANULOCYTES # BLD AUTO: 0.01 X10(3)/MCL (ref 0–0.04)
IMM GRANULOCYTES NFR BLD AUTO: 0.3 %
IRON SATN MFR SERPL: 16 % (ref 20–50)
IRON SERPL-MCNC: 52 UG/DL (ref 50–170)
LYMPHOCYTES # BLD AUTO: 1.01 X10(3)/MCL (ref 0.6–4.6)
LYMPHOCYTES NFR BLD AUTO: 34.1 %
MAGNESIUM SERPL-MCNC: 2.2 MG/DL (ref 1.6–2.6)
MCH RBC QN AUTO: 30.3 PG (ref 27–31)
MCHC RBC AUTO-ENTMCNC: 31.5 G/DL (ref 33–36)
MCV RBC AUTO: 96.2 FL (ref 80–94)
MONOCYTES # BLD AUTO: 0.29 X10(3)/MCL (ref 0.1–1.3)
MONOCYTES NFR BLD AUTO: 9.8 %
NEUTROPHILS # BLD AUTO: 1.49 X10(3)/MCL (ref 2.1–9.2)
NEUTROPHILS NFR BLD AUTO: 50.4 %
PHOSPHATE SERPL-MCNC: 3.2 MG/DL (ref 2.3–4.7)
PLATELET # BLD AUTO: 139 X10(3)/MCL (ref 130–400)
PMV BLD AUTO: 10.9 FL (ref 7.4–10.4)
POTASSIUM SERPL-SCNC: 3.8 MMOL/L (ref 3.5–5.1)
PROT SERPL-MCNC: 6.8 GM/DL (ref 5.8–7.6)
PTH-INTACT SERPL-MCNC: 67.7 PG/ML (ref 8.7–77)
RBC # BLD AUTO: 3.73 X10(6)/MCL (ref 4.2–5.4)
SODIUM SERPL-SCNC: 145 MMOL/L (ref 136–145)
TIBC SERPL-MCNC: 274 UG/DL (ref 70–310)
TIBC SERPL-MCNC: 326 UG/DL (ref 250–450)
TRANSFERRIN SERPL-MCNC: 303 MG/DL (ref 173–360)
WBC # SPEC AUTO: 2.96 X10(3)/MCL (ref 4.5–11.5)

## 2024-05-24 PROCEDURE — 85025 COMPLETE CBC W/AUTO DIFF WBC: CPT

## 2024-05-24 PROCEDURE — 82306 VITAMIN D 25 HYDROXY: CPT

## 2024-05-24 PROCEDURE — 83540 ASSAY OF IRON: CPT

## 2024-05-24 PROCEDURE — 84100 ASSAY OF PHOSPHORUS: CPT

## 2024-05-24 PROCEDURE — 83970 ASSAY OF PARATHORMONE: CPT

## 2024-05-24 PROCEDURE — 83735 ASSAY OF MAGNESIUM: CPT

## 2024-05-24 PROCEDURE — 80053 COMPREHEN METABOLIC PANEL: CPT

## 2024-05-24 PROCEDURE — 36415 COLL VENOUS BLD VENIPUNCTURE: CPT

## 2024-05-24 PROCEDURE — 82728 ASSAY OF FERRITIN: CPT

## 2024-06-04 RX ORDER — ACETAMINOPHEN 500 MG
500 TABLET ORAL
Status: CANCELLED
Start: 2024-06-05

## 2024-06-04 RX ORDER — SODIUM CHLORIDE 9 MG/ML
INJECTION, SOLUTION INTRAVENOUS CONTINUOUS
Status: CANCELLED | OUTPATIENT
Start: 2024-06-05

## 2024-06-04 RX ORDER — DIPHENHYDRAMINE HYDROCHLORIDE 50 MG/ML
25 INJECTION INTRAMUSCULAR; INTRAVENOUS
Status: CANCELLED
Start: 2024-06-05

## 2024-06-04 RX ORDER — DIPHENHYDRAMINE HYDROCHLORIDE 50 MG/ML
50 INJECTION INTRAMUSCULAR; INTRAVENOUS ONCE AS NEEDED
Status: CANCELLED | OUTPATIENT
Start: 2024-06-05

## 2024-06-04 RX ORDER — EPINEPHRINE 0.3 MG/.3ML
0.3 INJECTION SUBCUTANEOUS ONCE AS NEEDED
Status: CANCELLED | OUTPATIENT
Start: 2024-06-05

## 2024-06-04 RX ORDER — SODIUM CHLORIDE 0.9 % (FLUSH) 0.9 %
10 SYRINGE (ML) INJECTION
Status: CANCELLED | OUTPATIENT
Start: 2024-06-05

## 2024-06-10 ENCOUNTER — INFUSION (OUTPATIENT)
Dept: INFUSION THERAPY | Facility: HOSPITAL | Age: 74
End: 2024-06-10
Attending: INTERNAL MEDICINE
Payer: MEDICARE

## 2024-06-10 VITALS
HEIGHT: 62 IN | WEIGHT: 149.94 LBS | HEART RATE: 72 BPM | TEMPERATURE: 98 F | DIASTOLIC BLOOD PRESSURE: 76 MMHG | BODY MASS INDEX: 27.59 KG/M2 | OXYGEN SATURATION: 96 % | RESPIRATION RATE: 20 BRPM | SYSTOLIC BLOOD PRESSURE: 108 MMHG

## 2024-06-10 DIAGNOSIS — N18.4 ANEMIA OF CHRONIC KIDNEY FAILURE, STAGE 4 (SEVERE): ICD-10-CM

## 2024-06-10 DIAGNOSIS — N18.4 CHRONIC KIDNEY DISEASE, STAGE 4 (SEVERE): ICD-10-CM

## 2024-06-10 DIAGNOSIS — D50.9 IRON DEFICIENCY ANEMIA, UNSPECIFIED IRON DEFICIENCY ANEMIA TYPE: ICD-10-CM

## 2024-06-10 DIAGNOSIS — D63.1 ANEMIA IN STAGE 3B CHRONIC KIDNEY DISEASE: Primary | ICD-10-CM

## 2024-06-10 DIAGNOSIS — N18.32 ANEMIA IN STAGE 3B CHRONIC KIDNEY DISEASE: Primary | ICD-10-CM

## 2024-06-10 DIAGNOSIS — D63.1 ANEMIA OF CHRONIC KIDNEY FAILURE, STAGE 4 (SEVERE): ICD-10-CM

## 2024-06-10 LAB
ANION GAP SERPL CALC-SCNC: 11 MEQ/L
BUN SERPL-MCNC: 53.7 MG/DL (ref 9.8–20.1)
CALCIUM SERPL-MCNC: 10.6 MG/DL (ref 8.4–10.2)
CHLORIDE SERPL-SCNC: 98 MMOL/L (ref 98–107)
CO2 SERPL-SCNC: 33 MMOL/L (ref 23–31)
CREAT SERPL-MCNC: 1.66 MG/DL (ref 0.55–1.02)
CREAT/UREA NIT SERPL: 32
GFR SERPLBLD CREATININE-BSD FMLA CKD-EPI: 32 ML/MIN/1.73/M2
GLUCOSE SERPL-MCNC: 89 MG/DL (ref 82–115)
POTASSIUM SERPL-SCNC: 3.5 MMOL/L (ref 3.5–5.1)
SODIUM SERPL-SCNC: 142 MMOL/L (ref 136–145)

## 2024-06-10 PROCEDURE — 80048 BASIC METABOLIC PNL TOTAL CA: CPT

## 2024-06-10 PROCEDURE — 96365 THER/PROPH/DIAG IV INF INIT: CPT

## 2024-06-10 PROCEDURE — 36415 COLL VENOUS BLD VENIPUNCTURE: CPT

## 2024-06-10 PROCEDURE — A4216 STERILE WATER/SALINE, 10 ML: HCPCS | Performed by: INTERNAL MEDICINE

## 2024-06-10 PROCEDURE — 25000003 PHARM REV CODE 250: Performed by: INTERNAL MEDICINE

## 2024-06-10 PROCEDURE — 63600175 PHARM REV CODE 636 W HCPCS: Mod: JZ,TB | Performed by: INTERNAL MEDICINE

## 2024-06-10 RX ORDER — DIPHENHYDRAMINE HYDROCHLORIDE 50 MG/ML
25 INJECTION INTRAMUSCULAR; INTRAVENOUS
Status: CANCELLED
Start: 2024-06-17

## 2024-06-10 RX ORDER — ACETAMINOPHEN 500 MG
500 TABLET ORAL
Status: DISCONTINUED | OUTPATIENT
Start: 2024-06-10 | End: 2024-06-10 | Stop reason: HOSPADM

## 2024-06-10 RX ORDER — EPINEPHRINE 0.3 MG/.3ML
0.3 INJECTION SUBCUTANEOUS ONCE AS NEEDED
OUTPATIENT
Start: 2024-06-17

## 2024-06-10 RX ORDER — SODIUM CHLORIDE 9 MG/ML
INJECTION, SOLUTION INTRAVENOUS CONTINUOUS
Status: CANCELLED | OUTPATIENT
Start: 2024-06-17

## 2024-06-10 RX ORDER — DIPHENHYDRAMINE HYDROCHLORIDE 50 MG/ML
25 INJECTION INTRAMUSCULAR; INTRAVENOUS
Status: DISCONTINUED | OUTPATIENT
Start: 2024-06-10 | End: 2024-06-10 | Stop reason: HOSPADM

## 2024-06-10 RX ORDER — DIPHENHYDRAMINE HYDROCHLORIDE 50 MG/ML
50 INJECTION INTRAMUSCULAR; INTRAVENOUS ONCE AS NEEDED
Status: DISCONTINUED | OUTPATIENT
Start: 2024-06-10 | End: 2024-06-10 | Stop reason: HOSPADM

## 2024-06-10 RX ORDER — SODIUM CHLORIDE 0.9 % (FLUSH) 0.9 %
10 SYRINGE (ML) INJECTION
Status: CANCELLED | OUTPATIENT
Start: 2024-06-17

## 2024-06-10 RX ORDER — SODIUM CHLORIDE 0.9 % (FLUSH) 0.9 %
10 SYRINGE (ML) INJECTION
Status: DISCONTINUED | OUTPATIENT
Start: 2024-06-10 | End: 2024-06-10 | Stop reason: HOSPADM

## 2024-06-10 RX ORDER — EPINEPHRINE 0.3 MG/.3ML
0.3 INJECTION SUBCUTANEOUS ONCE AS NEEDED
Status: DISCONTINUED | OUTPATIENT
Start: 2024-06-10 | End: 2024-06-10 | Stop reason: HOSPADM

## 2024-06-10 RX ORDER — DIPHENHYDRAMINE HYDROCHLORIDE 50 MG/ML
50 INJECTION INTRAMUSCULAR; INTRAVENOUS ONCE AS NEEDED
OUTPATIENT
Start: 2024-06-17

## 2024-06-10 RX ORDER — SODIUM CHLORIDE 9 MG/ML
INJECTION, SOLUTION INTRAVENOUS CONTINUOUS
Status: DISCONTINUED | OUTPATIENT
Start: 2024-06-10 | End: 2024-06-10 | Stop reason: HOSPADM

## 2024-06-10 RX ORDER — ACETAMINOPHEN 500 MG
500 TABLET ORAL
Status: CANCELLED
Start: 2024-06-17

## 2024-06-10 RX ADMIN — Medication 20 ML: at 02:06

## 2024-06-10 RX ADMIN — FERRIC CARBOXYMALTOSE INJECTION 750 MG: 50 INJECTION, SOLUTION INTRAVENOUS at 01:06

## 2024-06-17 ENCOUNTER — INFUSION (OUTPATIENT)
Dept: INFUSION THERAPY | Facility: HOSPITAL | Age: 74
End: 2024-06-17
Attending: INTERNAL MEDICINE
Payer: MEDICARE

## 2024-06-17 VITALS
OXYGEN SATURATION: 96 % | BODY MASS INDEX: 27.59 KG/M2 | HEART RATE: 70 BPM | RESPIRATION RATE: 20 BRPM | WEIGHT: 149.94 LBS | TEMPERATURE: 98 F | DIASTOLIC BLOOD PRESSURE: 67 MMHG | SYSTOLIC BLOOD PRESSURE: 127 MMHG | HEIGHT: 62 IN

## 2024-06-17 DIAGNOSIS — N18.4 ANEMIA OF CHRONIC KIDNEY FAILURE, STAGE 4 (SEVERE): ICD-10-CM

## 2024-06-17 DIAGNOSIS — D63.1 ANEMIA IN STAGE 3B CHRONIC KIDNEY DISEASE: Primary | ICD-10-CM

## 2024-06-17 DIAGNOSIS — D50.9 IRON DEFICIENCY ANEMIA, UNSPECIFIED IRON DEFICIENCY ANEMIA TYPE: ICD-10-CM

## 2024-06-17 DIAGNOSIS — N18.4 CHRONIC KIDNEY DISEASE, STAGE 4 (SEVERE): ICD-10-CM

## 2024-06-17 DIAGNOSIS — D63.1 ANEMIA OF CHRONIC KIDNEY FAILURE, STAGE 4 (SEVERE): ICD-10-CM

## 2024-06-17 DIAGNOSIS — N18.32 ANEMIA IN STAGE 3B CHRONIC KIDNEY DISEASE: Primary | ICD-10-CM

## 2024-06-17 LAB
ANION GAP SERPL CALC-SCNC: 7 MEQ/L
BUN SERPL-MCNC: 42.5 MG/DL (ref 9.8–20.1)
CALCIUM SERPL-MCNC: 10.2 MG/DL (ref 8.4–10.2)
CHLORIDE SERPL-SCNC: 105 MMOL/L (ref 98–107)
CO2 SERPL-SCNC: 31 MMOL/L (ref 23–31)
CREAT SERPL-MCNC: 1.47 MG/DL (ref 0.55–1.02)
CREAT/UREA NIT SERPL: 29
GFR SERPLBLD CREATININE-BSD FMLA CKD-EPI: 37 ML/MIN/1.73/M2
GLUCOSE SERPL-MCNC: 135 MG/DL (ref 82–115)
POTASSIUM SERPL-SCNC: 4.1 MMOL/L (ref 3.5–5.1)
SODIUM SERPL-SCNC: 143 MMOL/L (ref 136–145)

## 2024-06-17 PROCEDURE — 80048 BASIC METABOLIC PNL TOTAL CA: CPT | Performed by: INTERNAL MEDICINE

## 2024-06-17 PROCEDURE — 25000003 PHARM REV CODE 250: Performed by: INTERNAL MEDICINE

## 2024-06-17 PROCEDURE — 96365 THER/PROPH/DIAG IV INF INIT: CPT

## 2024-06-17 PROCEDURE — 63600175 PHARM REV CODE 636 W HCPCS: Mod: JZ,TB | Performed by: INTERNAL MEDICINE

## 2024-06-17 PROCEDURE — 96375 TX/PRO/DX INJ NEW DRUG ADDON: CPT

## 2024-06-17 PROCEDURE — A4216 STERILE WATER/SALINE, 10 ML: HCPCS | Performed by: INTERNAL MEDICINE

## 2024-06-17 PROCEDURE — 36415 COLL VENOUS BLD VENIPUNCTURE: CPT | Performed by: INTERNAL MEDICINE

## 2024-06-17 RX ORDER — ACETAMINOPHEN 500 MG
500 TABLET ORAL
Status: DISCONTINUED | OUTPATIENT
Start: 2024-06-17 | End: 2024-06-17 | Stop reason: HOSPADM

## 2024-06-17 RX ORDER — DIPHENHYDRAMINE HYDROCHLORIDE 50 MG/ML
50 INJECTION INTRAMUSCULAR; INTRAVENOUS ONCE AS NEEDED
OUTPATIENT
Start: 2024-06-17

## 2024-06-17 RX ORDER — DIPHENHYDRAMINE HYDROCHLORIDE 50 MG/ML
25 INJECTION INTRAMUSCULAR; INTRAVENOUS
Status: COMPLETED | OUTPATIENT
Start: 2024-06-17 | End: 2024-06-17

## 2024-06-17 RX ORDER — SODIUM CHLORIDE 0.9 % (FLUSH) 0.9 %
10 SYRINGE (ML) INJECTION
Status: CANCELLED | OUTPATIENT
Start: 2024-06-17

## 2024-06-17 RX ORDER — ACETAMINOPHEN 500 MG
500 TABLET ORAL
Start: 2024-06-17

## 2024-06-17 RX ORDER — DIPHENHYDRAMINE HYDROCHLORIDE 50 MG/ML
25 INJECTION INTRAMUSCULAR; INTRAVENOUS
Status: CANCELLED
Start: 2024-06-17

## 2024-06-17 RX ORDER — SODIUM CHLORIDE 0.9 % (FLUSH) 0.9 %
10 SYRINGE (ML) INJECTION
Status: DISCONTINUED | OUTPATIENT
Start: 2024-06-17 | End: 2024-06-17 | Stop reason: HOSPADM

## 2024-06-17 RX ORDER — SODIUM CHLORIDE 9 MG/ML
INJECTION, SOLUTION INTRAVENOUS CONTINUOUS
Status: CANCELLED | OUTPATIENT
Start: 2024-06-17

## 2024-06-17 RX ORDER — EPINEPHRINE 0.3 MG/.3ML
0.3 INJECTION SUBCUTANEOUS ONCE AS NEEDED
OUTPATIENT
Start: 2024-06-17

## 2024-06-17 RX ORDER — SODIUM CHLORIDE 9 MG/ML
INJECTION, SOLUTION INTRAVENOUS CONTINUOUS
Status: DISCONTINUED | OUTPATIENT
Start: 2024-06-17 | End: 2024-06-17 | Stop reason: HOSPADM

## 2024-06-17 RX ADMIN — Medication 20 ML: at 01:06

## 2024-06-17 RX ADMIN — DIPHENHYDRAMINE HYDROCHLORIDE 25 MG: 50 INJECTION INTRAMUSCULAR; INTRAVENOUS at 01:06

## 2024-06-17 RX ADMIN — FERRIC CARBOXYMALTOSE INJECTION 750 MG: 50 INJECTION, SOLUTION INTRAVENOUS at 01:06

## 2024-08-06 ENCOUNTER — LAB VISIT (OUTPATIENT)
Dept: LAB | Facility: HOSPITAL | Age: 74
End: 2024-08-06
Attending: INTERNAL MEDICINE
Payer: MEDICARE

## 2024-08-06 DIAGNOSIS — Z79.899 ENCOUNTER FOR LONG-TERM (CURRENT) USE OF OTHER MEDICATIONS: ICD-10-CM

## 2024-08-06 DIAGNOSIS — E78.5 HYPERLIPIDEMIA, UNSPECIFIED HYPERLIPIDEMIA TYPE: ICD-10-CM

## 2024-08-06 DIAGNOSIS — I50.22 CHRONIC SYSTOLIC HEART FAILURE: ICD-10-CM

## 2024-08-06 DIAGNOSIS — I10 ESSENTIAL HYPERTENSION, MALIGNANT: Primary | ICD-10-CM

## 2024-08-06 LAB
ANION GAP SERPL CALC-SCNC: 7 MEQ/L
BUN SERPL-MCNC: 61 MG/DL (ref 9.8–20.1)
CALCIUM SERPL-MCNC: 9.9 MG/DL (ref 8.4–10.2)
CHLORIDE SERPL-SCNC: 93 MMOL/L (ref 98–107)
CHOLEST SERPL-MCNC: 95 MG/DL
CHOLEST/HDLC SERPL: 4 {RATIO} (ref 0–5)
CO2 SERPL-SCNC: 38 MMOL/L (ref 23–31)
CREAT SERPL-MCNC: 2.13 MG/DL (ref 0.55–1.02)
CREAT/UREA NIT SERPL: 29
ERYTHROCYTE [DISTWIDTH] IN BLOOD BY AUTOMATED COUNT: 17.4 % (ref 11.5–17)
GFR SERPLBLD CREATININE-BSD FMLA CKD-EPI: 24 ML/MIN/1.73/M2
GLUCOSE SERPL-MCNC: 122 MG/DL (ref 82–115)
HCT VFR BLD AUTO: 37.7 % (ref 37–47)
HDLC SERPL-MCNC: 26 MG/DL (ref 35–60)
HGB BLD-MCNC: 12 G/DL (ref 12–16)
LDLC SERPL CALC-MCNC: 58 MG/DL (ref 50–140)
MCH RBC QN AUTO: 30.2 PG (ref 27–31)
MCHC RBC AUTO-ENTMCNC: 31.8 G/DL (ref 33–36)
MCV RBC AUTO: 95 FL (ref 80–94)
PLATELET # BLD AUTO: 123 X10(3)/MCL (ref 130–400)
PMV BLD AUTO: 12 FL (ref 7.4–10.4)
POTASSIUM SERPL-SCNC: 4.4 MMOL/L (ref 3.5–5.1)
RBC # BLD AUTO: 3.97 X10(6)/MCL (ref 4.2–5.4)
SODIUM SERPL-SCNC: 138 MMOL/L (ref 136–145)
TRIGL SERPL-MCNC: 57 MG/DL (ref 37–140)
VLDLC SERPL CALC-MCNC: 11 MG/DL
WBC # BLD AUTO: 3.42 X10(3)/MCL (ref 4.5–11.5)

## 2024-08-06 PROCEDURE — 85027 COMPLETE CBC AUTOMATED: CPT

## 2024-08-06 PROCEDURE — 36415 COLL VENOUS BLD VENIPUNCTURE: CPT

## 2024-08-06 PROCEDURE — 80048 BASIC METABOLIC PNL TOTAL CA: CPT

## 2024-08-06 PROCEDURE — 80061 LIPID PANEL: CPT

## 2024-08-27 NOTE — NURSING
Nurses Note -- 4 Eyes      8/27/2024   9:26 AM      Skin assessed during: Daily Assessment      [x] No Altered Skin Integrity Present    []Prevention Measures Documented      [] Yes- Altered Skin Integrity Present or Discovered   [] LDA Added if Not in Epic (Describe Wound)   [] New Altered Skin Integrity was Present on Admit and Documented in LDA   [] Wound Image Taken    Wound Care Consulted? No    Attending Nurse:  Fred Milligan RN/Staff Member:  Varsha MIGUEL

## 2024-08-27 NOTE — PROGRESS NOTES
"Pharmacokinetic Initial Assessment: IV Vancomycin    Assessment/Plan:    Initiate intravenous vancomycin with loading dose of 1250 mg once with subsequent doses when random concentrations are less than 20 mcg/mL  Desired empiric serum trough concentration is 15 to 20 mcg/mL  Draw vancomycin random level on 08/27/24 at 1930.  Pharmacy will continue to follow and monitor vancomycin.      Please contact pharmacy at extension [phone] with any questions regarding this assessment.     Thank you for the consult,   Jesi Maier       Patient brief summary:  Rahel Bagley is a 74 y.o. female initiated on antimicrobial therapy with IV Vancomycin for treatment of suspected sepsis    Drug Allergies:   Review of patient's allergies indicates:   Allergen Reactions    Fish containing products Anaphylaxis    Shellfish containing products Anaphylaxis    Statins-hmg-coa reductase inhibitors Hives and Swelling    Phenergan [promethazine] Hives    Wool Blisters    Iodine and iodide containing products Rash     Iodine and seafood allergy    Onion Nausea And Vomiting and Other (See Comments)     High Blood pressure and Headaches    Opioids - morphine analogues Nausea And Vomiting    Tetanus vaccines and toxoid Nausea And Vomiting    Zofran [ondansetron hcl] Nausea And Vomiting       Actual Body Weight:   63.5 kg    Renal Function:   Estimated Creatinine Clearance: 22.4 mL/min (A) (based on SCr of 1.93 mg/dL (H)).,     Dialysis Method (if applicable):  N/A    CBC (last 72 hours):  No results for input(s): "WHITE BLOOD CELL COUNT", "HEMOGLOBIN", "HEMATOCRIT", "PLATELETS", "GRAN%", "LYMPH%", "MONO%", "EOSINOPHIL%", "BASOPHIL%", "DIFFERENTIAL METHOD" in the last 72 hours.    Metabolic Panel (last 72 hours):  No results for input(s): "SODIUM", "POTASSIUM", "CHLORIDE", "CO2", "GLUCOSE", "BUN BLD", "CREATININE", "ALBUMIN", "BILIRUBIN TOTAL", "ALK PHOS", "AST", "ALT", "MAGNESIUM", "PHOSPHORUS" in the last 72 hours.    Drug levels (last 3 " "results):  No results for input(s): "VANCOMYCINRA", "VANCORANDOM", "VANCOMYCINPE", "VANCOPEAK", "VANCOMYCINTR", "VANCOTROUGH" in the last 72 hours.    Microbiologic Results:  Microbiology Results (last 7 days)       Procedure Component Value Units Date/Time    Blood culture #1 **CANNOT BE ORDERED STAT** [[id number]]     Order Status: Sent Specimen: Blood     Blood culture #2 **CANNOT BE ORDERED STAT** [[id number]]     Order Status: Sent Specimen: Blood             "

## 2024-08-27 NOTE — NURSING
Nurses Note -- 4 Eyes      8/27/2024   2:42 AM      Skin assessed during: Admit      [] No Altered Skin Integrity Present    [x]Prevention Measures Documented      [x] Yes- Altered Skin Integrity Present or Discovered   [] LDA Added if Not in Epic (Describe Wound)   [x] New Altered Skin Integrity was Present on Admit and Documented in LDA   [x] Wound Image Taken    Wound Care Consulted? Yes    Attending Nurse:  Reyna Milligan RN/Staff Member:  rakesh MIGUEL

## 2024-08-27 NOTE — PROGRESS NOTES
Inpatient Nutrition Evaluation    Admit Date: 8/26/2024   Total duration of encounter: 1 day   Patient Age: 74 y.o.    Nutrition Recommendation/Prescription     No nutrition interventions needed at this time.    Nutrition Assessment     Chart Review    Reason Seen: malnutrition screening tool (MST)    Malnutrition Screening Tool Results   Have you recently lost weight without trying?: Unsure  Have you been eating poorly because of a decreased appetite?: No   MST Score: 2   Diagnosis:  Altered mental status  Hypoglycemia  Severe sepsis, source unknown  VINCENT vs CKD  Increased INR >15  Coffee ground fluid on OG  Lactic acidosis  Elevated troponin  Transaminitis    Relevant Medical History: CKD, DM, diverticulitis, HTN, MI, HLD, AND paroxysmal A-fib     Scheduled Medications:   Continuous Infusions:   PRN Medications:   Current Facility-Administered Medications:     lorazepam, 2 mg, Intravenous, Q15 Min PRN    morphine, 4 mg, Intravenous, Q15 Min PRN    Recent Labs   Lab 08/23/24  0510 08/26/24  1850 08/26/24  1928 08/26/24  2347 08/27/24  0324     --  140 137 136   K 5.1  --  5.5* 4.5 3.8   CALCIUM 10.3  --  10.5* 9.3 9.5   PHOS  --   --   --  4.4 3.3   MG  --   --  2.70* 2.10 2.20   CL 98*  --  90* 90* 92*   CO2 36*  --  22* 29 31   BUN 88*  --  72.4* 68.7* 69.6*   CREATININE 1.93*  --  2.17* 2.02* 1.93*   EGFRNORACEVR  --   --  23 25 27   GLUCOSE  --   --  161* 306* 234*   BILITOT  --   --  5.1* 4.9* 4.3*   ALKPHOS  --   --  64 50 55   ALT  --   --  169* 344* 322*   AST  --   --  464* 1,124* 1,027*   ALBUMIN  --   --  3.2* 2.5* 2.7*   WBC  --   --  7.16  --  8.10   HGB  --   --  13.9  --  10.8*   HCT  --  46 44.5  --  32.5*     Nutrition Orders:  No diet orders on file      Appetite/Oral Intake: not applicable/not applicable  Factors Affecting Nutritional Intake: NPO  Food/Amish/Cultural Preferences: unable to obtain  Food Allergies: fish, shellfish, and onion  Last Bowel Movement: 08/25/24  Wound(s):  knee  "abrasion noted    Comments    8/27/24: Noted plans for withdrawal of care/comfort measures. Confirmed with RN. No nutrition interventions needed at this time. Reconsult RD if aggressive care restarted.     Anthropometrics    Height: 5' 2" (157.5 cm),    Last Weight: 63.5 kg (140 lb) (08/27/24 1206), Weight Method: Estimated  BMI (Calculated): 25.6  BMI Classification: overweight (BMI 25-29.9)     Ideal Body Weight (IBW), Female: 110 lb     % Ideal Body Weight, Female (lb): 127.27 %                             Usual Weight Provided By: not applicable    Wt Readings from Last 5 Encounters:   08/27/24 63.5 kg (140 lb)   06/17/24 68 kg (149 lb 14.6 oz)   06/10/24 68 kg (149 lb 14.6 oz)   05/09/24 68 kg (150 lb)   02/28/24 73.5 kg (162 lb 0.6 oz)     Weight Change(s) Since Admission:   Wt Readings from Last 1 Encounters:   08/27/24 1206 63.5 kg (140 lb)   08/26/24 1859 63.5 kg (140 lb)   Admit Weight: 63.5 kg (140 lb) (08/26/24 1859), Weight Method: Estimated    Patient Education     Not applicable.    Nutrition Goals & Monitoring     Dietitian will monitor:  progression of care    Nutrition Risk/Follow-Up: low (follow-up in 5-7 days)  Patients assigned 'low nutrition risk' status do not qualify for a full nutritional assessment but will be monitored and re-evaluated in a 5-7 day time period. Please consult if re-evaluation needed sooner.   "

## 2024-08-27 NOTE — CARE UPDATE
Troponin is trending up with T wave inversion in lateral leads on EKG, patient might be in NSTEMI type 1, will consult [facility] in the morning, will hold on antiplatelet or heparin due to very high INR > 15 or on BB due to septic shock.  Will continue to trend q6 for now, getting echo.

## 2024-08-27 NOTE — PROCEDURES
Rahel Bagley is a 74 y.o. female patient.    Temp: (!) 101.1 °F (38.4 °C) (08/26/24 1901)  Pulse: 80 (08/27/24 0400)  Resp: 20 (08/27/24 0400)  BP: 115/73 (08/27/24 0400)  SpO2: 100 % (08/27/24 0400)  Weight: 63.5 kg (140 lb) (08/26/24 1859)       Arterial Line    Date/Time: 8/27/2024 6:04 AM  Location procedure was performed: [address] INTENSIVE CARE UNIT    Performed by: Vanessa Mendez MD  Authorized by: Vanessa Mendez MD  Consent Done: Not Needed  Preparation: Patient was prepped and draped in the usual sterile fashion.  Indications: hemodynamic monitoring  Location: left radial  Number of attempts: 3  Technical procedures used: US guided  Complications: No  Estimated blood loss (mL): 5  Specimens: No  Implants: No  Post-procedure: dressing applied  Post-procedure CMS: normal  Patient tolerance: Patient tolerated the procedure well with no immediate complications          8/27/2024

## 2024-08-27 NOTE — PLAN OF CARE
08/27/24 1439   Discharge Assessment   Assessment Type Discharge Planning Assessment   Confirmed/corrected address, phone number and insurance Yes   Confirmed Demographics Correct on Facesheet   Source of Information family   Discharge Plan A Comfort care/withdrawal   Discharge Plan B Comfort care/withdrawal

## 2024-08-27 NOTE — CONSULTS
Inpatient consult to Cardiology  Consult performed by: Doni Allen ANP  Consult ordered by: Vanessa Mendez MD  Reason for consult: NSTEMI        [facility]    Cardiology  Consult Note    Patient Name: Rahel Bagley  MRN: [id number]  Admission Date: 8/26/2024  Hospital Length of Stay: 1 days  Code Status: Prior   Attending Provider: Ronnell Rondon MD   Consulting Provider: ROSEANNE Edwards  Primary Care Physician: Marla Taveras MD  Principal Problem:<principal problem not specified>    Patient information was obtained from past medical records and ER records.     Subjective:     Chief Complaint/Reason for Consult: NSTEMI     HPI: Ms. Bagley is a 75 y/o female who is unknown to [facility], Dr. Seals. The patient presented to [facility] on 8.26.24 after being found unresponsive at home. The POA reported that the PT did have a DNR prior to EMS being call, but they revoked the DNR and is now a Full Code. The family reported that she started to decline recently and was not eating well. Upon EMS arrival the PT would moan to a sternal rub. He initial Blood Sugar was < 20. Upon arrival to the ER she was worked up and found to have: BUN/Crea 68.7/2.02, AST/ALT 1124/344, BNP 6232, Troponin 0.293. Lactic Acid 12.2, INR > 15. She was intubated for Hypoxemic Respiratory Failure and admitted to ICU for Encephalopathy, Hypoglycemia and Sepsis. [facility] was Consulted for NSTEMI.     PMH: CKD Stage IV, DM II with CKD Stage IV, Diverticulosis, HTN, MI, HLD, Hypothyroidism, PAF, OA, Obesity, CAD/CABG/Stents, MVR  PSH: Appendectomy, Angiogram, CABG, Debridement, ANA, PPM, Knee Surgery, Tonsillectomy, Shoulder Surgery, MV Surgery, Nicole Clip (8.19.24)  Family History: Mother and Father, D, MI  Social History: Denies Illicit Drug, ETOH and Tobacco Use     Previous Cardiac Diagnostics:   [facility]/Nicole Clip 8.19.24:  Procedures performed:   1.  Ultrasound-guided access   2.  Transseptal access   3.  Percutaneous mitral valve  repair, Nicole clip   4.  Transesophageal echocardiogram   5.  [facility]     SCOOTER 7.25.24:  IMPRESSION:   1. Severe systolic dysfuntion   2.  ANTONIO device at A2P2 segment, MR jet primarily originating from lateral   aspect of ANTONIO device   3.  Left to right shunt at site of previous trans-septal site   RECOMMENDATIONS:    Consideration for repeat ANTONIO ongoing   Medical therapy continues at this time     ECHO 10.27.20:  Dilated left ventricle with moderately depressed ventricular function with   an estimated ejection fraction of 35%   Left ventricular hypertrophy   Left atrial enlargement   Aortic sclerosis   Mitral annular calcification   Mild mitral regurgitation   Pacemaker wire present in right-sided chambers   Left Ventricle   The left ventricular cavity size is mildly dilated. Moderately (35-40%) decreased ejection fraction. Abnormal septal motion. Left ventricular diastolic dysfunction.     [facility] 6.28.23:  The SVG to OM Graft was 100% stenosed.  The Prox RCA lesion was 65% stenosed.  The RPDA lesion was 50% stenosed.  The Ost Cx to Prox Cx Stent was 95% stenosed with 0% stenosis post-intervention.  The left ventricular end diastolic pressure was normal.  The estimated blood loss was none.    Review of patient's allergies indicates:   Allergen Reactions    Fish containing products Anaphylaxis    Shellfish containing products Anaphylaxis    Statins-hmg-coa reductase inhibitors Hives and Swelling    Phenergan [promethazine] Hives    Wool Blisters    Iodine and iodide containing products Rash     Iodine and seafood allergy    Onion Nausea And Vomiting and Other (See Comments)     High Blood pressure and Headaches    Opioids - morphine analogues Nausea And Vomiting    Tetanus vaccines and toxoid Nausea And Vomiting    Zofran [ondansetron hcl] Nausea And Vomiting     No current facility-administered medications on file prior to encounter.     Current Outpatient Medications on File Prior to Encounter   Medication Sig     carvediloL (COREG) 6.25 MG tablet     FARXIGA 10 mg tablet Take 10 mg by mouth once daily.    fenofibrate micronized (LOFIBRA) 200 MG Cap Take 200 mg by mouth.    furosemide (LASIX) 40 MG tablet Take 40 mg by mouth every morning.    gabapentin (NEURONTIN) 300 MG capsule Take 300 mg by mouth 2 (two) times daily.    hydrALAZINE (APRESOLINE) 25 MG tablet Take 25 mg by mouth 2 (two) times daily.    JANUVIA 25 mg Tab Take 25 mg by mouth Daily.    levothyroxine (TIROSINT) 88 mcg Cap Take 1 capsule by mouth Daily.    metOLazone (ZAROXOLYN) 2.5 MG tablet Take 2.5 mg by mouth. 1 tablet three times a week (Monday, Wednesday, and Friday)    REPATHA SURECLICK 140 mg/mL PnIj     torsemide (DEMADEX) 20 MG Tab Take 40 mg by mouth.     Review of Systems   Unable to perform ROS: Intubated     Objective:     Vital Signs (Most Recent):  Temp: 98.4 °F (36.9 °C) (08/27/24 0400)  Pulse: 80 (08/27/24 0615)  Resp: 20 (08/27/24 0615)  BP: 111/68 (08/27/24 0615)  SpO2: 100 % (08/27/24 0615) Vital Signs (24h Range):  Temp:  [98.2 °F (36.8 °C)-101.1 °F (38.4 °C)] 98.4 °F (36.9 °C)  Pulse:  [] 80  Resp:  [12-47] 20  SpO2:  [86 %-100 %] 100 %  BP: ()/(51-93) 111/68  Arterial Line BP: (116-153)/(52-75) 126/60   Weight: 63.5 kg (140 lb)  Body mass index is 25.61 kg/m².  SpO2: 100 %       Intake/Output Summary (Last 24 hours) at 8/27/2024 0759  Last data filed at 8/27/2024 0631  Gross per 24 hour   Intake 4463.75 ml   Output 500 ml   Net 3963.75 ml     Lines/Drains/Airways       Drain  Duration                  NG/OG Tube 08/26/24 1853 [place] sump 18 Fr. Center mouth <1 day         Urethral Catheter 08/26/24 2309 <1 day              Airway  Duration                  Airway - Non-Surgical 08/26/24 1852 Endotracheal Tube <1 day              Arterial Line  Duration             Arterial Line 08/27/24 0000 Right Radial <1 day              Peripheral Intravenous Line  Duration                  Peripheral IV - Single Lumen 08/26/24 1858 20 G  Anterior;Left Shoulder <1 day         Peripheral IV - Single Lumen 08/27/24 0000 18 G Anterior;Left;Proximal Forearm <1 day                  Significant Labs:   Chemistries:   Recent Labs   Lab 08/23/24  0510 08/26/24 1928 08/26/24 2347 08/27/24  0324    140 137 136   K 5.1 5.5* 4.5 3.8   CL 98* 90* 90* 92*   CO2 36* 22* 29 31   BUN 88* 72.4* 68.7* 69.6*   CREATININE 1.93* 2.17* 2.02* 1.93*   CALCIUM 10.3 10.5* 9.3 9.5   BILITOT  --  5.1* 4.9* 4.3*   ALKPHOS  --  64 50 55   ALT  --  169* 344* 322*   AST  --  464* 1,124* 1,027*   GLUCOSE  --  161* 306* 234*   MG  --  2.70* 2.10 2.20   PHOS  --   --  4.4 3.3   TROPONINI  --  0.293*  --  1.988*        CBC/Anemia Labs: Coags:    Recent Labs   Lab 08/26/24  1850 08/26/24 1928 08/27/24 0324   WBC  --  7.16 8.10   HGB  --  13.9 10.8*   HCT 46 44.5 32.5*   PLT  --  118* 82*   MCV  --  96.3* 90.5   RDW  --  20.8* 20.0*    Recent Labs   Lab 08/22/24 2114 08/26/24 2027 08/26/24  2342   INR 1.6 >15.0* >15.0*        Significant Imaging:  Imaging Results              CT Head Without Contrast (Final result)  Result time 08/26/24 19:50:45      Final result by Asad Dodge MD (08/26/24 19:50:45)                   Impression:      No acute intracranial findings.      Electronically signed by: Asad Dodge  Date:    08/26/2024  Time:    19:50               Narrative:    EXAMINATION:  CT HEAD WITHOUT CONTRAST    CLINICAL HISTORY:  Mental status change, unknown cause;    TECHNIQUE:  CT imaging of the head performed from the skull base to the vertex without intravenous contrast.  DLP 1105 mGycm. Automatic exposure control, adjustment of mA/kV or iterative reconstruction technique was used to reduce radiation.    COMPARISON:  25 June 2023    FINDINGS:  There is no acute cortical infarct, hemorrhage or mass lesion.  No new parenchymal attenuation abnormality.  Ventricular size is stable.  There are vascular calcifications.    Endotracheal and enteric tubes are noted.                                        X-Ray Chest 1 View (Final result)  Result time 08/26/24 19:45:54      Final result by Brendan Guzman MD (08/26/24 19:45:54)                   Impression:      As above.      Electronically signed by: Brendan Guzman  Date:    08/26/2024  Time:    19:45               Narrative:    EXAMINATION:  XR CHEST 1 VIEW    CLINICAL HISTORY:  post intubation;    TECHNIQUE:  Single view of the chest    COMPARISON:  06/25/2023    FINDINGS:  Cardiomegaly remains with postsurgical changes of median sternotomy.  Right pleural effusion increased in the interval.  ET tube terminates at the level of the clavicular heads.                                    EKG:       Telemetry: Paced    Physical Exam  Constitutional:       General: She is not in acute distress.     Appearance: Normal appearance. She is obese. She is ill-appearing.      Comments: Vented/Sedated   HENT:      Head: Normocephalic.      Mouth/Throat:      Mouth: Mucous membranes are moist.   Eyes:      Extraocular Movements: Extraocular movements intact.      Conjunctiva/sclera: Conjunctivae normal.   Cardiovascular:      Rate and Rhythm: Normal rate and regular rhythm.      Pulses: Normal pulses.      Heart sounds: Murmur heard.   Pulmonary:      Effort: Pulmonary effort is normal. No respiratory distress.      Breath sounds: Rhonchi present.      Comments: Ventilator Associated Breath Sounds  Vent Mode: A/C  Oxygen Concentration (%):  [30-60] 30  Resp Rate Total:  [18 br/min-20 br/min] 18 br/min  Vt Set:  [450 mL] 450 mL  PEEP/CPAP:  [5 cmH20] 5 cmH20  Mean Airway Pressure:  [8 gsD37-31 cmH20] 9 cmH20  Abdominal:      Palpations: Abdomen is soft.   Musculoskeletal:         General: No swelling. Normal range of motion.   Skin:     General: Skin is warm.      Comments: BUE Ecchymosis    Neurological:      Comments: Vented/Sedated       Home Medications:   No current facility-administered medications on file prior to encounter.     Current  Outpatient Medications on File Prior to Encounter   Medication Sig Dispense Refill    carvediloL (COREG) 6.25 MG tablet       FARXIGA 10 mg tablet Take 10 mg by mouth once daily.      fenofibrate micronized (LOFIBRA) 200 MG Cap Take 200 mg by mouth.      furosemide (LASIX) 40 MG tablet Take 40 mg by mouth every morning.      gabapentin (NEURONTIN) 300 MG capsule Take 300 mg by mouth 2 (two) times daily.      hydrALAZINE (APRESOLINE) 25 MG tablet Take 25 mg by mouth 2 (two) times daily.      JANUVIA 25 mg Tab Take 25 mg by mouth Daily.      levothyroxine (TIROSINT) 88 mcg Cap Take 1 capsule by mouth Daily.      metOLazone (ZAROXOLYN) 2.5 MG tablet Take 2.5 mg by mouth. 1 tablet three times a week (Monday, Wednesday, and Friday)      REPATHA SURECLICK 140 mg/mL PnIj       torsemide (DEMADEX) 20 MG Tab Take 40 mg by mouth.       Current Schedule Inpatient Medications:   [START ON 8/28/2024] mupirocin   Nasal BID    pantoprazole  40 mg Intravenous BID    piperacillin-tazobactam (Zosyn) IV (PEDS and ADULTS) (extended infusion is not appropriate)  4.5 g Intravenous Q8H     Continuous Infusions:   lactated ringers   Intravenous Continuous   Stopped at 08/27/24 0602    NORepinephrine bitartrate-D5W  0-3 mcg/kg/min Intravenous Continuous   Stopped at 08/27/24 0441    propofoL  0-50 mcg/kg/min Intravenous Continuous 11.4 mL/hr at 08/27/24 0631 30 mcg/kg/min at 08/27/24 0631     Assessment:   NSTEMI Type II due to Hypoglycemia, VINCENT/CKD, Lactic Acidosis/Sepsis   Metabolic Encephalopathy/AMS  DM II with Severe Hypoglycemia  Sepsis - Unknown Source  VINCENT/CKD (Unknown Stage)  Supratherapeutic INR > 15  Lactic Acidosis - Improving  Transaminitis   Hypothyroidism  HTN  MI  Hx of Severe MR s/p Nicole Clipping (8.19.24)  PAF - Now Paced    - s/p Device (Unknown Brand/Type)  CAD/CABG/Stents  OA  ICMO/EF 25%  Previously Under Hospice Care    Plan:   Pts Prognosis is very POOR and was previously under Hospice Care  DNR Revocated upon being  Unresponsive  Long Discussion with Pts Daughter and Granddaughter, will place PT as DNR  Continue Goals of Care Discussion  No Plans for Invasive Workup  We wish the PT and her Family Well and will keep them in our thoughts and prayers  We will be available as needed    Thank you for your consult.     ROSEANNE Edwards  Cardiology  [facility]  08/27/2024     I agree with the findings of the complexity of problems addressed and take responsibility for the plan's risks and complications. I approved the plan documented by Doni Allen NP.

## 2024-08-27 NOTE — ED PROVIDER NOTES
Encounter Date: 8/26/2024    SCRIBE #1 NOTE: I, Smia Rondon, erika scribing for, and in the presence of,  Ben Moreland MD. I have scribed the following portions of the note - the EKG reading. Other sections scribed: HPI, ROS, PE.       History     Chief Complaint   Patient presents with    Hypoglycemia     Unresponsive on arrival. CBG < 20.      74 year old female with a pmhx of CKD, DM, diverticulitis, HTN, MI, HLD, AND paroxysmal A-fib presents to the ED via EMS from her home after becoming unresponsive.  EMS reports that the patient is on hospice, and after speaking with the patient's power of , they reported to EMS that the patient's DNR status was being revoked and changed to full code today.  They report that the patient's family states that she began declining recently.  The patient's family reports that she was not eating today and became unresponsive, so they called EMS.  EMS reports that she would moan in response to a sternal rub.  Upon arrival, the patients blood sugar was <20.  The history and review of symptoms of the patient are limited due to the patient's condition.    The history is provided by the EMS personnel and a relative. The history is limited by the condition of the patient. No  was used.     Review of patient's allergies indicates:   Allergen Reactions    Fish containing products Anaphylaxis    Shellfish containing products Anaphylaxis    Statins-hmg-coa reductase inhibitors Hives and Swelling    Phenergan [promethazine] Hives    Wool Blisters    Iodine and iodide containing products Rash     Iodine and seafood allergy    Onion Nausea And Vomiting and Other (See Comments)     High Blood pressure and Headaches    Opioids - morphine analogues Nausea And Vomiting    Tetanus vaccines and toxoid Nausea And Vomiting    Zofran [ondansetron hcl] Nausea And Vomiting     Past Medical History:   Diagnosis Date    Chronic kidney disease, unspecified     Pt states  stage 4    Diabetes     Diverticulitis     Essential (primary) hypertension     Heart attack     Hypercholesteremia     Hypothyroidism     Mixed hyperlipidemia     Nonrheumatic mitral (valve) insufficiency     Obesity, unspecified     Paroxysmal atrial fibrillation     Unspecified osteoarthritis, unspecified site      Past Surgical History:   Procedure Laterality Date    APPENDECTOMY      CORONARY ARTERY BYPASS GRAFT      DEBRIDEMENT Right 12/21/2021    Muscle and/or fascia    HYSTERECTOMY      INSERTION OF PACEMAKER      KNEE SURGERY      LEFT HEART CATHETERIZATION Left 6/28/2023    Procedure: Left heart cath;  Surgeon: Khris Oconnor MD;  Location: [facility] CATH LAB;  Service: Cardiology;  Laterality: Left;    MITRAL VALVE SURGERY      SHOULDER SURGERY      Stent in heart       STENT, DRUG ELUTING, SINGLE VESSEL, CORONARY  6/28/2023    Procedure: Stent, Drug Eluting, Single Vessel, Coronary;  Surgeon: Khris Oconnor MD;  Location: [facility] CATH LAB;  Service: Cardiology;;    TONSILLECTOMY       Family History   Problem Relation Name Age of Onset    Heart attack Mother      Heart attacks under age 50 Father      Cancer Sister       Social History     Tobacco Use    Smoking status: Never    Smokeless tobacco: Never   Substance Use Topics    Alcohol use: Not Currently    Drug use: Never     Review of Systems   Unable to perform ROS: Patient unresponsive       Physical Exam     Initial Vitals   BP Pulse Resp Temp SpO2   08/26/24 1835 08/26/24 1835 08/26/24 1835 08/26/24 1901 08/26/24 1835   106/78 (!) 117 (!) 41 (!) 101.1 °F (38.4 °C) (!) 92 %      MAP       --                Physical Exam    Nursing note and vitals reviewed.  Constitutional: She is not diaphoretic.   Chronically ill-appearing   HENT:   Head: Normocephalic and atraumatic.   Neck: Neck supple.   Cardiovascular:  Regular rhythm.   Tachycardia present.         No murmur heard.  Abdominal: Abdomen is soft. She exhibits no distension.   Musculoskeletal:       Cervical back: Neck supple.      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     Neurological: She is unresponsive.   Will occasionally open eyes to sternal rub   Skin: Skin is warm. Capillary refill takes less than 2 seconds.         ED Course   Intubation    Date/Time: 8/26/2024 6:49 PM  Location procedure was performed: [facility] EMERGENCY DEPARTMENT    Performed by: Ben Moreland IV, MD  Authorized by: Ben Moreland IV, MD  Consent Done: Emergent Situation  Indications: respiratory distress  Intubation method: video-assisted  Patient status: sedated  Preoxygenation: bag valve mask  Sedatives: etomidate  Paralytic: rocuronium  Laryngoscope size: Mac 4  Tube size: 7.5 mm  Tube type: cuffed  Number of attempts: 1  Cricoid pressure: yes  Cords visualized: yes  Post-procedure assessment: chest rise, ETCO2 monitor and CO2 detector  Breath sounds: clear  Cuff inflated: yes  ETT to lip: 23 cm  Tube secured with: bite block, adhesive tape and ETT soto  Chest x-ray findings: endotracheal tube too high  Tube repositioned: tube repositioned successfully  Patient tolerance: Patient tolerated the procedure well with no immediate complications  Complications: No  Specimens: No  Implants: No      Critical Care    Date/Time: 8/26/2024 6:33 PM    Performed by: Ben Moreland IV, MD  Authorized by: Ben Moreland IV, MD  Total critical care time (exclusive of procedural time) : 46 minutes  Critical care time was exclusive of separately billable procedures and treating other patients.  Critical care was necessary to treat or prevent imminent or life-threatening deterioration of the following conditions: respiratory failure.  Critical care was time spent personally by me on the following activities: development of treatment plan with patient or surrogate, discussions with consultants, interpretation of cardiac output measurements, evaluation of patient's response to treatment, examination of patient, obtaining  history from patient or surrogate, ordering and performing treatments and interventions, ordering and review of laboratory studies, ordering and review of radiographic studies, pulse oximetry, re-evaluation of patient's condition, review of old charts, blood draw for specimens and vascular access procedures.        Labs Reviewed   COMPREHENSIVE METABOLIC PANEL - Abnormal       Result Value    Sodium 140      Potassium 5.5 (*)     Chloride 90 (*)     CO2 22 (*)     Glucose 161 (*)     Blood Urea Nitrogen 72.4 (*)     Creatinine 2.17 (*)     Calcium 10.5 (*)     Protein Total 6.5      Albumin 3.2 (*)     Globulin 3.3      Albumin/Globulin Ratio 1.0 (*)     Bilirubin Total 5.1 (*)     ALP 64       (*)      (*)     eGFR 23      Anion Gap 28.0      BUN/Creatinine Ratio 33     MAGNESIUM - Abnormal    Magnesium Level 2.70 (*)    LACTIC ACID, PLASMA - Abnormal    Lactic Acid Level 12.2 (*)    PROTIME-INR - Abnormal    PT >120.0 (*)     INR >15.0 (*)    TROPONIN I - Abnormal    Troponin-I 0.293 (*)    URINALYSIS, REFLEX TO URINE CULTURE - Abnormal    Color, UA Yellow      Appearance, UA Turbid (*)     Specific Gravity, UA 1.013      pH, UA 5.5      Protein, UA 2+ (*)     Glucose, UA 4+ (*)     Ketones, UA Negative      Blood, UA 3+ (*)     Bilirubin, UA 1+ (*)     Urobilinogen, UA Normal      Nitrites, UA Negative      Leukocyte Esterase, UA 75 (*)     RBC, UA 21-50 (*)     WBC, UA 6-10 (*)     Bacteria, UA Trace      Budding Yeast, UA Few (*)     Squamous Epithelial Cells, UA Trace      Mucous, UA Occasional (*)     Hyaline Casts, UA 3-5 (*)     Amorphous Crystal, UA Trace (*)    TSH - Abnormal    TSH 5.329 (*)    CBC WITH DIFFERENTIAL - Abnormal    WBC 7.16      RBC 4.62      Hgb 13.9      Hct 44.5      MCV 96.3 (*)     MCH 30.1      MCHC 31.2 (*)     RDW 20.8 (*)     Platelet 118 (*)     MPV 12.5 (*)     Neut % 80.1      Lymph % 10.1      Mono % 9.1      Eos % 0.0      Basophil % 0.4      Lymph # 0.72       Neut # 5.74      Mono # 0.65      Eos # 0.00      Baso # 0.03      IG# 0.02      IG% 0.3      NRBC% 1.4     LACTIC ACID, PLASMA - Abnormal    Lactic Acid Level 7.8 (*)    COVID/RSV/FLU A&B PCR - Normal    Influenza A PCR Not Detected      Influenza B PCR Not Detected      Respiratory Syncytial Virus PCR Not Detected      SARS-CoV-2 PCR Not Detected      Narrative:     The Xpert Xpress SARS-CoV-2/FLU/RSV plus is a rapid, multiplexed real-time PCR test intended for the simultaneous qualitative detection and differentiation of SARS-CoV-2, Influenza A, Influenza B, and respiratory syncytial virus (RSV) viral RNA in either nasopharyngeal swab or nasal swab specimens.         MRSA PCR - Normal    MRSA PCR Screen Not Detected      Narrative:     The Xpert MRSA Assay utilizes automated real-time polymerase chain reaction (PCR) to detect MRSA DNA.  A positive test result does not necessarily indicate the presence of viable organism.  It is however, presumptive for the presence of MRSA.   BLOOD CULTURE [facility]   BLOOD CULTURE [facility]   CBC W/ AUTO DIFFERENTIAL    Narrative:     The following orders were created for panel order CBC auto differential.  Procedure                               Abnormality         Status                     ---------                               -----------         ------                     CBC with Differential[[id number]]       Abnormal            Final result                 Please view results for these tests on the individual orders.   BLOOD GAS     EKG Readings: (Independently Interpreted)   Initial Reading: No STEMI. Rhythm: Atrial Fibrillation. Heart Rate: 120. Ectopy: No Ectopy. Conduction: LBBB. ST Segments: Normal ST Segments. T Waves: Normal. Clinical Impression: Atrial Fibrillation with RVR with LBBB Other Impression: No obvious ischemic changes   Performed at 2013       Imaging Results              CT Head Without Contrast (Final result)  Result time 08/26/24 19:50:45      Final result by  Asad Dodge MD (08/26/24 19:50:45)                   Impression:      No acute intracranial findings.      Electronically signed by: Asad Dodge  Date:    08/26/2024  Time:    19:50               Narrative:    EXAMINATION:  CT HEAD WITHOUT CONTRAST    CLINICAL HISTORY:  Mental status change, unknown cause;    TECHNIQUE:  CT imaging of the head performed from the skull base to the vertex without intravenous contrast.  DLP 1105 mGycm. Automatic exposure control, adjustment of mA/kV or iterative reconstruction technique was used to reduce radiation.    COMPARISON:  25 June 2023    FINDINGS:  There is no acute cortical infarct, hemorrhage or mass lesion.  No new parenchymal attenuation abnormality.  Ventricular size is stable.  There are vascular calcifications.    Endotracheal and enteric tubes are noted.                                       X-Ray Chest 1 View (Final result)  Result time 08/26/24 19:45:54      Final result by Brendan Guzman MD (08/26/24 19:45:54)                   Impression:      As above.      Electronically signed by: Brendan Guzman  Date:    08/26/2024  Time:    19:45               Narrative:    EXAMINATION:  XR CHEST 1 VIEW    CLINICAL HISTORY:  post intubation;    TECHNIQUE:  Single view of the chest    COMPARISON:  06/25/2023    FINDINGS:  Cardiomegaly remains with postsurgical changes of median sternotomy.  Right pleural effusion increased in the interval.  ET tube terminates at the level of the clavicular heads.                                       Medications   etomidate injection (20 mg Intravenous Given 8/26/24 1850)   rocuronium injection (100 mg Intravenous Given 8/26/24 1850)   amiodarone in dextrose 150 mg/100 mL (1.5 mg/mL) loading dose (has no administration in time range)   propofol (DIPRIVAN) 10 mg/mL infusion (5 mcg/kg/min × 63.5 kg Intravenous New Bag 8/26/24 2054)   lactated ringers bolus 1,000 mL (has no administration in time range)   propofoL (DIPRIVAN) 10 mg/mL  infusion (has no administration in time range)   piperacillin-tazobactam (ZOSYN) 4.5 g in D5W 100 mL IVPB (MB+) (has no administration in time range)   vancomycin - pharmacy to dose (has no administration in time range)   vancomycin 1.25 g in dextrose 5% 250 mL IVPB (ready to mix) (1,250 mg Intravenous New Bag 8/26/24 2115)   mupirocin 2 % ointment (has no administration in time range)   dextrose 10% bolus 1,000 mL 1,000 mL (0 mLs Intravenous Stopped 8/26/24 1856)   lactated ringers bolus 1,905 mL (0 mLs Intravenous Stopped 8/26/24 2036)   piperacillin-tazobactam (ZOSYN) 4.5 g in D5W 100 mL IVPB (MB+) (0 g Intravenous Stopped 8/26/24 2024)     Medical Decision Making  74-year-old presenting unresponsive found by family at home earlier  Hypoxic on a non-rebreather not controlling secretions - also hypoglycemic on arrival once sugar corrected however mental status did not improve so decision made to intubate  Patient also clinically septic with AFib RVR slight hypotension fever  Treated with broad-spectrum antibiotics IV fluids admitted to ICU for further workup and treatment    The differential diagnoses includes but is not limited to:  Judging by the patient's chief complaint and pertinent history, the patient has the following possible differential diagnoses, including but not limited to the following.  Some of these are deemed to be lower likelihood and some more likely based on my physical exam and history combined with possible lab work and/or imaging studies.   Please see the pertinent studies, and refer to the HPI.  Some of these diagnoses will take further evaluation to fully rule out, perhaps as an outpatient and the patient was encouraged to follow up when discharged for more comprehensive evaluation.    Metabolic abnormality, intoxication, toxic ingestion, CVA, infection, structural (SAH, ICH, trauma, neoplastic), seizure/postictal, polypharmacy       Problems Addressed:  Endotracheally intubated: acute  illness or injury that poses a threat to life or bodily functions  Sepsis, due to unspecified organism, unspecified whether acute organ dysfunction present: acute illness or injury that poses a threat to life or bodily functions  Tachycardia: acute illness or injury that poses a threat to life or bodily functions  Unresponsive: acute illness or injury that poses a threat to life or bodily functions    Amount and/or Complexity of Data Reviewed  Independent Historian: EMS     Details: EMS reports that the patient is on hospice, and after speaking with the patient's power of , they reported to EMS that the patient's DNR status was being revoked and changed to full code today.  They report that the patient's family states that she began declining recently.  The patient's family reports that she was not eating today and became unresponsive, so they called EMS.  EMS reports that she would moan in response to a sternal rub.  Upon arrival, the patients blood sugar was <20.  The history and review of symptoms of the patient are limited due to the patient's condition.  Labs: ordered.  Radiology: ordered and independent interpretation performed.     Details: ETT in position OG tube in position  ECG/medicine tests: ordered and independent interpretation performed. Decision-making details documented in ED Course.     Details: Initial Reading: No STEMI. Rhythm: Atrial Fibrillation. Heart Rate: 120. Ectopy: No Ectopy. Conduction: LBBB. ST Segments: Normal ST Segments. T Waves: Normal. Clinical Impression: Atrial Fibrillation with RVR with LBBB Other Impression: No obvious ischemic changes   Performed at 2013   Discussion of management or test interpretation with external provider(s): Discussed with ICU team will admit    Risk  Prescription drug management.  Drug therapy requiring intensive monitoring for toxicity.  Decision regarding hospitalization.    Critical Care  Total time providing critical care: 46 minutes             Scribe Attestation:   Scribe #1: I performed the above scribed service and the documentation accurately describes the services I performed. I attest to the accuracy of the note.    Attending Attestation:           Physician Attestation for Scribe:  Physician Attestation Statement for Scribe #1: I, Ben Moreland MD, reviewed documentation, as scribed by Sima Rondon in my presence, and it is both accurate and complete.             ED Course as of 08/26/24 2148   Mon Aug 26, 2024   1907 This is a 74-year-old - presenting confused GCS 3-4, per report history of AFib on hospice [AC]   2002 Paged ICU [MB]   2009 ICU will admit  [AC]      ED Course User Index  [AC] Ben Moreland IV, MD  [MB] Sima Rondon                           Clinical Impression:  Final diagnoses:  [Z97.8] Endotracheally intubated  [R00.0] Tachycardia  [R41.89] Unresponsive (Primary)  [A41.9] Sepsis, due to unspecified organism, unspecified whether acute organ dysfunction present  [E16.2] Hypoglycemia          ED Disposition Condition    Admit Stable                Ben Moreland IV, MD  08/26/24 2148

## 2024-08-27 NOTE — DISCHARGE SUMMARY
[facility]  Pulmonology/Critical Care  Discharge Summary      Patient Name: Rahel Bagley  MRN: [id number]  Admission Date: 8/26/2024  Hospital Length of Stay: 1 days  Discharge Date and Time: Patient death pronounced at 1401 by Dr. Davon Zaman    Reason for admission: <principal problem not specified>    Primary (Principal), Secondary, Final Diagnoses:  1. Unresponsive    2. Endotracheally intubated    3. Tachycardia    4. Sepsis, due to unspecified organism, unspecified whether acute organ dysfunction present    5. Hypoglycemia    6. PVC (premature ventricular contraction)    7. CHF (congestive heart failure)    8. NSTEMI (non-ST elevated myocardial infarction)      Procedures performed: * No surgery found *    Care, treatment & services provided:    Orders Placed This Encounter   Procedures    INTUBATION    Critical Care    Blood culture #1 **CANNOT BE ORDERED STAT**    Blood culture #2 **CANNOT BE ORDERED STAT**    Respiratory Culture    X-Ray Chest 1 View    CT Head Without Contrast    CBC auto differential    Comprehensive metabolic panel    COVID/RSV/FLU A&B PCR    Magnesium    Lactic acid, plasma    Protime-INR    Troponin I    Urinalysis, Reflex to Urine Culture    TSH    CBC with Differential    MRSA PCR    Lactic Acid, Plasma    RT Blood Gas    Protime-INR    BNP    T4, Free    Lactic Acid, Plasma    Comprehensive Metabolic Panel    Magnesium    Phosphorus    Troponin I    CBC with Differential    RT Blood Gas    Protime-INR    Lactic Acid, Plasma    Nelson to Gravity    IP Order set\panel used to initiate sepsis orders    Cardiac Monitoring - Adult    Assess for signs of distress Moderate to severe use of accessory muscles, respiratory rate exceeding 25 breaths per minute, gasping, coughing, choking, increased agitation, heart rate or blood pressure greater than 20% above level prior to withdra...    DNR (Do Not Resuscitate)    IP Wound Care consult Nurse    Inpatient consult to  Cardiology    Extubation with parameters Once    Cardiac monitoring strips    EKG 12-lead    EKG 12-lead    Type & Screen    Type & Screen    ABORH RETYPE    Insert arterial line    Admit to Inpatient    Prepare Fresh Frozen Plasma 2 Units    Prepare Fresh Frozen Plasma 2 Units     Death note: Came to bedside to evaluate patient.  On exam patient has no pupillary response, oculocephalic reflex is not present.  No response to pain in all 4 extremities.  No heart sounds on auscultation.  No peripheral pulses felt.    Cause of Death: Septic shock    Discharged Condition:     Disposition:  in medical facility      Davon Zaman MD

## 2024-08-27 NOTE — H&P
[facility] - [facility] ICU  Pulmonary Critical Care Note    Patient Name: Rahel Bagley  MRN: [id number]  Admission Date: 8/26/2024  Hospital Length of Stay: 0 days  Code Status: Prior  Attending Provider: Ronnell Rondon MD  Primary Care Provider: Marla Taveras MD     Subjective:     HPI:   74 year old female with a pmhx of CKD, DM, diverticulitis, HTN, MI, HLD, AND paroxysmal A-fib presents to the ED via EMS from her home after becoming unresponsive. EMS reports that the patient is on hospice, and after speaking with the patient's power of , they reported to EMS that the patient's DNR status was being revoked and changed to full code today. They report that the patient's family states that she began declining recently. The patient's family reports that she was not eating today and became unresponsive, so they called EMS. EMS reports that she would moan in response to a sternal rub. Upon arrival, the patients blood sugar was <20. The history and review of symptoms of the patient are limited due to the patient's condition.    HPI from ED note, upon my evaluation, the patient was intubated and sedated, tried calling the daughter, but the phone on file isn't in service, patient baseline unknown.    Hospital Course/Significant events:  8/26/24 intubation and admission to ICU    24 Hour Interval History:  NA    Past Medical History:   Diagnosis Date    Chronic kidney disease, unspecified     Pt states stage 4    Diabetes     Diverticulitis     Essential (primary) hypertension     Heart attack     Hypercholesteremia     Hypothyroidism     Mixed hyperlipidemia     Nonrheumatic mitral (valve) insufficiency     Obesity, unspecified     Paroxysmal atrial fibrillation     Unspecified osteoarthritis, unspecified site        Past Surgical History:   Procedure Laterality Date    APPENDECTOMY      CORONARY ARTERY BYPASS GRAFT      DEBRIDEMENT Right 12/21/2021    Muscle and/or fascia    HYSTERECTOMY       INSERTION OF PACEMAKER      KNEE SURGERY      LEFT HEART CATHETERIZATION Left 6/28/2023    Procedure: Left heart cath;  Surgeon: Khris Oconnor MD;  Location: [facility] CATH LAB;  Service: Cardiology;  Laterality: Left;    MITRAL VALVE SURGERY      SHOULDER SURGERY      Stent in heart       STENT, DRUG ELUTING, SINGLE VESSEL, CORONARY  6/28/2023    Procedure: Stent, Drug Eluting, Single Vessel, Coronary;  Surgeon: Khris Oconnor MD;  Location: [facility] CATH LAB;  Service: Cardiology;;    TONSILLECTOMY         Social History     Socioeconomic History    Marital status:    Tobacco Use    Smoking status: Never    Smokeless tobacco: Never   Substance and Sexual Activity    Alcohol use: Not Currently    Drug use: Never     Social Determinants of Health     Financial Resource Strain: Patient Declined (11/6/2023)    Received from [facility] and Its Subsidiaries and Affiliates, [facility] and Its Subsidiaries and Affiliates    Overall Financial Resource Strain (CARDIA)     Difficulty of Paying Living Expenses: Patient declined   Food Insecurity: No Food Insecurity (8/15/2024)    Received from [employer] [facility] and Its Subsidiaries and Affiliates    Hunger Vital Sign     Worried About Running Out of Food in the Last Year: Never true     Ran Out of Food in the Last Year: Never true   Transportation Needs: No Transportation Needs (8/15/2024)    Received from [employer] [facility] and Its Subsidiaries and Affiliates    PRAPARE - Transportation     Lack of Transportation (Medical): No     Lack of Transportation (Non-Medical): No   Physical Activity: Insufficiently Active (6/26/2023)    Exercise Vital Sign     Days of Exercise per Week: 3 days     Minutes of Exercise per Session: 30 min   Stress: No Stress Concern Present (6/26/2023)    [place] [employer] - Occupational  Stress Questionnaire     Feeling of Stress : Not at all   Housing Stability: Low Risk  (8/15/2024)    Received from [facility] and Its Subsidiaries and Affiliates    Housing Stability Vital Sign     Unable to Pay for Housing in the Last Year: No     Number of Times Moved in the Last Year: 1     Homeless in the Last Year: No           Objective:     Current Outpatient Medications   Medication Instructions    carvediloL (COREG) 6.25 MG tablet No dose, route, or frequency recorded.    FARXIGA 10 mg, Oral, Daily    fenofibrate micronized (LOFIBRA) 200 mg, Oral    furosemide (LASIX) 40 mg, Oral, Every morning    gabapentin (NEURONTIN) 300 mg, Oral, 2 times daily    hydrALAZINE (APRESOLINE) 25 mg, Oral, 2 times daily    JANUVIA 25 mg, Oral, Daily    levothyroxine (TIROSINT) 88 mcg Cap 1 capsule, Oral, Daily    metOLazone (ZAROXOLYN) 2.5 mg, Oral, 1 tablet three times a week (Monday, Wednesday, and Friday)    REPATHA SURECLICK 140 mg/mL PnIj No dose, route, or frequency recorded.    torsemide (DEMADEX) 40 mg, Oral       Current Inpatient Medications   amiodarone in dextrose        lactated ringers  1,000 mL Intravenous ED 1 Time    [START ON 8/28/2024] mupirocin   Nasal BID    [START ON 8/27/2024] piperacillin-tazobactam (Zosyn) IV (PEDS and ADULTS) (extended infusion is not appropriate)  4.5 g Intravenous Q8H    propofoL        vancomycin (VANCOCIN) IV (PEDS and ADULTS)  20 mg/kg Intravenous Once           Intake/Output Summary (Last 24 hours) at 8/26/2024 2240  Last data filed at 8/26/2024 2036  Gross per 24 hour   Intake 1970.58 ml   Output --   Net 1970.58 ml       Review of Systems   Unable to perform ROS: Intubated        Vital Signs (Most Recent):  Temp: (!) 101.1 °F (38.4 °C) (08/26/24 1901)  Pulse: (!) 113 (08/26/24 2142)  Resp: 20 (08/26/24 2142)  BP: (!) 114/93 (08/26/24 2102)  SpO2: 97 % (08/26/24 2142)  Body mass index is 25.61 kg/m².  Weight: 63.5 kg (140 lb) Vital Signs  (24h Range):  Temp:  [101.1 °F (38.4 °C)] 101.1 °F (38.4 °C)  Pulse:  [113-145] 113  Resp:  [20-47] 20  SpO2:  [86 %-99 %] 97 %  BP: ()/(56-93) 114/93     Physical Exam  Constitutional:       Appearance: She is ill-appearing.      Interventions: She is sedated and intubated.   Eyes:      Pupils: Pupils are equal, round, and reactive to light.   Cardiovascular:      Rate and Rhythm: Tachycardia present.      Heart sounds: Normal heart sounds.   Pulmonary:      Effort: She is intubated.      Breath sounds: Wheezing and rhonchi present.   Abdominal:      General: Bowel sounds are normal.      Palpations: Abdomen is soft.   Skin:     General: Skin is warm and dry.      Findings: Bruising present.           Mechanical ventilation support:  Vent Mode: A/C (08/26/24 2027)  Ventilator Initiated: Yes (08/26/24 1856)  Set Rate: 20 BPM (08/26/24 2027)  Vt Set: 450 mL (08/26/24 2027)  PEEP/CPAP: 5 cmH20 (08/26/24 2027)  Oxygen Concentration (%): 50 (08/26/24 2027)  Peak Airway Pressure: 30 cmH20 (08/26/24 2027)  Total Ve: 8.8 L/m (08/26/24 2027)  F/VT Ratio<105 (RSBI): (!) 43.29 (08/26/24 1856)    Lines/Drains/Airways       Drain  Duration                  Urethral Catheter -- days         NG/OG Tube 08/26/24 1853 [place] sump 18 Fr. Center mouth <1 day              Airway  Duration                  Airway - Non-Surgical 08/26/24 1852 Endotracheal Tube <1 day              Peripheral Intravenous Line  Duration                  Peripheral IV - Single Lumen 08/26/24 1836 20 G Anterior;Right Shoulder <1 day         Peripheral IV - Single Lumen 08/26/24 1844 20 G Left Antecubital <1 day         Peripheral IV - Single Lumen 08/26/24 1844 22 G Posterior;Right Hand <1 day         Peripheral IV - Single Lumen 08/26/24 1858 20 G Anterior;Left Shoulder <1 day                    Significant Labs:    Lab Results   Component Value Date    WBC 7.16 08/26/2024    HGB 13.9 08/26/2024    HCT 44.5 08/26/2024    MCV 96.3 (H) 08/26/2024    PLT  118 (L) 08/26/2024         BMP  Lab Results   Component Value Date     08/26/2024    K 5.5 (H) 08/26/2024    CL 90 (L) 08/26/2024    CO2 22 (L) 08/26/2024    BUN 72.4 (H) 08/26/2024    CREATININE 2.17 (H) 08/26/2024    CALCIUM 10.5 (H) 08/26/2024    ANIONGAP 6 (L) 08/23/2024    ESTGFRAFRICA 27 08/23/2024    EGFRNONAA 25 08/04/2022       ABG  Recent Labs   Lab 08/26/24  2142   PH 7.440   PO2 73.0*   PCO2 49.0*   HCO3 33.3*           Significant Imaging:  I have reviewed all pertinent imaging within the past 24 hours.        Assessment/Plan:     Assessment  Altered mental status, Ddx: hypoglycemia, hypercapnia, uremia, sepsis  Hypoglycemia resolved without change in mental status, T2DM  Severe sepsis, source unknown  VINCENT vs CKD; BUN/Cr 72.4/2.17 (this might be her new baseline), Hyperkalemia, HAGMA due to uremia  Increased INR >15  Coffee ground fluid on OG  Lactic acidosis  Elevated troponin  Transaminitis  Hypothyroidsm (elevated TSH, pending FT4)  Hx of HFrEF with EF 25% in July      Plan  Admit to ICU  Wean ventilatur as per ARDS net protocol  Received fluid resuscitation per sepsis protocol, start  cc/hr of 10 hrs  If BP drops despite fluids, start levophed for MAP>65  Start vanc and zosyn, deescalate per cultures  BUN hasn't changed significantly recently, patient is overloaded and would benefit from dialysis, will consult nephrology  Repeating INR, if confirmed > 15, will treat as DIC and transfuse 4 FFPs  Start protonix 80 once then 40 BID for suspected GI bleed, consider GI consult if H&H drops  Trend troponin with EKG q6  Continue to monitor liver function  Hypothyroidism is very mild to cause AMS  Repeating ECHO and getting BNP  Low dose SSI    DVT Prophylaxis: none  GI Prophylaxis: Protonix          Vanessa Mendez MD  Pulmonary Critical Care Medicine  [facility] - [facility] ICU

## 2024-08-27 NOTE — NURSING
Discussed status and plan of care with assigned nurse Fred RN, regarding  consult regarding sacrum. Assigned nurse Cochran RN, reports it is not appropriate to see patient at this time. She is currently resting on a low air loss bed with pressure injury prevention measures in place.

## 2024-08-29 LAB
BACTERIA SPEC CULT: ABNORMAL
GRAM STN SPEC: ABNORMAL

## 2024-08-31 LAB
BACTERIA BLD CULT: NORMAL
BACTERIA BLD CULT: NORMAL

## 2025-01-03 NOTE — PATIENT INSTRUCTIONS
Cleanse with NS or soap and water   Apply endoform to open areas ,few drops of saline to moisten   Cover with telfa, coban   Change twice weekly   
Right arm;

## (undated) DEVICE — CANNULA ADULT NASAL 7FT

## (undated) DEVICE — GUIDEWIRE SION BLUE STR 190CM

## (undated) DEVICE — SET ANGIO ACIST CVI ANGIOTOUCH

## (undated) DEVICE — PAD DEFIB RADIOLUCENT ADULT

## (undated) DEVICE — Device

## (undated) DEVICE — CATH MULTIPACK EXPO 6FR

## (undated) DEVICE — CATH EAGLE EYE PLATINUM

## (undated) DEVICE — SHEATH INTRODUCER 6FR 11CM

## (undated) DEVICE — VALVE CONTROL COPILOT

## (undated) DEVICE — GUIDE LAUNCHER 6FR EBU 3.5

## (undated) DEVICE — CATH NC EMERGE MR 3.5X12MM

## (undated) DEVICE — KIT MINI STK MAX COAX 5FR 10CM

## (undated) DEVICE — CATH NC EMERGE MR 2.5X12MM

## (undated) DEVICE — GUIDEWIRE INQWIRE SE 3MM JTIP

## (undated) DEVICE — SHEATH INTRODUCER 5FR 10CM